# Patient Record
Sex: FEMALE | Race: WHITE | NOT HISPANIC OR LATINO | Employment: FULL TIME | URBAN - METROPOLITAN AREA
[De-identification: names, ages, dates, MRNs, and addresses within clinical notes are randomized per-mention and may not be internally consistent; named-entity substitution may affect disease eponyms.]

---

## 2017-03-30 DIAGNOSIS — Z00.00 ANNUAL PHYSICAL EXAM: Primary | ICD-10-CM

## 2017-05-11 ENCOUNTER — HOSPITAL ENCOUNTER (OUTPATIENT)
Dept: RADIOLOGY | Facility: HOSPITAL | Age: 52
Discharge: HOME | End: 2017-05-11
Attending: INTERNAL MEDICINE
Payer: COMMERCIAL

## 2017-05-11 ENCOUNTER — CLINICAL SUPPORT (OUTPATIENT)
Dept: INTERNAL MEDICINE | Facility: CLINIC | Age: 52
End: 2017-05-11
Payer: COMMERCIAL

## 2017-05-11 ENCOUNTER — OFFICE VISIT (OUTPATIENT)
Dept: FAMILY MEDICINE | Facility: CLINIC | Age: 52
End: 2017-05-11
Payer: COMMERCIAL

## 2017-05-11 VITALS
TEMPERATURE: 98 F | WEIGHT: 178 LBS | HEIGHT: 66 IN | HEART RATE: 70 BPM | DIASTOLIC BLOOD PRESSURE: 80 MMHG | SYSTOLIC BLOOD PRESSURE: 128 MMHG | RESPIRATION RATE: 18 BRPM | BODY MASS INDEX: 28.61 KG/M2 | OXYGEN SATURATION: 98 %

## 2017-05-11 DIAGNOSIS — I10 ESSENTIAL HYPERTENSION: ICD-10-CM

## 2017-05-11 DIAGNOSIS — Z12.31 VISIT FOR SCREENING MAMMOGRAM: ICD-10-CM

## 2017-05-11 DIAGNOSIS — Z00.00 ROUTINE PHYSICAL EXAMINATION: Primary | ICD-10-CM

## 2017-05-11 DIAGNOSIS — Z00.00 ANNUAL PHYSICAL EXAM: ICD-10-CM

## 2017-05-11 DIAGNOSIS — Z00.00 ANNUAL PHYSICAL EXAM: Primary | ICD-10-CM

## 2017-05-11 LAB
ALBUMIN SERPL BCP-MCNC: 4 G/DL
ALP SERPL-CCNC: 50 U/L
ALT SERPL W/O P-5'-P-CCNC: 12 U/L
ANION GAP SERPL CALC-SCNC: 12 MMOL/L
AST SERPL-CCNC: 15 U/L
BILIRUB SERPL-MCNC: 0.9 MG/DL
BUN SERPL-MCNC: 11 MG/DL
CALCIUM SERPL-MCNC: 9 MG/DL
CHLORIDE SERPL-SCNC: 100 MMOL/L
CHOLEST/HDLC SERPL: 3.1 {RATIO}
CO2 SERPL-SCNC: 28 MMOL/L
CREAT SERPL-MCNC: 0.8 MG/DL
ERYTHROCYTE [DISTWIDTH] IN BLOOD BY AUTOMATED COUNT: 14.9 %
EST. GFR  (AFRICAN AMERICAN): >60 ML/MIN/1.73 M^2
EST. GFR  (NON AFRICAN AMERICAN): >60 ML/MIN/1.73 M^2
GLUCOSE SERPL-MCNC: 94 MG/DL
HCT VFR BLD AUTO: 43.7 %
HDL/CHOLESTEROL RATIO: 32 %
HDLC SERPL-MCNC: 203 MG/DL
HDLC SERPL-MCNC: 65 MG/DL
HGB BLD-MCNC: 15 G/DL
LDLC SERPL CALC-MCNC: 115 MG/DL
MCH RBC QN AUTO: 27.8 PG
MCHC RBC AUTO-ENTMCNC: 34.3 %
MCV RBC AUTO: 81 FL
NONHDLC SERPL-MCNC: 138 MG/DL
PLATELET # BLD AUTO: 253 K/UL
PMV BLD AUTO: 10.5 FL
POTASSIUM SERPL-SCNC: 3.9 MMOL/L
PROT SERPL-MCNC: 7.3 G/DL
RBC # BLD AUTO: 5.4 M/UL
SODIUM SERPL-SCNC: 140 MMOL/L
TRIGL SERPL-MCNC: 115 MG/DL
TSH SERPL DL<=0.005 MIU/L-ACNC: 1.6 UIU/ML
WBC # BLD AUTO: 5.03 K/UL

## 2017-05-11 PROCEDURE — 80053 COMPREHEN METABOLIC PANEL: CPT | Mod: PO

## 2017-05-11 PROCEDURE — 85027 COMPLETE CBC AUTOMATED: CPT | Mod: PO

## 2017-05-11 PROCEDURE — 84443 ASSAY THYROID STIM HORMONE: CPT

## 2017-05-11 PROCEDURE — 77067 SCR MAMMO BI INCL CAD: CPT | Mod: TC

## 2017-05-11 PROCEDURE — 83036 HEMOGLOBIN GLYCOSYLATED A1C: CPT

## 2017-05-11 PROCEDURE — 77063 BREAST TOMOSYNTHESIS BI: CPT | Mod: 26,,, | Performed by: RADIOLOGY

## 2017-05-11 PROCEDURE — 99999 PR PBB SHADOW E&M-EST. PATIENT-LVL III: CPT | Mod: PBBFAC,,, | Performed by: INTERNAL MEDICINE

## 2017-05-11 PROCEDURE — 77067 SCR MAMMO BI INCL CAD: CPT | Mod: 26,,, | Performed by: RADIOLOGY

## 2017-05-11 PROCEDURE — 99396 PREV VISIT EST AGE 40-64: CPT | Mod: S$GLB,,, | Performed by: INTERNAL MEDICINE

## 2017-05-11 PROCEDURE — 80061 LIPID PANEL: CPT

## 2017-05-11 PROCEDURE — 3074F SYST BP LT 130 MM HG: CPT | Mod: S$GLB,,, | Performed by: INTERNAL MEDICINE

## 2017-05-11 PROCEDURE — 3079F DIAST BP 80-89 MM HG: CPT | Mod: S$GLB,,, | Performed by: INTERNAL MEDICINE

## 2017-05-11 NOTE — PROGRESS NOTES
May 11, 2017                                                                                                                                                                                                                                                                                      Eugenie Najera  [address] [facility] [address]                                                                                                                                                                                                                                                                                                RE: Eugenie Najera                                                        Clinic #:[id number]                                                                                                                                   Dear  Eugenie Najera,                                                                                                                                           Thank you for allowing me to serve you and perform your Executive Health exam on May 11, 2017.   This letter will serve a brief summary of the history, physical findings, and laboratory/studies performed and recommendations at that time.                                                                                         REASON FOR VISIT: Executive Health Preventive Physical Examination    Past Medical History:   Diagnosis Date    Anemia     Hypertension     Thyroid disorder        Past Surgical History:   Procedure Laterality Date     SECTION      X2    DILATION AND CURETTAGE OF UTERUS      lapband      TUBAL LIGATION         Family History   Problem Relation Age of Onset    Cancer Father      thyroid cancer       Social History     Social History    Marital status:      Spouse name: N/A    Number of children: N/A    Years of education: N/A     Occupational History    Not on  "file.     Social History Main Topics    Smoking status: Never Smoker    Smokeless tobacco: Never Used    Alcohol use 3.0 oz/week     5 Glasses of wine per week      Comment: wine, beer    Drug use: No    Sexual activity: Not on file     Other Topics Concern    Not on file     Social History Narrative       Allergies: Penicillin v potassium    Current Outpatient Prescriptions   Medication Sig Dispense Refill    acetaminophen (TYLENOL EXTRA STRENGTH) 500 MG tablet Take by mouth. 3 every 3-4 hours      hydrochlorothiazide (HYDRODIURIL) 25 MG tablet Take 1 tablet (25 mg total) by mouth once daily. 90 tablet 1     No current facility-administered medications for this visit.        REVIEW OF SYSTEMS:  No recent changes in weight, or complaints of fatigue. No recent changes in vision, or hearing. Denies frequent headaches.No recent changes in voice. No new or changing skin lesions. Denies abnormal bruising or bleeding.  Denies chest pain or sensation of skipped beats. No new onset of shortness of breath, or dyspnea on exertion. Denies abdominal discomfort, constipation, diarrhea,or blood in stool. Denies difficulty with urination.   No recent joint swelling or muscle discomfort. Denies pain or weakness in extremities. No recent loss of balance. Denies problems with sleep or depression.        Remainder of the review of systems without pertinent positives at this time.                                                                              PHYSICAL EXAM:   VITAL SIGNS: /80 (BP Location: Right arm, Patient Position: Sitting, BP Method: Manual)  Pulse 70  Temp 97.9 °F (36.6 °C) (Oral)   Resp 18  Ht 5' 6" (1.676 m)  Wt 80.7 kg (178 lb)  LMP 08/09/2015  SpO2 98%  BMI 28.73 kg/m2  GENERAL APPEARANCE:  Well nourished and normally developed,  pleasant 51 y.o. female, in good spirits.  SKIN: Without rashes or overt lesions.  HEENT: Head normacephalic. There was no scleral icterus. Mucous membranes were " moist. Dentition. Neck is supple, no thyromegaly, or carotid bruits.  LUNGS: Clear to auscultation bilaterally. Normal respiratory effort.  HEART: Exam reveals regular rate and rhythm. First and second heart sounds normal. No murmurs, rubs or gallops.   ABDOMEN: Soft, non-tender, non-distended. Exam reveals normal bowl sounds, no masses, no organomegaly and no aortic enlargement.    EXTREMITIES:  Non edematous, both femoral and pedal pulses are normal. No joint stiffness or tenderness. Full range of motion and strength, upper and lower bilaterally.    LAB DATA/STUDIES REVIEWED:  LABS:  Acceptable  Mammogram:  Normal    ASSESSMENT/RECOMMENDATIONS :    At this time,  you appear to be in good medical condition.    Your blood pressure is controlled.  You are not anemic and your liver enzymes are good.   Continue to work on regular exercise, maintenance of a healthy weight, balanced diet rich in fruits/vegetables and lean protein, and avoidance of unhealthy habits like smoking and excessive alcohol intake.  I look forward to seeing you again next year.    Please contact me should you have any questions or concerns regarding physical findings, or my recommendations.       Sincerely yours,          Ramon Mcleod M.D.  Department of Internal Medicine  [facility]

## 2017-05-12 LAB
ESTIMATED AVG GLUCOSE: 105 MG/DL
HBA1C MFR BLD HPLC: 5.3 %

## 2017-08-25 ENCOUNTER — OFFICE VISIT (OUTPATIENT)
Dept: URGENT CARE | Facility: CLINIC | Age: 52
End: 2017-08-25
Payer: COMMERCIAL

## 2017-08-25 VITALS
SYSTOLIC BLOOD PRESSURE: 154 MMHG | HEIGHT: 66 IN | OXYGEN SATURATION: 96 % | RESPIRATION RATE: 18 BRPM | BODY MASS INDEX: 27.32 KG/M2 | DIASTOLIC BLOOD PRESSURE: 92 MMHG | TEMPERATURE: 97 F | WEIGHT: 170 LBS | HEART RATE: 100 BPM

## 2017-08-25 DIAGNOSIS — S81.812A LACERATION OF LEFT LEG, INITIAL ENCOUNTER: Primary | ICD-10-CM

## 2017-08-25 PROCEDURE — 3008F BODY MASS INDEX DOCD: CPT | Mod: S$GLB,,, | Performed by: FAMILY MEDICINE

## 2017-08-25 PROCEDURE — 99214 OFFICE O/P EST MOD 30 MIN: CPT | Mod: S$GLB,,, | Performed by: FAMILY MEDICINE

## 2017-08-25 PROCEDURE — 3077F SYST BP >= 140 MM HG: CPT | Mod: S$GLB,,, | Performed by: FAMILY MEDICINE

## 2017-08-25 PROCEDURE — 3080F DIAST BP >= 90 MM HG: CPT | Mod: S$GLB,,, | Performed by: FAMILY MEDICINE

## 2017-08-25 RX ORDER — PHENTERMINE HYDROCHLORIDE 37.5 MG/1
TABLET ORAL
Refills: 0 | COMMUNITY
Start: 2017-06-26 | End: 2018-05-22 | Stop reason: ALTCHOICE
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
## 2017-08-28 ENCOUNTER — TELEPHONE (OUTPATIENT)
Dept: URGENT CARE | Facility: CLINIC | Age: 52
End: 2017-08-28

## 2018-05-22 ENCOUNTER — OFFICE VISIT (OUTPATIENT)
Dept: FAMILY MEDICINE | Facility: CLINIC | Age: 53
End: 2018-05-22
Payer: COMMERCIAL

## 2018-05-22 ENCOUNTER — CLINICAL SUPPORT (OUTPATIENT)
Dept: INTERNAL MEDICINE | Facility: CLINIC | Age: 53
End: 2018-05-22
Payer: COMMERCIAL

## 2018-05-22 VITALS
SYSTOLIC BLOOD PRESSURE: 132 MMHG | WEIGHT: 190 LBS | HEIGHT: 66 IN | BODY MASS INDEX: 30.53 KG/M2 | RESPIRATION RATE: 18 BRPM | DIASTOLIC BLOOD PRESSURE: 78 MMHG | TEMPERATURE: 99 F | HEART RATE: 76 BPM

## 2018-05-22 DIAGNOSIS — J06.9 UPPER RESPIRATORY TRACT INFECTION, UNSPECIFIED TYPE: ICD-10-CM

## 2018-05-22 DIAGNOSIS — Z00.00 ANNUAL PHYSICAL EXAM: Primary | ICD-10-CM

## 2018-05-22 DIAGNOSIS — Z00.00 ROUTINE PHYSICAL EXAMINATION: Primary | ICD-10-CM

## 2018-05-22 DIAGNOSIS — E66.9 OBESITY (BMI 30-39.9): ICD-10-CM

## 2018-05-22 DIAGNOSIS — Z12.11 SCREEN FOR COLON CANCER: ICD-10-CM

## 2018-05-22 DIAGNOSIS — D72.829 LEUKOCYTOSIS, UNSPECIFIED TYPE: ICD-10-CM

## 2018-05-22 LAB
ALBUMIN SERPL BCP-MCNC: 4.1 G/DL
ALP SERPL-CCNC: 53 U/L
ALT SERPL W/O P-5'-P-CCNC: 15 U/L
ANION GAP SERPL CALC-SCNC: 10 MMOL/L
AST SERPL-CCNC: 15 U/L
BILIRUB SERPL-MCNC: 1 MG/DL
BUN SERPL-MCNC: 9 MG/DL
CALCIUM SERPL-MCNC: 9.6 MG/DL
CHLORIDE SERPL-SCNC: 100 MMOL/L
CHOLEST SERPL-MCNC: 217 MG/DL
CHOLEST/HDLC SERPL: 3.8 {RATIO}
CO2 SERPL-SCNC: 30 MMOL/L
CREAT SERPL-MCNC: 0.7 MG/DL
ERYTHROCYTE [DISTWIDTH] IN BLOOD BY AUTOMATED COUNT: 13.7 %
EST. GFR  (AFRICAN AMERICAN): >60 ML/MIN/1.73 M^2
EST. GFR  (NON AFRICAN AMERICAN): >60 ML/MIN/1.73 M^2
ESTIMATED AVG GLUCOSE: 97 MG/DL
GLUCOSE SERPL-MCNC: 96 MG/DL
HBA1C MFR BLD HPLC: 5 %
HCT VFR BLD AUTO: 44.4 %
HDLC SERPL-MCNC: 57 MG/DL
HDLC SERPL: 26.3 %
HGB BLD-MCNC: 15.4 G/DL
LDLC SERPL CALC-MCNC: 137 MG/DL
MCH RBC QN AUTO: 30.4 PG
MCHC RBC AUTO-ENTMCNC: 34.7 G/DL
MCV RBC AUTO: 88 FL
NONHDLC SERPL-MCNC: 160 MG/DL
PLATELET # BLD AUTO: 273 K/UL
PMV BLD AUTO: 9.7 FL
POTASSIUM SERPL-SCNC: 4.5 MMOL/L
PROT SERPL-MCNC: 7.3 G/DL
RBC # BLD AUTO: 5.06 M/UL
SODIUM SERPL-SCNC: 140 MMOL/L
TRIGL SERPL-MCNC: 115 MG/DL
TSH SERPL DL<=0.005 MIU/L-ACNC: 1.39 UIU/ML
WBC # BLD AUTO: 15.47 K/UL

## 2018-05-22 PROCEDURE — 85027 COMPLETE CBC AUTOMATED: CPT | Mod: PO

## 2018-05-22 PROCEDURE — 83036 HEMOGLOBIN GLYCOSYLATED A1C: CPT

## 2018-05-22 PROCEDURE — 99396 PREV VISIT EST AGE 40-64: CPT | Mod: S$GLB,,, | Performed by: INTERNAL MEDICINE

## 2018-05-22 PROCEDURE — 80061 LIPID PANEL: CPT

## 2018-05-22 PROCEDURE — 99999 PR PBB SHADOW E&M-EST. PATIENT-LVL III: CPT | Mod: PBBFAC,,, | Performed by: INTERNAL MEDICINE

## 2018-05-22 PROCEDURE — 3078F DIAST BP <80 MM HG: CPT | Mod: CPTII,S$GLB,, | Performed by: INTERNAL MEDICINE

## 2018-05-22 PROCEDURE — 80053 COMPREHEN METABOLIC PANEL: CPT | Mod: PO

## 2018-05-22 PROCEDURE — 36415 COLL VENOUS BLD VENIPUNCTURE: CPT | Mod: PO

## 2018-05-22 PROCEDURE — 84443 ASSAY THYROID STIM HORMONE: CPT

## 2018-05-22 PROCEDURE — 3075F SYST BP GE 130 - 139MM HG: CPT | Mod: CPTII,S$GLB,, | Performed by: INTERNAL MEDICINE

## 2018-05-22 RX ORDER — LEVOFLOXACIN 750 MG/1
750 TABLET ORAL DAILY
Qty: 7 TABLET | Refills: 0 | Status: SHIPPED | OUTPATIENT
Start: 2018-05-22 | End: 2018-05-29

## 2018-05-22 RX ORDER — PHENTERMINE HYDROCHLORIDE 37.5 MG/1
TABLET ORAL
Qty: 30 TABLET | Refills: 0 | Status: SHIPPED | OUTPATIENT
Start: 2018-05-22 | End: 2019-03-19

## 2018-05-22 RX ORDER — BENZONATATE 100 MG/1
CAPSULE ORAL
Qty: 45 CAPSULE | Refills: 0 | Status: SHIPPED | OUTPATIENT
Start: 2018-05-22 | End: 2019-03-19

## 2018-05-22 NOTE — PROGRESS NOTES
May 22, 2018                                                                                                                                                                                                                                                                                      Eugenie Najera  [address] [place] [id number]                                                                                                                                                                                                                                                                                                RE: Eugenie Najera                                                        Clinic #:[id number]                                                                                                                                   Dear  Eugenie Najera,                                                                                                                                           Thank you for allowing me to serve you and perform your Executive Health exam on May 22, 2018.   This letter will serve a brief summary of the history, physical findings, and laboratory/studies performed and recommendations at that time.                                                                                         REASON FOR VISIT: Executive Health Preventive Physical Examination    Past Medical History:   Diagnosis Date    Anemia     Hypertension     Thyroid disorder        Past Surgical History:   Procedure Laterality Date     SECTION      X2    DILATION AND CURETTAGE OF UTERUS      lapband      TUBAL LIGATION         Family History   Problem Relation Age of Onset    Cancer Father         thyroid cancer       Social History     Social History    Marital status:      Spouse name: N/A    Number of children: N/A    Years of education: N/A     Occupational History    Not on file.  "    Social History Main Topics    Smoking status: Never Smoker    Smokeless tobacco: Never Used    Alcohol use 3.0 oz/week     5 Glasses of wine per week      Comment: wine, beer    Drug use: No    Sexual activity: Not on file     Other Topics Concern    Not on file     Social History Narrative    No narrative on file       Allergies: Penicillin v potassium    Current Outpatient Prescriptions   Medication Sig Dispense Refill    acetaminophen (TYLENOL EXTRA STRENGTH) 500 MG tablet Take 1,000 mg by mouth daily as needed. As directed on package      hydroCHLOROthiazide (HYDRODIURIL) 25 MG tablet Take 1 tablet (25 mg total) by mouth once daily. 90 tablet 1    benzonatate (TESSALON) 100 MG capsule 1 - 2 po every 6 hours prn cough 45 capsule 0    levoFLOXacin (LEVAQUIN) 750 MG tablet Take 1 tablet (750 mg total) by mouth once daily. 7 tablet 0    phentermine (ADIPEX-P) 37.5 mg tablet TK 1 T PO QAM 30 tablet 0     No current facility-administered medications for this visit.        REVIEW OF SYSTEMS:  No recent changes in weight, or complaints of fatigue. No recent changes in vision, or hearing. Denies frequent headaches.No recent changes in voice. No new or changing skin lesions. Denies abnormal bruising or bleeding.  Denies chest pain or sensation of skipped beats. No new onset of shortness of breath, or dyspnea on exertion. Denies abdominal discomfort, constipation, diarrhea,or blood in stool. Denies difficulty with urination.   No recent joint swelling or muscle discomfort. Denies pain or weakness in extremities. No recent loss of balance. Denies problems with sleep or depression.        Remainder of the review of systems without pertinent positives at this time.                                                                              PHYSICAL EXAM:   VITAL SIGNS: /78   Pulse 76   Temp 98.5 °F (36.9 °C) (Oral)   Resp 18   Ht 5' 6" (1.676 m)   Wt 86.2 kg (190 lb)   LMP 08/09/2015   BMI 30.67 " kg/m²   GENERAL APPEARANCE:  Well nourished and normally developed,  pleasant 52 y.o. female, in good spirits.  SKIN: Without rashes or overt lesions.  HEENT: Head normacephalic. There was no scleral icterus. Mucous membranes were moist. Dentition. Neck is supple, no thyromegaly, or carotid bruits.  LUNGS: Clear to auscultation bilaterally. Normal respiratory effort.  HEART: Exam reveals regular rate and rhythm. First and second heart sounds normal. No murmurs, rubs or gallops.   ABDOMEN: Soft, non-tender, non-distended. Exam reveals normal bowl sounds, no masses, no organomegaly and no aortic enlargement.    EXTREMITIES:  Non edematous, both femoral and pedal pulses are normal. No joint stiffness or tenderness. Full range of motion and strength, upper and lower bilaterally.    LAB DATA/STUDIES REVIEWED:  LABS:  High white blood cells.      ASSESSMENT/RECOMMENDATIONS :    At this time,  you appear to be in good medical condition.    We have prescribed Adipex for weight loss today.  Your elevated white blood cell count is likely related to your bronchitis.  We will treat your cough and repeat your blood count.  Continue to work on regular exercise, maintenance of a healthy weight, balanced diet rich in fruits/vegetables and lean protein, and avoidance of unhealthy habits like smoking and excessive alcohol intake.  I look forward to seeing you again next year.    Please contact me should you have any questions or concerns regarding physical findings, or my recommendations.       Sincerely yours,          Ramon Mcleod M.D.  Department of Internal Medicine  [facility]

## 2018-06-14 ENCOUNTER — PATIENT MESSAGE (OUTPATIENT)
Dept: GASTROENTEROLOGY | Facility: CLINIC | Age: 53
End: 2018-06-14

## 2018-06-18 ENCOUNTER — TELEPHONE (OUTPATIENT)
Dept: FAMILY MEDICINE | Facility: CLINIC | Age: 53
End: 2018-06-18

## 2018-06-18 NOTE — TELEPHONE ENCOUNTER
Placed call to pt to confirm appt. Left message to call clinic to reschedule or cancel if unable to come. Call back number provided.

## 2018-09-21 ENCOUNTER — OFFICE VISIT (OUTPATIENT)
Dept: URGENT CARE | Facility: CLINIC | Age: 53
End: 2018-09-21
Payer: COMMERCIAL

## 2018-09-21 VITALS
OXYGEN SATURATION: 97 % | WEIGHT: 190 LBS | BODY MASS INDEX: 30.53 KG/M2 | DIASTOLIC BLOOD PRESSURE: 101 MMHG | RESPIRATION RATE: 16 BRPM | HEIGHT: 66 IN | TEMPERATURE: 98 F | HEART RATE: 87 BPM | SYSTOLIC BLOOD PRESSURE: 152 MMHG

## 2018-09-21 DIAGNOSIS — H60.502 ACUTE OTITIS EXTERNA OF LEFT EAR, UNSPECIFIED TYPE: ICD-10-CM

## 2018-09-21 DIAGNOSIS — K29.60 REFLUX GASTRITIS: Primary | ICD-10-CM

## 2018-09-21 PROCEDURE — 3077F SYST BP >= 140 MM HG: CPT | Mod: CPTII,S$GLB,, | Performed by: FAMILY MEDICINE

## 2018-09-21 PROCEDURE — 3080F DIAST BP >= 90 MM HG: CPT | Mod: CPTII,S$GLB,, | Performed by: FAMILY MEDICINE

## 2018-09-21 PROCEDURE — 3008F BODY MASS INDEX DOCD: CPT | Mod: CPTII,S$GLB,, | Performed by: FAMILY MEDICINE

## 2018-09-21 PROCEDURE — 99214 OFFICE O/P EST MOD 30 MIN: CPT | Mod: S$GLB,,, | Performed by: FAMILY MEDICINE

## 2018-09-21 RX ORDER — FLUTICASONE PROPIONATE 50 MCG
1 SPRAY, SUSPENSION (ML) NASAL 2 TIMES DAILY
Qty: 1 BOTTLE | Refills: 2 | Status: SHIPPED | OUTPATIENT
Start: 2018-09-21 | End: 2020-08-13

## 2018-09-21 RX ORDER — OFLOXACIN 3 MG/ML
10 SOLUTION AURICULAR (OTIC) 2 TIMES DAILY
Qty: 10 ML | Refills: 0 | Status: SHIPPED | OUTPATIENT
Start: 2018-09-21 | End: 2018-10-05

## 2018-09-21 RX ORDER — SUCRALFATE 1 G/1
1 TABLET ORAL 4 TIMES DAILY
Qty: 120 TABLET | Refills: 1 | Status: SHIPPED | OUTPATIENT
Start: 2018-09-21 | End: 2018-12-11 | Stop reason: ALTCHOICE

## 2018-09-21 RX ORDER — LIDOCAINE HYDROCHLORIDE 20 MG/ML
SOLUTION OROPHARYNGEAL
Qty: 100 ML | Refills: 0 | Status: SHIPPED | OUTPATIENT
Start: 2018-09-21 | End: 2019-03-19

## 2018-09-21 NOTE — PROGRESS NOTES
"Subjective:       Patient ID: Eugenie Najera is a 52 y.o. female.    Vitals:  height is 5' 6" (1.676 m) and weight is 86.2 kg (190 lb). Her temperature is 97.9 °F (36.6 °C). Her blood pressure is 152/101 (abnormal) and her pulse is 87. Her respiration is 16 and oxygen saturation is 97%.     Chief Complaint: Gastroesophageal Reflux    Pt has been taking otc nexium for 3 weeks and no relief. Pt c/o left ear pain.       Gastroesophageal Reflux   She complains of heartburn and a hoarse voice. She reports no abdominal pain, no chest pain, no nausea or no sore throat. This is a new problem. The current episode started 1 to 4 weeks ago. The problem has been unchanged. The symptoms are aggravated by lying down.     Review of Systems   Constitution: Negative for chills and fever.   HENT: Positive for hoarse voice. Negative for sore throat.    Eyes: Negative for blurred vision.   Cardiovascular: Negative for chest pain.   Respiratory: Negative for shortness of breath.    Skin: Negative for rash.   Musculoskeletal: Negative for back pain and joint pain.   Gastrointestinal: Positive for heartburn. Negative for abdominal pain, diarrhea, nausea and vomiting.   Neurological: Negative for headaches.   Psychiatric/Behavioral: The patient is not nervous/anxious.        Objective:      Physical Exam   Constitutional: She is oriented to person, place, and time. She appears well-developed and well-nourished.   HENT:   Head: Normocephalic and atraumatic.   Right Ear: Hearing, tympanic membrane, external ear and ear canal normal.   Left Ear: There is swelling (mild and some erythema at inf aspect of canal). A middle ear effusion is present.   Nose: Nose normal.   Mouth/Throat: Mucous membranes are normal.   Eyes: Conjunctivae and lids are normal.   Neck: Trachea normal and full passive range of motion without pain. Neck supple.   Cardiovascular: Normal rate, regular rhythm and normal heart sounds.   Pulmonary/Chest: Effort normal and " breath sounds normal. No respiratory distress.   Abdominal: Soft. Normal appearance and bowel sounds are normal. She exhibits no distension, no abdominal bruit, no pulsatile midline mass and no mass. There is tenderness (epigastric).   Musculoskeletal: Normal range of motion. She exhibits no edema.   Neurological: She is alert and oriented to person, place, and time. She has normal strength.   Skin: Skin is warm, dry and intact. She is not diaphoretic. No pallor.   Psychiatric: She has a normal mood and affect. Her speech is normal and behavior is normal. Judgment and thought content normal. Cognition and memory are normal.   Nursing note and vitals reviewed.      Assessment:       1. Reflux gastritis    2. Acute otitis externa of left ear, unspecified type        Plan:         Reflux gastritis  -     sucralfate (CARAFATE) 1 gram tablet; Take 1 tablet (1 g total) by mouth 4 (four) times daily.  Dispense: 120 tablet; Refill: 1  -     lidocaine HCl 2% (LIDOCAINE VISCOUS) 2 % Soln; 5mL by mouth 3 times daily  Dispense: 100 mL; Refill: 0    Acute otitis externa of left ear, unspecified type  -     ofloxacin (FLOXIN) 0.3 % otic solution; Place 10 drops into the left ear 2 (two) times daily. for 14 days  Dispense: 10 mL; Refill: 0    Other orders  -     fluticasone (FLONASE) 50 mcg/actuation nasal spray; 1 spray (50 mcg total) by Each Nare route 2 (two) times daily.  Dispense: 1 Bottle; Refill: 2        advised diet control. Instructions for pre-flight precautions

## 2018-11-29 ENCOUNTER — OFFICE VISIT (OUTPATIENT)
Dept: FAMILY MEDICINE | Facility: CLINIC | Age: 53
End: 2018-11-29
Payer: COMMERCIAL

## 2018-11-29 ENCOUNTER — LAB VISIT (OUTPATIENT)
Dept: LAB | Facility: HOSPITAL | Age: 53
End: 2018-11-29
Attending: NURSE PRACTITIONER
Payer: COMMERCIAL

## 2018-11-29 VITALS
OXYGEN SATURATION: 97 % | DIASTOLIC BLOOD PRESSURE: 72 MMHG | BODY MASS INDEX: 32.56 KG/M2 | HEART RATE: 92 BPM | HEIGHT: 66 IN | WEIGHT: 202.63 LBS | TEMPERATURE: 98 F | SYSTOLIC BLOOD PRESSURE: 130 MMHG

## 2018-11-29 DIAGNOSIS — D72.9 NEUTROPHILIA: ICD-10-CM

## 2018-11-29 DIAGNOSIS — I10 ESSENTIAL (PRIMARY) HYPERTENSION: ICD-10-CM

## 2018-11-29 DIAGNOSIS — Z12.11 ENCOUNTER FOR SCREENING FOR MALIGNANT NEOPLASM OF COLON: ICD-10-CM

## 2018-11-29 DIAGNOSIS — K21.00 GASTROESOPHAGEAL REFLUX DISEASE WITH ESOPHAGITIS: Primary | ICD-10-CM

## 2018-11-29 DIAGNOSIS — E66.09 CLASS 1 OBESITY DUE TO EXCESS CALORIES WITH SERIOUS COMORBIDITY AND BODY MASS INDEX (BMI) OF 32.0 TO 32.9 IN ADULT: ICD-10-CM

## 2018-11-29 LAB
BASOPHILS # BLD AUTO: 0.05 K/UL
BASOPHILS NFR BLD: 0.5 %
DIFFERENTIAL METHOD: ABNORMAL
EOSINOPHIL # BLD AUTO: 0.1 K/UL
EOSINOPHIL NFR BLD: 0.7 %
ERYTHROCYTE [DISTWIDTH] IN BLOOD BY AUTOMATED COUNT: 13.5 %
HCT VFR BLD AUTO: 47 %
HGB BLD-MCNC: 15.2 G/DL
IMM GRANULOCYTES # BLD AUTO: 0.03 K/UL
IMM GRANULOCYTES NFR BLD AUTO: 0.3 %
LYMPHOCYTES # BLD AUTO: 1.5 K/UL
LYMPHOCYTES NFR BLD: 13.5 %
MCH RBC QN AUTO: 31 PG
MCHC RBC AUTO-ENTMCNC: 32.3 G/DL
MCV RBC AUTO: 96 FL
MONOCYTES # BLD AUTO: 0.6 K/UL
MONOCYTES NFR BLD: 5.4 %
NEUTROPHILS # BLD AUTO: 8.6 K/UL
NEUTROPHILS NFR BLD: 79.6 %
NRBC BLD-RTO: 0 /100 WBC
PLATELET # BLD AUTO: 249 K/UL
PMV BLD AUTO: 10.9 FL
RBC # BLD AUTO: 4.91 M/UL
WBC # BLD AUTO: 10.8 K/UL

## 2018-11-29 PROCEDURE — 3075F SYST BP GE 130 - 139MM HG: CPT | Mod: CPTII,S$GLB,, | Performed by: NURSE PRACTITIONER

## 2018-11-29 PROCEDURE — 85025 COMPLETE CBC W/AUTO DIFF WBC: CPT

## 2018-11-29 PROCEDURE — 99999 PR PBB SHADOW E&M-EST. PATIENT-LVL V: CPT | Mod: PBBFAC,,, | Performed by: NURSE PRACTITIONER

## 2018-11-29 PROCEDURE — 36415 COLL VENOUS BLD VENIPUNCTURE: CPT | Mod: PO

## 2018-11-29 PROCEDURE — 99214 OFFICE O/P EST MOD 30 MIN: CPT | Mod: S$GLB,,, | Performed by: NURSE PRACTITIONER

## 2018-11-29 PROCEDURE — 3008F BODY MASS INDEX DOCD: CPT | Mod: CPTII,S$GLB,, | Performed by: NURSE PRACTITIONER

## 2018-11-29 PROCEDURE — 3078F DIAST BP <80 MM HG: CPT | Mod: CPTII,S$GLB,, | Performed by: NURSE PRACTITIONER

## 2018-11-29 RX ORDER — ESOMEPRAZOLE MAGNESIUM 40 MG/1
40 CAPSULE, DELAYED RELEASE ORAL
COMMUNITY
End: 2019-03-19

## 2018-11-29 NOTE — PATIENT INSTRUCTIONS
"ZANTAC (ranitidine) 300mg nightly   Continue the Nexium     GERD (Adult)    The esophagus is a tube that carries food from the mouth to the stomach. A valve at the lower end of the esophagus prevents stomach acid from flowing upward. When this valve doesn't work properly, stomach contents may repeatedly flow back up (reflux) into the esophagus. This is called gastroesophageal reflux disease (GERD). GERD can irritate the esophagus. It can cause problems with swallowing or breathing. In severe cases, GERD can cause recurrent pneumonia or other serious problems.  Symptoms of reflux include burning, pressure or sharp pain in the upper abdomen or mid to lower chest. The pain can spread to the neck, back, or shoulder. There may be belching, an acid taste in the back of the throat, chronic cough, or sore throat or hoarseness. GERD symptoms often occur during the day after a big meal. They can also occur at night when lying down.   Home care  Lifestyle changes can help reduce symptoms. If needed, medicines may be prescribed. Symptoms often improve with treatment, but if treatment is stopped, the symptoms often return after a few months. So most persons with GERD will need to continue treatment.  Lifestyle changes  · Limit or avoid fatty, fried, and spicy foods, as well as coffee, chocolate, mint, and foods with high acid content such as tomatoes and citrus fruit and juices (orange, grapefruit, lemon).  · Dont eat large meals, especially at night. Frequent, smaller meals are best. Do not lie down right after eating. And dont eat anything 3 hours before going to bed.  · Avoid drinking alcohol and smoking. As much as possible, stay away from second hand smoke.  · If you are overweight, losing weight will reduce symptoms.   · Avoid wearing tight clothing around your stomach area.  · If your symptoms occur during sleep, use a foam wedge to elevate your upper body (not just your head.) Or, place 4" blocks under the head of your " bed.  Medicines  If needed, medicines can help relieve the symptoms of GERD and prevent damage to the esophagus. Discuss a medicine plan with your healthcare provider. This may include one or more of the following medicines:  · Antacids to help neutralize the normal acids in your stomach.  · Acid blockers (H2 blockers) to decrease acid production.  · Acid inhibitors (PPIs) to decrease acid production in a different way than the blockers. They may work better, but can take a little longer to take effect.  Take an antacid 30-60 minutes after eating and at bedtime, but not at the same time as an acid blocker.  Try not to take medicines such as ibuprofen and aspirin. If you are taking aspirin for your heart or other medical reasons, talk to your healthcare provider about stopping it.  Follow-up care  Follow up with your healthcare provider or as advised by our staff.  When to seek medical advice  Call your healthcare provider if any of the following occur:  · Stomach pain gets worse or moves to the lower right abdomen (appendix area)  · Chest pain appears or gets worse, or spreads to the back, neck, shoulder, or arm  · Frequent vomiting (cant keep down liquids)  · Blood in the stool or vomit (red or black in color)  · Feeling weak or dizzy  · Fever of 100.4ºF (38ºC) or higher, or as directed by your healthcare provider  Date Last Reviewed: 6/23/2015  © 6385-8945 [employer]. [address] [place] [address], [place] [address]. All rights reserved. This information is not intended as a substitute for professional medical care. Always follow your healthcare professional's instructions.      Tips to Control Acid Reflux    To control acid reflux, youll need to make some basic diet and lifestyle changes. The simple steps outlined below may be all youll need to ease discomfort.  Watch what you eat  · Avoid fatty foods and spicy foods.  · Eat fewer acidic foods, such as citrus and tomato-based foods. These can increase  symptoms.  · Limit drinking alcohol, caffeine, and fizzy beverages. All increase acid reflux.  · Try limiting chocolate, peppermint, and spearmint. These can worsen acid reflux in some people.  Watch when you eat  · Avoid lying down for 3 hours after eating.  · Do not snack before going to bed.  Raise your head  Raising your head and upper body by 4 to 6 inches helps limit reflux when youre lying down. Put blocks under the head of your bed frame to raise it.  Other changes  · Lose weight, if you need to  · Dont exercise near bedtime  · Avoid tight-fitting clothes  · Limit aspirin and ibuprofen  · Stop smoking   Date Last Reviewed: 7/1/2016  © 7202-4231 [employer]. [address] [place] [address], [place] [address]. All rights reserved. This information is not intended as a substitute for professional medical care. Always follow your healthcare professional's instructions.

## 2018-11-29 NOTE — PROGRESS NOTES
Subjective:       Patient ID: Eugenie Najera is a 52 y.o. female.    Chief Complaint: Gastroesophageal Reflux (since September)    Ms. Najera is a new patient to me. She presents today for GERD    Gastroesophageal Reflux   She reports no abdominal pain, no chest pain or no coughing. This is a new problem. Episode onset: 2 months ago. Episode frequency: daily. The problem has been unchanged. Nothing aggravates the symptoms. Risk factors include obesity (hx lap band). She has tried a PPI, an antacid and a histamine-2 antagonist for the symptoms. The treatment provided no relief.     Denies dietary or lifestyle change to attribute to new onset reflux   Vitals:    11/29/18 0835   BP: 130/72   Pulse: 92   Temp: 98.4 °F (36.9 °C)     Review of Systems   Constitutional: Negative for diaphoresis and fever.   HENT: Negative for facial swelling and trouble swallowing.    Eyes: Negative for discharge and redness.   Respiratory: Negative for cough and shortness of breath.    Cardiovascular: Negative for chest pain and palpitations.   Gastrointestinal: Positive for vomiting. Negative for abdominal pain, blood in stool, constipation and diarrhea.        Gerd   Genitourinary: Negative for difficulty urinating.   Musculoskeletal: Negative for gait problem.   Skin: Negative for rash and wound.   Neurological: Negative for facial asymmetry and speech difficulty.   Psychiatric/Behavioral: Negative for confusion. The patient is not nervous/anxious.        Past Medical History:   Diagnosis Date    Anemia     Hypertension     Thyroid disorder      Objective:      Physical Exam   Constitutional: She is oriented to person, place, and time. She does not have a sickly appearance. No distress.   HENT:   Head: Normocephalic.   Right Ear: Hearing normal.   Left Ear: Hearing normal.   Nose: Nose normal.   Eyes: Conjunctivae and lids are normal.   Neck: No JVD present. No tracheal deviation present.   Cardiovascular: Normal rate and normal  heart sounds.   Pulmonary/Chest: Effort normal and breath sounds normal.   Abdominal: Normal appearance. She exhibits no distension.   Musculoskeletal: She exhibits no deformity.   Neurological: She is alert and oriented to person, place, and time.   Skin: She is not diaphoretic. No pallor.   Psychiatric: She has a normal mood and affect. Her speech is normal and behavior is normal. Judgment and thought content normal. Cognition and memory are normal.   Nursing note and vitals reviewed.      Assessment:       1. Gastroesophageal reflux disease with esophagitis    2. Class 1 obesity due to excess calories with serious comorbidity and body mass index (BMI) of 32.0 to 32.9 in adult    3. Essential (primary) hypertension    4. Neutrophilia    5. Encounter for screening for malignant neoplasm of colon        Plan:       Gastroesophageal reflux disease with esophagitis  -     Case request GI: EGD (ESOPHAGOGASTRODUODENOSCOPY)    Class 1 obesity due to excess calories with serious comorbidity and body mass index (BMI) of 32.0 to 32.9 in adult    Essential (primary) hypertension    Neutrophilia  -     CBC auto differential; Future; Expected date: 11/29/2018    Encounter for screening for malignant neoplasm of colon  -     Case request GI: COLONOSCOPY    Continue nexium and zantac  Advised to notify us if she hasnt heard from GI in 1-2 weeks  Handout and education provided on GERD, tips to control        Follow-up for further evaluation if s/s worsen, fail to improve, or new symptoms arise.       Medication List           Accurate as of 11/29/18  9:37 AM. If you have any questions, ask your nurse or doctor.               CONTINUE taking these medications    benzonatate 100 MG capsule  Commonly known as:  TESSALON  1 - 2 po every 6 hours prn cough     esomeprazole 40 MG capsule  Commonly known as:  NEXIUM     fluticasone 50 mcg/actuation nasal spray  Commonly known as:  FLONASE  1 spray (50 mcg total) by Each Nare route 2  (two) times daily.     hydroCHLOROthiazide 25 MG tablet  Commonly known as:  HYDRODIURIL  Take 1 tablet (25 mg total) by mouth once daily.     lidocaine HCl 2% 2 % Soln  Commonly known as:  LIDOCAINE VISCOUS  5mL by mouth 3 times daily     phentermine 37.5 mg tablet  Commonly known as:  ADIPEX-P  TK 1 T PO QAM     sucralfate 1 gram tablet  Commonly known as:  CARAFATE  Take 1 tablet (1 g total) by mouth 4 (four) times daily.     TYLENOL EXTRA STRENGTH 500 MG tablet  Generic drug:  acetaminophen

## 2018-11-30 ENCOUNTER — PATIENT MESSAGE (OUTPATIENT)
Dept: FAMILY MEDICINE | Facility: CLINIC | Age: 53
End: 2018-11-30

## 2018-11-30 ENCOUNTER — TELEPHONE (OUTPATIENT)
Dept: FAMILY MEDICINE | Facility: CLINIC | Age: 53
End: 2018-11-30

## 2018-11-30 NOTE — TELEPHONE ENCOUNTER
----- Message from Jacinda Lynn sent at 11/29/2018  3:33 PM CST -----  Contact: Eugenie  Type:  Test Results    Who Called:  patient  Name of Test (Lab/Mammo/Etc):  lab  Date of Test:  11/29/18  Ordering Provider:  Shanna Krueger  Where the test was performed:  [address]  Best Call Back Number:  [phone] (please call cell as about to board the plane)  Additional Information:  Na

## 2018-11-30 NOTE — TELEPHONE ENCOUNTER
DVDPlay message has been sent to pt by Shanna.  Please note time 330pm, 11/29/18 pt asked for call prior due to travel.

## 2018-12-11 ENCOUNTER — PATIENT MESSAGE (OUTPATIENT)
Dept: FAMILY MEDICINE | Facility: CLINIC | Age: 53
End: 2018-12-11

## 2018-12-11 ENCOUNTER — TELEPHONE (OUTPATIENT)
Dept: FAMILY MEDICINE | Facility: CLINIC | Age: 53
End: 2018-12-11

## 2018-12-11 ENCOUNTER — TELEPHONE (OUTPATIENT)
Dept: GASTROENTEROLOGY | Facility: CLINIC | Age: 53
End: 2018-12-11

## 2018-12-11 RX ORDER — SUCRALFATE 1 G/10ML
1 SUSPENSION ORAL 4 TIMES DAILY
Qty: 414 ML | Refills: 0 | Status: ON HOLD | OUTPATIENT
Start: 2018-12-11 | End: 2018-12-18 | Stop reason: HOSPADM

## 2018-12-11 NOTE — TELEPHONE ENCOUNTER
----- Message from Mildred Barnett sent at 12/11/2018  3:55 PM CST -----  Please call pt at [phone] ....return call for Wanda

## 2018-12-11 NOTE — TELEPHONE ENCOUNTER
Please, call and inform patient:   Take 2x 40 mg Nexium daily = total 80 mg daily.  Take a total of 300 mg of Zantac daily.  This increase may help until her EGD.  Have her sleep elevated - lots of pillows or put something under the head of her bed to raise it.

## 2018-12-11 NOTE — TELEPHONE ENCOUNTER
Spoke with patient, last night was having a lot reflux/spitting up. Happens intermittently. Taking zantac and nexium daily

## 2018-12-11 NOTE — TELEPHONE ENCOUNTER
----- Message from Opal Li sent at 12/11/2018  3:48 PM CST -----  Contact: patient  Type: Needs Medical Advice    Who Called: patient  Best Call Back Number: [phone] (home) [phone] (work)  Additional Information: would like a call back to have a endoscopy scheduled please. Thanks.    
Please review and advise on My chart message. Thank you. CLC  
egs sched pt will be notified 1st if anyone cancels  
History of open heart surgery

## 2018-12-17 NOTE — H&P
History & Physical - Short Stay  Gastroenterology      SUBJECTIVE:     Procedure: Gastroscopy    Chief Complaint/Indication for Procedure:  GERD.    History of Present Illness:  Office Visit     11/29/2018  [facility]      Shanna Krueger NP   Family Medicine   Gastroesophageal reflux disease with esophagitis +4 more   Dx   Gastroesophageal Reflux     Reason for Visit    Progress Notes        Subjective:       Patient ID: Eugenie Najera is a 52 y.o. female.     Chief Complaint: Gastroesophageal Reflux (since September)     Ms. Najera is a new patient to me. She presents today for GERD     Gastroesophageal Reflux   She reports no abdominal pain, no chest pain or no coughing. This is a new problem. Episode onset: 2 months ago. Episode frequency: daily. The problem has been unchanged. Nothing aggravates the symptoms. Risk factors include obesity (hx lap band). She has tried a PPI, an antacid and a histamine-2 antagonist for the symptoms. The treatment provided no relief.      Denies dietary or lifestyle change to attribute to new onset reflux     Assessment:       1. Gastroesophageal reflux disease with esophagitis    2. Class 1 obesity due to excess calories with serious comorbidity and body mass index (BMI) of 32.0 to 32.9 in adult    3. Essential (primary) hypertension    4. Neutrophilia    5. Encounter for screening for malignant neoplasm of colon        Plan:       Gastroesophageal reflux disease with esophagitis  -     Case request GI: EGD (ESOPHAGOGASTRODUODENOSCOPY)     Class 1 obesity due to excess calories with serious comorbidity and body mass index (BMI) of 32.0 to 32.9 in adult     Essential (primary) hypertension     Neutrophilia  -     CBC auto differential; Future; Expected date: 11/29/2018     Encounter for screening for malignant neoplasm of colon  -     Case request GI: COLONOSCOPY     Continue nexium and zantac  Advised to notify us if she hasnt heard from GI in 1-2  "weeks  Handout and education provided on GERD, tips to control         Follow-up for further evaluation if s/s worsen, fail to improve, or new symptoms arise.                 PTA Medications   Medication Sig    benzonatate (TESSALON) 100 MG capsule 1 - 2 po every 6 hours prn cough    esomeprazole (NEXIUM) 40 MG capsule Take 40 mg by mouth before breakfast.    hydroCHLOROthiazide (HYDRODIURIL) 25 MG tablet Take 1 tablet (25 mg total) by mouth once daily.    lidocaine HCl 2% (LIDOCAINE VISCOUS) 2 % Soln 5mL by mouth 3 times daily    sucralfate (CARAFATE) 100 mg/mL suspension Take 10 mLs (1 g total) by mouth 4 (four) times daily.    acetaminophen (TYLENOL EXTRA STRENGTH) 500 MG tablet Take 1,000 mg by mouth daily as needed. As directed on package    fluticasone (FLONASE) 50 mcg/actuation nasal spray 1 spray (50 mcg total) by Each Nare route 2 (two) times daily.    phentermine (ADIPEX-P) 37.5 mg tablet TK 1 T PO QAM       Review of patient's allergies indicates:   Allergen Reactions    Penicillin v potassium Swelling        Past Medical History:   Diagnosis Date    Anemia     Hypertension     PONV (postoperative nausea and vomiting)     Thyroid disorder      Past Surgical History:   Procedure Laterality Date     SECTION      X2    DILATION AND CURETTAGE OF UTERUS      lapband  ~2006    (in [place])    TUBAL LIGATION       Family History   Problem Relation Age of Onset    Cancer Father         thyroid cancer     Social History     Tobacco Use    Smoking status: Never Smoker    Smokeless tobacco: Never Used   Substance Use Topics    Alcohol use: Yes     Alcohol/week: 3.0 oz     Types: 5 Glasses of wine per week     Comment: wine, beer    Drug use: No         OBJECTIVE:     Vital Signs (Most Recent)  Temp: 98.1 °F (36.7 °C) (18)  Pulse: 75 (18)  Resp: 16 (18)  BP: (!) 154/73 (18)  SpO2: 95 % (18)    Physical Exam:  : Ht 5' 6" (1.676 m) "   Wt 91.9 kg (202 lb 9.6 oz)   BMI 32.70 kg/m²  GENERAL:  Comfortable, in no acute distress.                                 HEENT EXAM:  Nonicteric.  No adenopathy.  Oropharynx is clear. NECK:  Supple.                                                               LUNGS:  Clear.                                                               CARDIAC:  Regular rate and rhythm.  S1, S2.  No murmur.            ABDOMEN:  Obese.  Soft, positive bowel sounds, nontender.  No hepatosplenomegaly or masses.  No rebound or guarding.             EXTREMITIES:  No edema.     MENTAL STATUS:  Alert and oriented.    ASSESSMENT/PLAN:     Assessment:  GERD.    Plan: Gastroscopy    Anesthesia Plan:   MAC / General Anaesthesia    ASA Grade: ASA 2 - Patient with mild systemic disease with no functional limitations    MALLAMPATI SCORE: II (hard and soft palate, upper portion of tonsils anduvula visible)

## 2018-12-18 ENCOUNTER — ANESTHESIA (OUTPATIENT)
Dept: ENDOSCOPY | Facility: HOSPITAL | Age: 53
End: 2018-12-18
Payer: COMMERCIAL

## 2018-12-18 ENCOUNTER — ANESTHESIA EVENT (OUTPATIENT)
Dept: ENDOSCOPY | Facility: HOSPITAL | Age: 53
End: 2018-12-18
Payer: COMMERCIAL

## 2018-12-18 ENCOUNTER — HOSPITAL ENCOUNTER (OUTPATIENT)
Facility: HOSPITAL | Age: 53
Discharge: HOME | End: 2018-12-18
Attending: INTERNAL MEDICINE | Admitting: INTERNAL MEDICINE
Payer: COMMERCIAL

## 2018-12-18 VITALS
SYSTOLIC BLOOD PRESSURE: 130 MMHG | DIASTOLIC BLOOD PRESSURE: 82 MMHG | HEIGHT: 66 IN | WEIGHT: 185 LBS | TEMPERATURE: 98 F | RESPIRATION RATE: 16 BRPM | BODY MASS INDEX: 29.73 KG/M2 | HEART RATE: 79 BPM | OXYGEN SATURATION: 98 %

## 2018-12-18 DIAGNOSIS — K21.9 GERD (GASTROESOPHAGEAL REFLUX DISEASE): ICD-10-CM

## 2018-12-18 LAB — H PYLORI INDEX VALUE: POSITIVE

## 2018-12-18 PROCEDURE — D9220A PRA ANESTHESIA: Mod: CRNA,,, | Performed by: NURSE ANESTHETIST, CERTIFIED REGISTERED

## 2018-12-18 PROCEDURE — 43239 EGD BIOPSY SINGLE/MULTIPLE: CPT | Mod: PO | Performed by: INTERNAL MEDICINE

## 2018-12-18 PROCEDURE — 37000009 HC ANESTHESIA EA ADD 15 MINS: Mod: PO | Performed by: INTERNAL MEDICINE

## 2018-12-18 PROCEDURE — 88305 TISSUE EXAM BY PATHOLOGIST: CPT | Mod: 26,,, | Performed by: PATHOLOGY

## 2018-12-18 PROCEDURE — 25000003 PHARM REV CODE 250: Mod: PO | Performed by: NURSE ANESTHETIST, CERTIFIED REGISTERED

## 2018-12-18 PROCEDURE — D9220A PRA ANESTHESIA: Mod: ANES,,, | Performed by: ANESTHESIOLOGY

## 2018-12-18 PROCEDURE — 43239 EGD BIOPSY SINGLE/MULTIPLE: CPT | Mod: 59,,, | Performed by: INTERNAL MEDICINE

## 2018-12-18 PROCEDURE — 87449 NOS EACH ORGANISM AG IA: CPT | Mod: PO | Performed by: INTERNAL MEDICINE

## 2018-12-18 PROCEDURE — 27201012 HC FORCEPS, HOT/COLD, DISP: Mod: PO | Performed by: INTERNAL MEDICINE

## 2018-12-18 PROCEDURE — 37000008 HC ANESTHESIA 1ST 15 MINUTES: Mod: PO | Performed by: INTERNAL MEDICINE

## 2018-12-18 PROCEDURE — 43248 EGD GUIDE WIRE INSERTION: CPT | Mod: PO | Performed by: INTERNAL MEDICINE

## 2018-12-18 PROCEDURE — 25000003 PHARM REV CODE 250: Mod: PO | Performed by: INTERNAL MEDICINE

## 2018-12-18 PROCEDURE — 63600175 PHARM REV CODE 636 W HCPCS: Mod: PO | Performed by: NURSE ANESTHETIST, CERTIFIED REGISTERED

## 2018-12-18 PROCEDURE — 43248 EGD GUIDE WIRE INSERTION: CPT | Mod: ,,, | Performed by: INTERNAL MEDICINE

## 2018-12-18 PROCEDURE — 88305 TISSUE EXAM BY PATHOLOGIST: CPT | Performed by: PATHOLOGY

## 2018-12-18 RX ORDER — GLYCOPYRROLATE 0.2 MG/ML
INJECTION INTRAMUSCULAR; INTRAVENOUS
Status: DISCONTINUED | OUTPATIENT
Start: 2018-12-18 | End: 2018-12-18

## 2018-12-18 RX ORDER — SODIUM CHLORIDE 0.9 % (FLUSH) 0.9 %
3 SYRINGE (ML) INJECTION
Status: DISCONTINUED | OUTPATIENT
Start: 2018-12-18 | End: 2018-12-18 | Stop reason: HOSPADM

## 2018-12-18 RX ORDER — SODIUM CHLORIDE, SODIUM LACTATE, POTASSIUM CHLORIDE, CALCIUM CHLORIDE 600; 310; 30; 20 MG/100ML; MG/100ML; MG/100ML; MG/100ML
INJECTION, SOLUTION INTRAVENOUS CONTINUOUS
Status: DISCONTINUED | OUTPATIENT
Start: 2018-12-18 | End: 2018-12-18 | Stop reason: HOSPADM

## 2018-12-18 RX ORDER — LIDOCAINE HCL/PF 100 MG/5ML
SYRINGE (ML) INTRAVENOUS
Status: DISCONTINUED | OUTPATIENT
Start: 2018-12-18 | End: 2018-12-18

## 2018-12-18 RX ORDER — SUCRALFATE 1 G/1
1 TABLET ORAL
Qty: 120 TABLET | Refills: 11 | Status: SHIPPED | OUTPATIENT
Start: 2018-12-18 | End: 2018-12-18 | Stop reason: SDUPTHER

## 2018-12-18 RX ORDER — PROPOFOL 10 MG/ML
VIAL (ML) INTRAVENOUS
Status: DISCONTINUED | OUTPATIENT
Start: 2018-12-18 | End: 2018-12-18

## 2018-12-18 RX ORDER — PROPOFOL 10 MG/ML
VIAL (ML) INTRAVENOUS CONTINUOUS PRN
Status: DISCONTINUED | OUTPATIENT
Start: 2018-12-18 | End: 2018-12-18

## 2018-12-18 RX ORDER — SUCRALFATE 1 G/1
1 TABLET ORAL
Qty: 120 TABLET | Refills: 11 | Status: SHIPPED | OUTPATIENT
Start: 2018-12-18 | End: 2019-03-19

## 2018-12-18 RX ADMIN — PROPOFOL 200 MCG/KG/MIN: 10 INJECTION, EMULSION INTRAVENOUS at 10:12

## 2018-12-18 RX ADMIN — PROPOFOL 100 MG: 10 INJECTION, EMULSION INTRAVENOUS at 10:12

## 2018-12-18 RX ADMIN — SODIUM CHLORIDE, SODIUM LACTATE, POTASSIUM CHLORIDE, AND CALCIUM CHLORIDE: .6; .31; .03; .02 INJECTION, SOLUTION INTRAVENOUS at 09:12

## 2018-12-18 RX ADMIN — PROPOFOL 50 MG: 10 INJECTION, EMULSION INTRAVENOUS at 10:12

## 2018-12-18 RX ADMIN — LIDOCAINE HYDROCHLORIDE 100 MG: 20 INJECTION, SOLUTION INTRAVENOUS at 10:12

## 2018-12-18 RX ADMIN — GLYCOPYRROLATE 0.2 MG: 0.2 INJECTION, SOLUTION INTRAMUSCULAR; INTRAVENOUS at 10:12

## 2018-12-18 NOTE — ANESTHESIA PREPROCEDURE EVALUATION
12/18/2018  Eugenie Najera is a 53 y.o., female.    Anesthesia Evaluation      I have reviewed the Medications.     Review of Systems  Anesthesia Hx:  No problems with previous Anesthesia Hx of Anesthetic complications (PONV)   Social:  Non-Smoker, No Alcohol Use    Cardiovascular:   Hypertension    Pulmonary:  Pulmonary Normal    Renal/:  Renal/ Normal     Hepatic/GI:   GERD, poorly controlled Lap band   Neurological:  Neurology Normal    Endocrine:  Endocrine Normal        Physical Exam  General:  Well nourished    Airway/Jaw/Neck:  Airway Findings: Mouth Opening: Normal Tongue: Normal  General Airway Assessment: Adult, Average  Mallampati: II  Jaw/Neck Findings:  Neck ROM: Normal ROM       Chest/Lungs:  Chest/Lungs Findings: Clear to auscultation, Normal Respiratory Rate     Heart/Vascular:  Heart Findings: Rate: Normal  Rhythm: Regular Rhythm  Sounds: Normal  Heart murmur: negative       Mental Status:  Mental Status Findings:  Cooperative, Alert and Oriented         Anesthesia Plan  Type of Anesthesia, risks & benefits discussed:  Anesthesia Type:  general  Patient's Preference:   Intra-op Monitoring Plan:   Intra-op Monitoring Plan Comments:   Post Op Pain Control Plan:   Post Op Pain Control Plan Comments:   Induction:   IV  Beta Blocker:  Patient is not currently on a Beta-Blocker (No further documentation required).       Informed Consent: Patient understands risks and agrees with Anesthesia plan.  Questions answered. Anesthesia consent signed with patient.  ASA Score: 2     Day of Surgery Review of History & Physical:            Ready For Surgery From Anesthesia Perspective.

## 2018-12-18 NOTE — BRIEF OP NOTE
"Discharge Note  Short Stay      SUMMARY     Admit Date: 12/18/2018    Attending Physician: Junito Baird Jr., MD     Discharge Physician: Junito Baird Jr., MD    Discharge Date: 12/18/2018 11:09 AM    Final Diagnosis: Gastroesophageal reflux disease with esophagitis [K21.0]  Impression:          - Small polypoid "mass" found in the right pyriform                        sinus, not obstructing the airway.                       - Normal cricopharyngeus.                       - Food in the mid esophagus.                       - Non-severe chronic (stasis?) esophagitis.                       - Normal upper third of esophagus and middle third                        of esophagus.                       - Z-line irregular, 35 cm from the incisors.                        Biopsied.                       - Small pseudo - hiatal hernia.                       - Extrinsic compression in the cardia, due to the                        gastric band device.                       - Gastric stenosis was found in the cardia, due to                        the gastric band device.                       - Normal stomach. Biopsied.                       - Normal examined duodenum.                       - Esophagus / cardia dilated. Dilated, 51 Fr and 54                        Fr.  Recommendation:      - Discharge patient to home.                       - Observe patient's clinical course following                        today's procedure with therapeutic intervention.                       - Do an upper GI series.                       - Refer to a bariatric surgeon, for removal of the                        gastric band, and consideration of a sleeve                        gastrectomy.                       - Call the G.I. clinic in 2 weeks for reports (if                        you haven't heard from us sooner) [phone].  Junito Baird MD  12/18/2018   Disposition: HOME OR SELF CARE    Patient Instructions:   Current Discharge " Medication List      CONTINUE these medications which have NOT CHANGED    Details   benzonatate (TESSALON) 100 MG capsule 1 - 2 po every 6 hours prn cough  Qty: 45 capsule, Refills: 0    Associated Diagnoses: Upper respiratory tract infection, unspecified type      esomeprazole (NEXIUM) 40 MG capsule Take 40 mg by mouth before breakfast.      hydroCHLOROthiazide (HYDRODIURIL) 25 MG tablet Take 1 tablet (25 mg total) by mouth once daily.  Qty: 90 tablet, Refills: 1    Associated Diagnoses: Essential (primary) hypertension      lidocaine HCl 2% (LIDOCAINE VISCOUS) 2 % Soln 5mL by mouth 3 times daily  Qty: 100 mL, Refills: 0    Associated Diagnoses: Reflux gastritis      sucralfate (CARAFATE) 100 mg/mL suspension Take 10 mLs (1 g total) by mouth 4 (four) times daily.  Qty: 414 mL, Refills: 0      acetaminophen (TYLENOL EXTRA STRENGTH) 500 MG tablet Take 1,000 mg by mouth daily as needed. As directed on package      fluticasone (FLONASE) 50 mcg/actuation nasal spray 1 spray (50 mcg total) by Each Nare route 2 (two) times daily.  Qty: 1 Bottle, Refills: 2      phentermine (ADIPEX-P) 37.5 mg tablet TK 1 T PO QAM  Qty: 30 tablet, Refills: 0    Associated Diagnoses: Obesity (BMI 30-39.9)             Discharge Procedure Orders (must include Diet, Follow-up, Activity)    Follow Up:  Follow up with PCP as per your routine.  Please follow a mechanical soft diet.  Activity as tolerated.    No driving day of procedure.

## 2018-12-18 NOTE — ANESTHESIA POSTPROCEDURE EVALUATION
"Anesthesia Post Evaluation    Patient: Eugenie Najera    Procedure(s) Performed: Procedure(s) (LRB):  EGD (ESOPHAGOGASTRODUODENOSCOPY) (N/A)    Final Anesthesia Type: general  Patient location during evaluation: PACU  Patient participation: Yes- Able to Participate  Level of consciousness: awake and alert  Post-procedure vital signs: reviewed and stable  Pain management: adequate  Airway patency: patent  PONV status at discharge: No PONV  Anesthetic complications: no      Cardiovascular status: hemodynamically stable and blood pressure returned to baseline  Respiratory status: unassisted, spontaneous ventilation and room air  Hydration status: euvolemic  Follow-up not needed.        Visit Vitals  /82   Pulse 79   Temp 36.6 °C (97.9 °F) (Skin)   Resp 16   Ht 5' 6" (1.676 m)   Wt 83.9 kg (185 lb)   LMP 08/09/2015   SpO2 98%   Breastfeeding? No   BMI 29.86 kg/m²       Pain/Jacki Score: Jacki Score: 10 (12/18/2018 11:16 AM)        "

## 2018-12-18 NOTE — [employer] PATIENT INSTRUCTIONS
Discharge Summary/Instructions after an Endoscopic Procedure  Patient Name: Eugenie Najera  Patient MRN: [id number]  Patient YOB: 1965 Tuesday, December 18, 2018  Junito Baird MD  RESTRICTIONS:  During your procedure today, you received medications for sedation.  These   medications may affect your judgment, balance and coordination.  Therefore,   for 24 hours, you have the following restrictions:   - DO NOT drive a car, operate machinery, make legal/financial decisions,   sign important papers or drink alcohol.    ACTIVITY:  Today: no heavy lifting, straining or running due to procedural   sedation/anesthesia.  The following day: return to full activity including work.  DIET:  Eat and drink normally unless instructed otherwise.     TREATMENT FOR COMMON SIDE EFFECTS:  - Mild abdominal pain, nausea, belching, bloating or excessive gas:  rest,   eat lightly and use a heating pad.  - Sore Throat: treat with throat lozenges and/or gargle with warm salt   water.  - Because air was used during the procedure, expelling large amounts of air   from your rectum or belching is normal.  - If a bowel prep was taken, you may not have a bowel movement for 1-3 days.    This is normal.  SYMPTOMS TO WATCH FOR AND REPORT TO YOUR PHYSICIAN:  1. Abdominal pain or bloating, other than gas cramps.  2. Chest pain.  3. Back pain.  4. Signs of infection such as: chills or fever occurring within 24 hours   after the procedure.  5. Rectal bleeding, which would show as bright red, maroon, or black stools.   (A tablespoon of blood from the rectum is not serious, especially if   hemorrhoids are present.)  6. Vomiting.  7. Weakness or dizziness.  GO DIRECTLY TO THE NEAREST EMERGENCY ROOM IF YOU HAVE ANY OF THE FOLLOWING:      Difficulty breathing              Chills and/or fever over 101 F   Persistent vomiting and/or vomiting blood   Severe abdominal pain   Severe chest pain   Black, tarry stools   Bleeding- more than one  tablespoon   Any other symptom or condition that you feel may need urgent attention  Your doctor recommends these additional instructions:  If any biopsies were taken, your doctors clinic will contact you in 1 to 2   weeks with any results.  You are being discharged to home.   Your physician has recommended an upper GI series.   An appointment has been made (or make an appointment) to see a bariatric   surgeon, for removal of the gastric band, and consideration of a sleeve   gastrectomy. .  For questions, problems or results please call your physician - Junito Baird MD at Work:  [phone].  EMERGENCY PHONE NUMBER: [phone], LAB RESULTS: [phone]  IF A COMPLICATION OR EMERGENCY SITUATION ARISES AND YOU ARE UNABLE TO REACH   YOUR PHYSICIAN - GO DIRECTLY TO THE EMERGENCY ROOM.  ___________________________________________  Nurse Signature  ___________________________________________  Patient/Designated Responsible Party Signature  Junito Baird MD  12/18/2018 11:05:53 AM  This report has been verified and signed electronically.  [employer]

## 2018-12-18 NOTE — TRANSFER OF CARE
"Anesthesia Transfer of Care Note    Patient: Eugenie Najera    Procedure(s) Performed: Procedure(s) (LRB):  EGD (ESOPHAGOGASTRODUODENOSCOPY) (N/A)    Patient location: PACU    Anesthesia Type: general    Transport from OR: Transported from OR on room air with adequate spontaneous ventilation    Post pain: adequate analgesia    Post assessment: no apparent anesthetic complications and tolerated procedure well    Post vital signs: stable    Level of consciousness: sedated    Nausea/Vomiting: no nausea/vomiting    Complications: none    Transfer of care protocol was followed      Last vitals:   Visit Vitals  BP (!) 102/58 (BP Location: Left arm, Patient Position: Lying)   Pulse 76   Temp 36.6 °C (97.9 °F) (Skin)   Resp 16   Ht 5' 6" (1.676 m)   Wt 83.9 kg (185 lb)   LMP 08/09/2015   SpO2 97%   Breastfeeding? No   BMI 29.86 kg/m²     "

## 2018-12-18 NOTE — DISCHARGE INSTRUCTIONS
Soft Diet  Your healthcare provider has prescribed a soft diet (also called gastrointestinal soft diet). This means eating foods that are soft, low in fiber, and easy to digest. This diet is for people with digestive problems. A soft diet provides foods that are easy to chew and swallow. Foods should be bite-size and very soft or moist. Follow your healthcare providers specific instructions about what foods and drinks you may have. The general guidelines below can help you get started on this diet.       Beverages  OK: Milk, tea, coffee, fruit juices, carbonated beverages, nutrition shakes, and drinks (Note: Thin liquids may be hard to swallow. They may need to be thickened.)  Avoid: None, unless they need to be thickened  Breads and crackers  OK: Refined white, wheat, or seedless rye bread; twan or soda crackers that have been moistened; plain rolls or bagels; very soft tortillas  Avoid: Whole-grain breads, rolls, or bagels with nuts, raisins, or seeds; crackers, croutons, taco shells  Cereals and grains  OK: Cooked cereals, plain dry cereals that have been moistened, plain macaroni, spaghetti, noodles, rice  Avoid: Whole-grain cereals and granola, or cereals containing bran, raisins, seeds or nuts; coconut; brown or wild rice  Desserts and sweets  OK: Moist cake; soft fruit pie with bottom crust only; soft cookies moistened in milk or other liquid; gelatin, custard, pudding, plain ice cream, plain sherbet, sugar, honey, clear jelly  Avoid: Pastries, desserts, and ice cream that have nuts, coconut, seeds, or dried fruit; popcorn; chips of any kind including potato chips and tortilla chips; jam, marmalade  Eggs and cheese  OK: Poached, soft boiled, or scrambled eggs; cottage cheese, ricotta cheese, cream cheese, cheese sauces, or cheese melted in other dishes  Avoid: Crisp fried eggs, cheese slices and cubes  Fruits  OK: Avocado, banana, baked peeled apple, applesauce, peeled ripe peaches or pears, canned fruit  (apricots, cherries, peaches, pears), melons  Avoid: Raw apple, dried fruits, coconut, pineapple, grapes, fruit demi, fruit snacks  Meat and fish  OK: All fresh meat, poultry, or fish that is cooked until tender  Avoid: Meat, fish, or poultry that is fried; tough or stringy meat including haskins, sausage, bratwurst, jerky, corned beef  Other protein foods  OK: Tofu, baked beans, smooth peanut butter or other nut or seed butters  Avoid: Deep-fried tofu; crunchy peanut or other nut or seed butters; nuts or seeds that are whole or chopped  Soups  OK: All soups, but they may need to be thickened. Thin liquid may be too hard to swallow.  Avoid: Soups made with stringy meat pieces or chunky vegetables  Vegetables  OK: Peeled and well-cooked potatoes or sweet potatoes; fresh, cooked, canned, or frozen vegetables without seeds, skin, or coarse fiber  Avoid: Raw vegetables, deep-fried vegetables (such as tempura), and corn  Date Last Reviewed: 8/1/2016  © 1062-2224 [employer]. [address] [place] [address], [place] [address]. All rights reserved. This information is not intended as a substitute for professional medical care. Always follow your healthcare professional's instructions.      Refer to a bariatric surgeon, for removal of the gastric band, and consideration of a sleeve gastrectomy.

## 2018-12-19 ENCOUNTER — TELEPHONE (OUTPATIENT)
Dept: SURGERY | Facility: CLINIC | Age: 53
End: 2018-12-19

## 2018-12-20 ENCOUNTER — TELEPHONE (OUTPATIENT)
Dept: GASTROENTEROLOGY | Facility: CLINIC | Age: 53
End: 2018-12-20

## 2018-12-20 ENCOUNTER — TELEPHONE (OUTPATIENT)
Dept: OTOLARYNGOLOGY | Facility: CLINIC | Age: 53
End: 2018-12-20

## 2018-12-20 RX ORDER — CLARITHROMYCIN 500 MG/1
500 TABLET, FILM COATED ORAL 2 TIMES DAILY WITH MEALS
Qty: 20 TABLET | Refills: 0 | Status: SHIPPED | OUTPATIENT
Start: 2018-12-20 | End: 2018-12-30

## 2018-12-20 RX ORDER — PANTOPRAZOLE SODIUM 40 MG/1
40 TABLET, DELAYED RELEASE ORAL
Qty: 30 TABLET | Refills: 1 | Status: SHIPPED | OUTPATIENT
Start: 2018-12-20 | End: 2019-01-04

## 2018-12-20 RX ORDER — METRONIDAZOLE 500 MG/1
500 TABLET ORAL
Qty: 30 TABLET | Refills: 0 | Status: SHIPPED | OUTPATIENT
Start: 2018-12-20 | End: 2018-12-30

## 2018-12-20 NOTE — TELEPHONE ENCOUNTER
Let her know that the test for H pylorii was positive and we want to treat her for that with antibx.    Biaxin 500, # 20, 1 po bid with meals x 10 days.  Metronidazole 500 mg, # 30, 1 po tid with meals x 10 days.  Pantoprazole 40 mg,  1 po bid (before breakfast and 1 hour before supper) for 2 weeks.  Hold the Nexium while you're taking this, and resume it after the 2 weeks.    You can wait til after Xmas to start this if you want.      Also, I showed your pictures to the ENT and she (Dr. Horowitz) would like to see you and your throat (after the first of the year).  (Help her coordinate this with ENT).

## 2018-12-21 ENCOUNTER — TELEPHONE (OUTPATIENT)
Dept: GASTROENTEROLOGY | Facility: CLINIC | Age: 53
End: 2018-12-21

## 2018-12-21 ENCOUNTER — PATIENT MESSAGE (OUTPATIENT)
Dept: GASTROENTEROLOGY | Facility: CLINIC | Age: 53
End: 2018-12-21

## 2018-12-21 NOTE — TELEPHONE ENCOUNTER
Pt notified positive for h pylori, instr on meds. Instr this info sent to portal also. Pt verbs understanding

## 2018-12-21 NOTE — TELEPHONE ENCOUNTER
----- Message from Cecille Almazan sent at 12/21/2018  9:34 AM CST -----  Contact: Eugenie  Type:  Patient Returning Call    Who Called:  patient  Who Left Message for Patient:  Carol  Does the patient know what this is regarding?:  Patient is confused as to why she needs to see Dr. Horowitz for an appt  Best Call Back Number:  [phone]  Additional Information:  Please advise--thank you

## 2018-12-31 DIAGNOSIS — I10 ESSENTIAL (PRIMARY) HYPERTENSION: ICD-10-CM

## 2018-12-31 DIAGNOSIS — E66.9 OBESITY (BMI 30-39.9): ICD-10-CM

## 2018-12-31 RX ORDER — PHENTERMINE HYDROCHLORIDE 37.5 MG/1
TABLET ORAL
Qty: 30 TABLET | Refills: 0 | Status: CANCELLED | OUTPATIENT
Start: 2018-12-31

## 2018-12-31 NOTE — PROGRESS NOTES
Refill Authorization Note     is requesting a refill authorization.    Brief assessment and rationale for refill: DEFER: Needs Labs/ Needs Appt  Amount/Quantity of medication ordered: 90d        Refills Authorized: Yes  If authorized number of refills: 0        Medication-related problems identified:   Requires labs  Non-adherence - intentional  Requires appointment  Medication Therapy Plan: HTN; NTBO(Cr/Na/K); BP-controlled; med-minded indicates pt is NOT adherent in taking HCTZ; pt was asked to scheduled follow up appt, Pt does not want to return for F/u appt, unclear if pt will follow up with labs; Defer to you   Name and strength of medication: hydroCHLOROthiazide (HYDRODIURIL) 25 MG tablet  How patient will take medication: t1t po qd  Medication reconciliation completed: No        Comments:      Blood Pressure Readings:  BP Readings from Last 3 Encounters:   12/18/18 130/82   11/29/18 130/72   09/21/18 (!) 152/101       Last Kidney (6 months -Diuretics or 12 month non-diuretics)  Lab Results   Component Value Date    CREATININE 0.7 05/22/2018    K 4.5 05/22/2018     05/22/2018     Lab Results   Component Value Date     05/22/2018    BUN 9 05/22/2018    CO2 30 (H) 05/22/2018     Last visit with authorizing provider:   Ramon Mcleod MD: 5/22/2018     Next visit with authorizing provider:   Ramon Mcleod MD: Visit date not found

## 2019-01-02 RX ORDER — HYDROCHLOROTHIAZIDE 25 MG/1
25 TABLET ORAL DAILY
Qty: 90 TABLET | Refills: 0 | Status: SHIPPED | OUTPATIENT
Start: 2019-01-02 | End: 2019-03-26 | Stop reason: SDUPTHER

## 2019-01-02 NOTE — TELEPHONE ENCOUNTER
Please advise in Dr. Mcleod' absence.     DEFER: Hydrochlorothiazide 25mg once daily    Not previously prescribed by Dr. Mcleod. Last prescribed by Dr. Cheema (5/18). Labs due (Scr/Na/K) while on diuretic - to be ordered.     Also see communication below regarding follow-up. LOV with Dr. Mcleod 5/2018.

## 2019-01-08 ENCOUNTER — OFFICE VISIT (OUTPATIENT)
Dept: OTOLARYNGOLOGY | Facility: CLINIC | Age: 54
End: 2019-01-08
Payer: COMMERCIAL

## 2019-01-08 VITALS
HEIGHT: 66 IN | BODY MASS INDEX: 33.7 KG/M2 | SYSTOLIC BLOOD PRESSURE: 138 MMHG | WEIGHT: 209.69 LBS | DIASTOLIC BLOOD PRESSURE: 89 MMHG

## 2019-01-08 DIAGNOSIS — J39.2 LESION OF HYPOPHARYNX: ICD-10-CM

## 2019-01-08 DIAGNOSIS — K21.9 GASTROESOPHAGEAL REFLUX DISEASE, ESOPHAGITIS PRESENCE NOT SPECIFIED: Primary | ICD-10-CM

## 2019-01-08 DIAGNOSIS — A04.8 H. PYLORI INFECTION: ICD-10-CM

## 2019-01-08 PROCEDURE — 99999 PR PBB SHADOW E&M-EST. PATIENT-LVL II: ICD-10-PCS | Mod: PBBFAC,,, | Performed by: OTOLARYNGOLOGY

## 2019-01-08 PROCEDURE — 3008F PR BODY MASS INDEX (BMI) DOCUMENTED: ICD-10-PCS | Mod: CPTII,S$GLB,, | Performed by: OTOLARYNGOLOGY

## 2019-01-08 PROCEDURE — 31575 DIAGNOSTIC LARYNGOSCOPY: CPT | Mod: S$GLB,,, | Performed by: OTOLARYNGOLOGY

## 2019-01-08 PROCEDURE — 99999 PR PBB SHADOW E&M-EST. PATIENT-LVL II: CPT | Mod: PBBFAC,,, | Performed by: OTOLARYNGOLOGY

## 2019-01-08 PROCEDURE — 3008F BODY MASS INDEX DOCD: CPT | Mod: CPTII,S$GLB,, | Performed by: OTOLARYNGOLOGY

## 2019-01-08 PROCEDURE — 99204 PR OFFICE/OUTPT VISIT, NEW, LEVL IV, 45-59 MIN: ICD-10-PCS | Mod: 25,S$GLB,, | Performed by: OTOLARYNGOLOGY

## 2019-01-08 PROCEDURE — 99204 OFFICE O/P NEW MOD 45 MIN: CPT | Mod: 25,S$GLB,, | Performed by: OTOLARYNGOLOGY

## 2019-01-08 PROCEDURE — 31575 PR LARYNGOSCOPY, FLEXIBLE; DIAGNOSTIC: ICD-10-PCS | Mod: S$GLB,,, | Performed by: OTOLARYNGOLOGY

## 2019-01-08 NOTE — LETTER
January 8, 2019      Junito Baird Jr., MD  [address] [facility] [address] [place] [address]           [facility]  [address] [facility] [address] [place] [address]  Phone: [phone]  Fax: [phone]          Patient: Eugenie Najera   MR Number: [id number]   YOB: 1965   Date of Visit: 1/8/2019       Dear Dr. Junito Baird Jr.:    Thank you for referring Eugenie Najera to me for evaluation. Attached you will find relevant portions of my assessment and plan of care.    If you have questions, please do not hesitate to call me. I look forward to following Eugenie Najera along with you.    Sincerely,    Kia Horowitz MD    Enclosure  CC:  No Recipients    If you would like to receive this communication electronically, please contact [email or web address] or [phone] to request more information on [facility] access.    For providers and/or their staff who would like to refer a patient to [facility], please contact us through our one-stop-shop provider referral line, [facility], at [phone].    If you feel you have received this communication in error or would no longer like to receive these types of communications, please e-mail [email or web address]

## 2019-01-08 NOTE — PROGRESS NOTES
Date of Encounter:   Provider: Kia Horowitz MD  Referring MD:  MD Oli  PCP:    CC:  Patient was referred by Dr. Baird for evaluation of a lesion involving the right piriform sinus incidentally seen on EGD    HPI:    Patient is a 53-year-old female with a history of morbid obesity who is status post a lap banding with significant GERD following surgery. She was seen by Dr. Baird who performed an EGD.  She was noted at that time to have a culture positive for H pylori and a well-circumscribed lesion involving the right piriform sinus.  Patient is on antibiotics for H pylori and she was referred to me for evaluation of this lesion    Patient is a nonsmoker/never smoker.  She denies dysphagia, odynophagia, hemoptysis, otalgia and weight loss.  Essentially she is asymptomatic    ROS: see HPI also  Constitutional: Negative for activity change and appetite change, weight loss.   Eyes: Negative for discharge, visual changes.   Respiratory: Negative for difficulty breathing and wheezing   Cardiovascular: Negative for chest pain.   Gastrointestinal: + GERD; + H pylori   Endocrine: Negative for cold intolerance and heat intolerance.   Genitourinary: Negative for dysuria.   Musculoskeletal: Negative for gait problem, muscle pain and joint swelling.   Skin: Negative for color change and pallor; negative for skin lesions.   Neurological: Negative for syncope and weakness; no numbness face.   Psychiatric/Behavioral: Negative for agitation and confusion; negative for depression.    Physical Exam:      Constitutional  · General Appearance: well nourished, well-developed, alert, oriented, in no acute distress  · Communication: ability, understanding, normal; no hoarseness  Head and Face  · Inspection: normocephalic, atraumatic, no scars, lesions or masses   · Palpation: no stepoffs, sinus tenderness or masses  · Parotid glands: no masses, stones, swelling or tenderness  Eyes  · Ocular Motility / Alignment: normal alignment,  motility, no proptosis, enophthalmus or nystagmus  · Conjunctiva: not injected  · Eyelids: no hooding, lag, entropion, or ectropion  Ears  · Hearing: speech reception thresholds grossly normal  · External Ears: no auricle lesions, non-tender, mobile to palpation  · Otoscopy:  · Right Ear: no tympanic membrane lesions, perforations, or effusion, normal EAC  · Left Ear: no tympanic membrane lesions, perforations, or effusion, normal EAC  Nose  · External Nose: no lesions, tenderness, trauma or deformity  · Intranasal Exam: no edema, erythema, discharge, mass or obstruction  Oral Cavity / Oropharynx  · Lips: upper and lower lips pink and moist  · Teeth: good dentition  · Gingiva: healthy  · Oral Mucosa: moist, no mucosal lesions  · Floor of Mouth: normal, no lesions, salivary ducts patent  · Tongue: moist, normal mobility, no lesions  · Palate: soft and hard palates without lesions or ulcers  Oropharynx: tonsils and walls without erythema, exudate, base of tongue soft to palpation  Nasopharynx, Hypopharynx, and Larynx  · Indirect: could not perform exam due to intolerance by patient  Neck  · Inspection and Palpation: no erythema, induration, emphysema, tenderness or masses  · Larynx and Trachea: normal position; normal crepitus  · Thyroid: no tenderness, enlargement or nodules  · Submandibular Glands: no masses or tenderness  Lymphatic:  · Anterior, Posterior, Submandibular, Submental, Supraclavicular: no lymphadenopathy present  Chest / Respiratory  · Chest: no stridor or retractions, normal effort and expansion  Cardiovascular:  · Pulses: 2+ carotid pulses bilaterally  · Auscultation: deferred  Neurological  · Cranial Nerves: grossly intact  · General: no focal deficits  Psychiatric  · Orientation: oriented to time, place and person  · Mood and Affect: no depression, anxiety or agitation  Extremities  · Inspection: moves all extremities well  Donor site  Chest, Back, Abdomen, Arms, Legs: N/A    PROCEDURES:    -------------------- LARYNGOSCOPY / NASOPHARYNGOSCOPY -------------------------     Pre-op DX: lesion right pyriform sinus  Post-op DX: same  Anesthesia: Topical Neosynephrine / Tetracaine  Indications: to look at lesion  Adverse Events: None        Procedure in Detail:     Flexible endoscopy with video was performed through the nasal passages. The nasal cavity, nasopharynx, oropharynx, hypopharynx and larynx were adequately visualized. The true vocal cords and arytenoids were examined during phonation and repose.        Operative Findings:     Nasal Cavity: Within normal limits  Nasopharynx: Within normal limits  Tongue Base: Within normal limits  Pharyngeal Walls: Within normal limits  Epiglottis / Aryepiglottic Folds: Within normal limits  Pyriform Sinus: smooth, round submucosal mass medial wall right pyriform sinus  Vocal Cords: Within normal limits  Arytenoids: Within normal limits; mild interarytenoid edema       I personally reviewed the following imaging: photo from Dr Baird's EGD       Assessment:   Submucosal lesion involving the right medial piriform sinus-this is benign in appearance; patient asymptomatic  GERD and H pylori    Plan:  I recommended the patient undergo a direct laryngoscopy and biopsy of this lesion.  She has a very stressful job and will have some relief in 6 weeks so she would like to delay this procedure  She will follow up with me in 6 weeks for re-evaluation and scheduling of a biopsy under general anesthesia

## 2019-02-19 ENCOUNTER — OFFICE VISIT (OUTPATIENT)
Dept: OTOLARYNGOLOGY | Facility: CLINIC | Age: 54
End: 2019-02-19
Payer: COMMERCIAL

## 2019-02-19 VITALS
SYSTOLIC BLOOD PRESSURE: 123 MMHG | WEIGHT: 211.19 LBS | BODY MASS INDEX: 33.94 KG/M2 | DIASTOLIC BLOOD PRESSURE: 86 MMHG | HEIGHT: 66 IN

## 2019-02-19 DIAGNOSIS — J38.7 LESION OF LARYNX: Primary | ICD-10-CM

## 2019-02-19 PROCEDURE — 99999 PR PBB SHADOW E&M-EST. PATIENT-LVL III: CPT | Mod: PBBFAC,,, | Performed by: OTOLARYNGOLOGY

## 2019-02-19 PROCEDURE — 99999 PR PBB SHADOW E&M-EST. PATIENT-LVL III: ICD-10-PCS | Mod: PBBFAC,,, | Performed by: OTOLARYNGOLOGY

## 2019-02-19 PROCEDURE — 99213 OFFICE O/P EST LOW 20 MIN: CPT | Mod: 25,S$GLB,, | Performed by: OTOLARYNGOLOGY

## 2019-02-19 PROCEDURE — 3008F PR BODY MASS INDEX (BMI) DOCUMENTED: ICD-10-PCS | Mod: CPTII,S$GLB,, | Performed by: OTOLARYNGOLOGY

## 2019-02-19 PROCEDURE — 99213 PR OFFICE/OUTPT VISIT, EST, LEVL III, 20-29 MIN: ICD-10-PCS | Mod: 25,S$GLB,, | Performed by: OTOLARYNGOLOGY

## 2019-02-19 PROCEDURE — 31575 DIAGNOSTIC LARYNGOSCOPY: CPT | Mod: S$GLB,,, | Performed by: OTOLARYNGOLOGY

## 2019-02-19 PROCEDURE — 3008F BODY MASS INDEX DOCD: CPT | Mod: CPTII,S$GLB,, | Performed by: OTOLARYNGOLOGY

## 2019-02-19 PROCEDURE — 31575 PR LARYNGOSCOPY, FLEXIBLE; DIAGNOSTIC: ICD-10-PCS | Mod: S$GLB,,, | Performed by: OTOLARYNGOLOGY

## 2019-02-19 NOTE — PROGRESS NOTES
Date of Encounter:   Provider: Kia Horowitz MD  Referring MD:  MD Oli  PCP:    CC:  Patient was referred by Dr. Baird for evaluation of a lesion involving the right piriform sinus incidentally seen on EGD    HPI:    Patient is a 53-year-old female with a history of morbid obesity who is status post a lap banding with significant GERD following surgery. She was seen by Dr. Baird who performed an EGD.  She was noted at that time to have a culture positive for H pylori and a well-circumscribed lesion involving the right piriform sinus.  Patient is on antibiotics for H pylori and she was referred to me for evaluation of this lesion    Patient is a nonsmoker/never smoker.  She denies dysphagia, odynophagia, hemoptysis, otalgia and weight loss.  Essentially she is asymptomatic    2/19/2019   Patient is here today for re-evaluation and possible preop.  She has no complaints    ROS: see HPI also  Constitutional: Negative for activity change and appetite change, weight loss.   Eyes: Negative for discharge, visual changes.   Respiratory: Negative for difficulty breathing and wheezing   Cardiovascular: Negative for chest pain.   Gastrointestinal: + GERD; + H pylori --treated  Endocrine: Negative for cold intolerance and heat intolerance.   Genitourinary: Negative for dysuria.   Musculoskeletal: Negative for gait problem, muscle pain and joint swelling.   Skin: Negative for color change and pallor; negative for skin lesions.   Neurological: Negative for syncope and weakness; no numbness face.   Psychiatric/Behavioral: Negative for agitation and confusion; negative for depression.    Physical Exam:      Constitutional  · General Appearance: well nourished, well-developed, alert, oriented, in no acute distress  · Communication: ability, understanding, normal; no hoarseness  Head and Face  · Inspection: normocephalic, atraumatic, no scars, lesions or masses   · Palpation: no stepoffs, sinus tenderness or masses  · Parotid  glands: no masses, stones, swelling or tenderness  Eyes  · Ocular Motility / Alignment: normal alignment, motility, no proptosis, enophthalmus or nystagmus  · Conjunctiva: not injected  · Eyelids: no hooding, lag, entropion, or ectropion  Oral Cavity / Oropharynx  · Lips: upper and lower lips pink and moist  · Teeth: good dentition with few missing teeth mandible  · Gingiva: healthy  · Oral Mucosa: moist, no mucosal lesions  · Floor of Mouth: normal, no lesions, salivary ducts patent  · Tongue: moist, normal mobility, no lesions  · Palate: soft and hard palates without lesions or ulcers  Oropharynx: tonsils and walls without erythema, exudate, base of tongue soft to palpation  ·     PROCEDURES:   -------------------- LARYNGOSCOPY / NASOPHARYNGOSCOPY -------------------------     Pre-op DX: lesion right pyriform sinus  Post-op DX: same  Anesthesia: Topical Neosynephrine / Tetracaine  Indications: to look at lesion  Adverse Events: None        Procedure in Detail:     Flexible endoscopy with video was performed through the nasal passages. The nasal cavity, nasopharynx, oropharynx, hypopharynx and larynx were adequately visualized. The true vocal cords and arytenoids were examined during phonation and repose.        Operative Findings:     Nasal Cavity: Within normal limits  Nasopharynx: Within normal limits  Tongue Base: Within normal limits  Pharyngeal Walls: Within normal limits  Epiglottis / Aryepiglottic Folds: Within normal limits  Pyriform Sinus: smooth, round submucosal mass medial wall right pyriform sinus--unchanged  Vocal Cords: Within normal limits  Arytenoids: Within normal limits; mild interarytenoid edema        Assessment:   Submucosal lesion involving the right medial piriform sinus-this is benign in appearance; patient asymptomatic  Only way to definitively diagnose lesion is biopsy      Plan:  I recommended the patient undergo a direct laryngoscopy and biopsy of this lesion.    Risks, benefits and  alternatives were discussed with the patient including bleeding, scarring, infection, hematoma with airway obstruction, hoarseness pain with swallowing, bruised lips gums tongue cheeks and broken teeth  An alternative to this is observation and if lesion changes in size or character to undergo biopsy at that time.  Patient wishes to proceed with biopsy  Questions were answered and the consent was signed  OR 3/21/2019

## 2019-03-18 ENCOUNTER — HOSPITAL ENCOUNTER (OUTPATIENT)
Dept: RADIOLOGY | Facility: HOSPITAL | Age: 54
Discharge: HOME | End: 2019-03-18
Attending: OTOLARYNGOLOGY | Admitting: INTERNAL MEDICINE
Payer: COMMERCIAL

## 2019-03-18 PROCEDURE — 71046 XR CHEST PA AND LATERAL: ICD-10-PCS | Mod: 26,,, | Performed by: RADIOLOGY

## 2019-03-18 PROCEDURE — 71046 X-RAY EXAM CHEST 2 VIEWS: CPT | Mod: 26,,, | Performed by: RADIOLOGY

## 2019-03-18 PROCEDURE — 71046 X-RAY EXAM CHEST 2 VIEWS: CPT | Mod: TC,FY,PO

## 2019-03-21 PROBLEM — J38.7 LESION OF LARYNX: Status: ACTIVE | Noted: 2019-03-21

## 2019-03-21 RX ORDER — HYDROCODONE BITARTRATE AND ACETAMINOPHEN 7.5; 325 MG/1; MG/1
1 TABLET ORAL EVERY 8 HOURS PRN
Qty: 15 TABLET | Refills: 0 | Status: SHIPPED | OUTPATIENT
Start: 2019-03-21 | End: 2019-03-26

## 2019-03-25 ENCOUNTER — TELEPHONE (OUTPATIENT)
Dept: OTOLARYNGOLOGY | Facility: CLINIC | Age: 54
End: 2019-03-25

## 2019-03-26 ENCOUNTER — INITIAL CONSULT (OUTPATIENT)
Dept: PSYCHIATRY | Facility: CLINIC | Age: 54
End: 2019-03-26
Payer: COMMERCIAL

## 2019-03-26 ENCOUNTER — OFFICE VISIT (OUTPATIENT)
Dept: FAMILY MEDICINE | Facility: CLINIC | Age: 54
End: 2019-03-26
Payer: COMMERCIAL

## 2019-03-26 VITALS
BODY MASS INDEX: 34.01 KG/M2 | WEIGHT: 211.63 LBS | HEART RATE: 77 BPM | TEMPERATURE: 98 F | SYSTOLIC BLOOD PRESSURE: 143 MMHG | OXYGEN SATURATION: 95 % | HEIGHT: 66 IN | DIASTOLIC BLOOD PRESSURE: 90 MMHG

## 2019-03-26 DIAGNOSIS — F32.A ANXIETY AND DEPRESSION: ICD-10-CM

## 2019-03-26 DIAGNOSIS — I10 ESSENTIAL (PRIMARY) HYPERTENSION: Primary | ICD-10-CM

## 2019-03-26 DIAGNOSIS — Z86.59 HISTORY OF RECENT STRESSFUL LIFE EVENT: ICD-10-CM

## 2019-03-26 DIAGNOSIS — F41.9 ANXIETY AND DEPRESSION: ICD-10-CM

## 2019-03-26 DIAGNOSIS — F43.23 ADJUSTMENT DISORDER WITH MIXED ANXIETY AND DEPRESSED MOOD: Primary | ICD-10-CM

## 2019-03-26 PROCEDURE — 3077F PR MOST RECENT SYSTOLIC BLOOD PRESSURE >= 140 MM HG: ICD-10-PCS | Mod: CPTII,S$GLB,, | Performed by: PHYSICIAN ASSISTANT

## 2019-03-26 PROCEDURE — 99999 PR PBB SHADOW E&M-EST. PATIENT-LVL III: CPT | Mod: PBBFAC,,, | Performed by: PHYSICIAN ASSISTANT

## 2019-03-26 PROCEDURE — 99999 PR PBB SHADOW E&M-EST. PATIENT-LVL III: ICD-10-PCS | Mod: PBBFAC,,, | Performed by: PHYSICIAN ASSISTANT

## 2019-03-26 PROCEDURE — 3008F BODY MASS INDEX DOCD: CPT | Mod: CPTII,S$GLB,, | Performed by: PHYSICIAN ASSISTANT

## 2019-03-26 PROCEDURE — 3080F PR MOST RECENT DIASTOLIC BLOOD PRESSURE >= 90 MM HG: ICD-10-PCS | Mod: CPTII,S$GLB,, | Performed by: PHYSICIAN ASSISTANT

## 2019-03-26 PROCEDURE — 90791 PR PSYCHIATRIC DIAGNOSTIC EVALUATION: ICD-10-PCS | Mod: S$GLB,,, | Performed by: PSYCHOLOGIST

## 2019-03-26 PROCEDURE — 3008F PR BODY MASS INDEX (BMI) DOCUMENTED: ICD-10-PCS | Mod: CPTII,S$GLB,, | Performed by: PHYSICIAN ASSISTANT

## 2019-03-26 PROCEDURE — 90791 PSYCH DIAGNOSTIC EVALUATION: CPT | Mod: S$GLB,,, | Performed by: PSYCHOLOGIST

## 2019-03-26 PROCEDURE — 3077F SYST BP >= 140 MM HG: CPT | Mod: CPTII,S$GLB,, | Performed by: PHYSICIAN ASSISTANT

## 2019-03-26 PROCEDURE — 3080F DIAST BP >= 90 MM HG: CPT | Mod: CPTII,S$GLB,, | Performed by: PHYSICIAN ASSISTANT

## 2019-03-26 PROCEDURE — 99214 PR OFFICE/OUTPT VISIT, EST, LEVL IV, 30-39 MIN: ICD-10-PCS | Mod: S$GLB,,, | Performed by: PHYSICIAN ASSISTANT

## 2019-03-26 PROCEDURE — 99214 OFFICE O/P EST MOD 30 MIN: CPT | Mod: S$GLB,,, | Performed by: PHYSICIAN ASSISTANT

## 2019-03-26 RX ORDER — ESCITALOPRAM OXALATE 10 MG/1
10 TABLET ORAL DAILY
Qty: 30 TABLET | Refills: 1 | Status: SHIPPED | OUTPATIENT
Start: 2019-03-26 | End: 2019-04-09

## 2019-03-26 RX ORDER — HYDROCHLOROTHIAZIDE 25 MG/1
25 TABLET ORAL DAILY
Qty: 90 TABLET | Refills: 0 | Status: SHIPPED | OUTPATIENT
Start: 2019-03-26 | End: 2019-07-29 | Stop reason: SDUPTHER

## 2019-03-26 RX ORDER — FLUOXETINE HYDROCHLORIDE 20 MG/1
20 CAPSULE ORAL DAILY
Qty: 30 CAPSULE | Refills: 1 | Status: SHIPPED | OUTPATIENT
Start: 2019-03-26 | End: 2019-03-26

## 2019-03-26 RX ORDER — ESCITALOPRAM OXALATE 10 MG/1
10 TABLET ORAL DAILY
Qty: 30 TABLET | Refills: 11 | Status: SHIPPED | OUTPATIENT
Start: 2019-03-26 | End: 2019-03-26

## 2019-03-26 NOTE — PROGRESS NOTES
Primary Care Behavioral Health: Initial  CPT Code: 13461  Patient Name:  Eugenie Najera  Date:  03/26/2019  Site:  [facility]        Referral source:  Bella Nicole PA-C     Chief complaint/reason for encounter:  Adjustment Related Anxiety and Depression    History of present illness:  Ms. Eugenie Najera is a 53 year old  female referred by for symptoms of anxiety and depression. Patient was seen, examined and chart was reviewed.  Patient notes she has had several psychosocial stressors recently.  Patient notes she had a relationship dissolve approximately 1 month ago.  Patient notes she had a throat biopsy.  Patient notes her father had a CVA.      Past Medical History:   Diagnosis Date    Anemia     patient states has resilved    Hypertension     PONV (postoperative nausea and vomiting)     Thyroid disorder        Current Outpatient Medications:     acetaminophen (TYLENOL EXTRA STRENGTH) 500 MG tablet, Take 1,000 mg by mouth daily as needed. As directed on package, Disp: , Rfl:     escitalopram oxalate (LEXAPRO) 10 MG tablet, Take 1 tablet (10 mg total) by mouth once daily., Disp: 30 tablet, Rfl: 1    fluticasone (FLONASE) 50 mcg/actuation nasal spray, 1 spray (50 mcg total) by Each Nare route 2 (two) times daily., Disp: 1 Bottle, Rfl: 2    hydroCHLOROthiazide (HYDRODIURIL) 25 MG tablet, Take 1 tablet (25 mg total) by mouth once daily., Disp: 90 tablet, Rfl: 0    HYDROcodone-acetaminophen (NORCO) 7.5-325 mg per tablet, Take 1 tablet by mouth every 8 (eight) hours as needed for Pain., Disp: 15 tablet, Rfl: 0    Psychosocial history:    Psychiatric history:  Previous diagnosis: Patient denies hx of psychological diagnosis.  Psychiatric medications: Lexapro  Previous hospitalizations: Patient denied.  History of outpatient treatment: Patient denied.  Previous suicide attempt:  Patient denied.    Current and past substance use:  Alcohol:  Denied current use.  Denied history of abuse  or dependency.  Drugs:  Denied current use.  Denied history of abuse or dependency.  Tobacco:  Denied current use.  Caffeine:  Denied current use.    Psychiatric symptoms:  Depression: Patient endorses symptoms of sadness, tearfulness, decreased energy.  Marli/Hypomania:  Denied.  She denied periods of elevated mood or abnormally increased energy or goal-directed activity.  Anxiety:  Patient endorses symptoms of nervousness, restlessness and feeling overwhelmed.  Thoughts:  Denied delusions, hallucinations.  Suicidal thoughts/behaviors: Patient denied suicidal and homicidal ideation, plan and intent.  Patient noted agreement to call 911/and or present to the ED if she experienced suicidal or homicidal ideation, plan or intent.    Self-injury:  Denied.  Sleep:  Patient notes she has been waking more frequently throughout the last month.    Mental Status Exam:  General appearance:  appears stated age, neatly dressed, well groomed  Speech:  Clear and intelligible, normal rate, normal tone, normal pitch, normal volume  Level of cooperation:  cooperative  Thought processes:  Linear, logical, goal-directed  Mood:  Depressed and anxious  Thought content:  Relevant and appropriate, no illusions, no visual hallucinations, no auditory hallucinations, no delusions, no active or passive homicidal thoughts, no active or passive suicidal ideation, no obsessions, no compulsions, no violence  Affect: Congruent to mood; tearfulness, nervousness and restlessness noted at times  Orientation:  oriented to person, place, situation and date  Memory/Attention/Concentration:  No gross cognitive deficits made evident during conversation.  Judgment and insight: fair              Language:  intact    PHQ9 3/26/2019   Total Score 21     GAD7 3/26/2019   XIANG-7 Score 19       Impression: At this time, patient's symptoms of anxiety and depression appear to be adjustment related; patient with recent series of several psychosocial stressors.   Patient provided psychoeducation on anxiety.  Distraction techniques and diaphragmatic breathing were discussed.  Patient was offered follow-up appointment however was somewhat reluctant and notes she would like to call back to schedule.    Length of Session:  30 minutes

## 2019-03-26 NOTE — PROGRESS NOTES
"Subjective:      Patient ID: Eugenie Najera is a 53 y.o. female.    Chief Complaint: Medication Refill (requesting depression medications as well)    Patient is new to me, here today for depression    Dad is ill, recent health problems (biopsy of throat), stressful job, break up  Has been ~mth since all this started  Has tried exercising more, watching diet but mood is causing her to overeat    Review of Systems   Constitutional: Negative for chills, diaphoresis and fever.   Respiratory: Negative for cough, shortness of breath and wheezing.    Cardiovascular: Negative for chest pain and palpitations.   Gastrointestinal: Negative for abdominal pain, constipation, diarrhea, nausea and vomiting.   Skin: Negative for rash.   Neurological: Negative for dizziness, light-headedness and headaches.   Psychiatric/Behavioral: Positive for decreased concentration (decreased focus), dysphoric mood (depressed) and sleep disturbance (trouble staying asleep). Negative for self-injury and suicidal ideas. The patient is nervous/anxious.         Denies history of anxiety/depression in the past  19yrs ago briefly took medication following divorce       Objective:   BP (!) 148/90 (BP Location: Left arm, Patient Position: Sitting)   Pulse 77   Temp 97.6 °F (36.4 °C) (Oral)   Ht 5' 6" (1.676 m)   Wt 96 kg (211 lb 10.3 oz)   LMP 08/09/2015   SpO2 95%   BMI 34.16 kg/m²   Physical Exam   Constitutional: She appears well-developed and well-nourished. She does not appear ill. No distress.   HENT:   Head: Normocephalic and atraumatic.   Cardiovascular: Normal rate, regular rhythm and normal heart sounds.   No murmur heard.  Pulmonary/Chest: Effort normal and breath sounds normal. No respiratory distress. She has no decreased breath sounds.   Abdominal: Soft. Normal appearance and bowel sounds are normal. There is no tenderness.   Skin: Skin is warm, dry and intact. No rash noted.   Psychiatric: Her speech is normal and behavior is " normal. Judgment and thought content normal. Cognition and memory are normal. She exhibits a depressed mood.   Patient tearful on exam     Assessment:      1. Essential (primary) hypertension    2. Anxiety and depression    3. History of recent stressful life event       Plan:   Essential (primary) hypertension  -     hydroCHLOROthiazide (HYDRODIURIL) 25 MG tablet; Take 1 tablet (25 mg total) by mouth once daily.  Dispense: 90 tablet; Refill: 0    Anxiety and depression  -     escitalopram oxalate (LEXAPRO) 10 MG tablet; Take 1 tablet (10 mg total) by mouth once daily.  Dispense: 30 tablet; Refill: 1    History of recent stressful life event  -     Ambulatory referral to Psychology    1mth follow up w me  Also recommend psychiatry at least temporarily d/t increased life stressors  Monitor BP at home, if it remains elevated, return to clinic    Discussed worsening signs/symptoms and when to return to clinic or go to ED.   Patient expresses understanding and agrees with treatment plan.

## 2019-04-08 ENCOUNTER — OFFICE VISIT (OUTPATIENT)
Dept: FAMILY MEDICINE | Facility: CLINIC | Age: 54
End: 2019-04-08
Payer: COMMERCIAL

## 2019-04-08 ENCOUNTER — PATIENT MESSAGE (OUTPATIENT)
Dept: FAMILY MEDICINE | Facility: CLINIC | Age: 54
End: 2019-04-08

## 2019-04-08 VITALS
HEIGHT: 66 IN | SYSTOLIC BLOOD PRESSURE: 140 MMHG | TEMPERATURE: 98 F | DIASTOLIC BLOOD PRESSURE: 90 MMHG | HEART RATE: 71 BPM | OXYGEN SATURATION: 97 % | BODY MASS INDEX: 33.59 KG/M2 | WEIGHT: 209 LBS

## 2019-04-08 DIAGNOSIS — H60.502 ACUTE OTITIS EXTERNA OF LEFT EAR, UNSPECIFIED TYPE: ICD-10-CM

## 2019-04-08 DIAGNOSIS — F32.A ANXIETY AND DEPRESSION: Primary | ICD-10-CM

## 2019-04-08 DIAGNOSIS — F41.9 ANXIETY AND DEPRESSION: Primary | ICD-10-CM

## 2019-04-08 PROCEDURE — 3008F BODY MASS INDEX DOCD: CPT | Mod: CPTII,S$GLB,, | Performed by: PHYSICIAN ASSISTANT

## 2019-04-08 PROCEDURE — 99214 PR OFFICE/OUTPT VISIT, EST, LEVL IV, 30-39 MIN: ICD-10-PCS | Mod: S$GLB,,, | Performed by: PHYSICIAN ASSISTANT

## 2019-04-08 PROCEDURE — 99999 PR PBB SHADOW E&M-EST. PATIENT-LVL III: CPT | Mod: PBBFAC,,, | Performed by: PHYSICIAN ASSISTANT

## 2019-04-08 PROCEDURE — 3008F PR BODY MASS INDEX (BMI) DOCUMENTED: ICD-10-PCS | Mod: CPTII,S$GLB,, | Performed by: PHYSICIAN ASSISTANT

## 2019-04-08 PROCEDURE — 3077F SYST BP >= 140 MM HG: CPT | Mod: CPTII,S$GLB,, | Performed by: PHYSICIAN ASSISTANT

## 2019-04-08 PROCEDURE — 3080F DIAST BP >= 90 MM HG: CPT | Mod: CPTII,S$GLB,, | Performed by: PHYSICIAN ASSISTANT

## 2019-04-08 PROCEDURE — 3077F PR MOST RECENT SYSTOLIC BLOOD PRESSURE >= 140 MM HG: ICD-10-PCS | Mod: CPTII,S$GLB,, | Performed by: PHYSICIAN ASSISTANT

## 2019-04-08 PROCEDURE — 99214 OFFICE O/P EST MOD 30 MIN: CPT | Mod: S$GLB,,, | Performed by: PHYSICIAN ASSISTANT

## 2019-04-08 PROCEDURE — 3080F PR MOST RECENT DIASTOLIC BLOOD PRESSURE >= 90 MM HG: ICD-10-PCS | Mod: CPTII,S$GLB,, | Performed by: PHYSICIAN ASSISTANT

## 2019-04-08 PROCEDURE — 99999 PR PBB SHADOW E&M-EST. PATIENT-LVL III: ICD-10-PCS | Mod: PBBFAC,,, | Performed by: PHYSICIAN ASSISTANT

## 2019-04-08 RX ORDER — HYDROXYZINE HYDROCHLORIDE 25 MG/1
25 TABLET, FILM COATED ORAL NIGHTLY PRN
Qty: 10 TABLET | Refills: 0 | Status: SHIPPED | OUTPATIENT
Start: 2019-04-08 | End: 2019-04-09 | Stop reason: SDUPTHER

## 2019-04-08 RX ORDER — OFLOXACIN 3 MG/ML
5 SOLUTION AURICULAR (OTIC) 2 TIMES DAILY
Qty: 10 ML | Refills: 0 | Status: SHIPPED | OUTPATIENT
Start: 2019-04-08 | End: 2019-04-15

## 2019-04-08 NOTE — PROGRESS NOTES
"Subjective:      Patient ID: Eugenie Najera is a 53 y.o. female.    Chief Complaint: Anxiety (anxiety has become worse, hand tremors, unfocused, dizziness, insomnia,hard to get out of bed)    Patient is known to me, here today for follow up for anxiety  Was started on Lexapro on 3/24, also saw psychiatry (Dr. Johnson) for one session but did not feel those techniques would be helpful for her  Patient states she feels the medication is making her anxiety worse; she states she has had nausea and feels off balanced, she is having trouble sleeping    Patient also complains about L ear pain since flying last week    Anxiety   Presents for follow-up visit. Symptoms include dizziness, nausea and nervous/anxious behavior. Patient reports no chest pain, shortness of breath or suicidal ideas. Symptoms occur constantly.     Side effects of treatment include GI discomfort.     Review of Systems   Constitutional: Negative for chills, diaphoresis, fatigue and fever.   HENT: Positive for ear pain (L ear pressure, recently was on a flight). Negative for congestion and rhinorrhea.    Respiratory: Negative for cough, shortness of breath and wheezing.    Cardiovascular: Negative for chest pain.   Gastrointestinal: Positive for nausea and vomiting (3x). Negative for abdominal pain, constipation and diarrhea.   Skin: Negative for rash.   Neurological: Positive for dizziness and light-headedness. Negative for numbness and headaches.   Psychiatric/Behavioral: Positive for sleep disturbance. Negative for dysphoric mood (depressed), self-injury and suicidal ideas. The patient is nervous/anxious. The patient is not hyperactive.        Objective:   BP (!) 140/90 (BP Location: Left arm, Patient Position: Sitting)   Pulse 71   Temp 97.6 °F (36.4 °C)   Ht 5' 6" (1.676 m)   Wt 94.8 kg (208 lb 15.9 oz)   LMP 08/09/2015   SpO2 97%   BMI 33.73 kg/m²   Physical Exam   Constitutional: She appears well-developed and well-nourished. She does " not appear ill. No distress.   HENT:   Head: Normocephalic and atraumatic.   Right Ear: Hearing, tympanic membrane, external ear and ear canal normal.   Left Ear: There is drainage (purulent).   L ear canal erythematous   Cardiovascular: Normal rate, regular rhythm and normal heart sounds.   No murmur heard.  Pulmonary/Chest: Effort normal and breath sounds normal. No respiratory distress. She has no decreased breath sounds.   Skin: Skin is warm, dry and intact. No rash noted.   Psychiatric: Her speech is normal and behavior is normal. Thought content normal. Her mood appears anxious.     Assessment:      1. Anxiety and depression    2. Acute otitis externa of left ear, unspecified type       Plan:   Anxiety and depression  -     hydrOXYzine HCl (ATARAX) 25 MG tablet; Take 1 tablet (25 mg total) by mouth nightly as needed.  Dispense: 10 tablet; Refill: 0  -     Ambulatory referral to Psychiatry    Acute otitis externa of left ear, unspecified type  -     ofloxacin (FLOXIN) 0.3 % otic solution; Place 5 drops into the left ear 2 (two) times daily. for 7 days  Dispense: 10 mL; Refill: 0    Cut down to .5 tablet of Lexapro (5mg) and add Vistaril  Patient given call # for psychiatry    Discussed worsening signs/symptoms and when to return to clinic or go to ED.   Patient expresses understanding and agrees with treatment plan.

## 2019-04-09 ENCOUNTER — OFFICE VISIT (OUTPATIENT)
Dept: FAMILY MEDICINE | Facility: CLINIC | Age: 54
End: 2019-04-09
Payer: COMMERCIAL

## 2019-04-09 ENCOUNTER — PATIENT OUTREACH (OUTPATIENT)
Dept: ADMINISTRATIVE | Facility: HOSPITAL | Age: 54
End: 2019-04-09

## 2019-04-09 VITALS
OXYGEN SATURATION: 98 % | SYSTOLIC BLOOD PRESSURE: 142 MMHG | HEART RATE: 72 BPM | BODY MASS INDEX: 33.7 KG/M2 | DIASTOLIC BLOOD PRESSURE: 90 MMHG | WEIGHT: 209.69 LBS | HEIGHT: 66 IN

## 2019-04-09 DIAGNOSIS — F41.9 ANXIETY AND DEPRESSION: ICD-10-CM

## 2019-04-09 DIAGNOSIS — F32.A ANXIETY AND DEPRESSION: ICD-10-CM

## 2019-04-09 PROCEDURE — 99999 PR PBB SHADOW E&M-EST. PATIENT-LVL III: CPT | Mod: PBBFAC,,, | Performed by: INTERNAL MEDICINE

## 2019-04-09 PROCEDURE — 99999 PR PBB SHADOW E&M-EST. PATIENT-LVL III: ICD-10-PCS | Mod: PBBFAC,,, | Performed by: INTERNAL MEDICINE

## 2019-04-09 PROCEDURE — 99214 OFFICE O/P EST MOD 30 MIN: CPT | Mod: S$GLB,,, | Performed by: INTERNAL MEDICINE

## 2019-04-09 PROCEDURE — 3080F PR MOST RECENT DIASTOLIC BLOOD PRESSURE >= 90 MM HG: ICD-10-PCS | Mod: CPTII,S$GLB,, | Performed by: INTERNAL MEDICINE

## 2019-04-09 PROCEDURE — 3077F PR MOST RECENT SYSTOLIC BLOOD PRESSURE >= 140 MM HG: ICD-10-PCS | Mod: CPTII,S$GLB,, | Performed by: INTERNAL MEDICINE

## 2019-04-09 PROCEDURE — 99214 PR OFFICE/OUTPT VISIT, EST, LEVL IV, 30-39 MIN: ICD-10-PCS | Mod: S$GLB,,, | Performed by: INTERNAL MEDICINE

## 2019-04-09 PROCEDURE — 3008F PR BODY MASS INDEX (BMI) DOCUMENTED: ICD-10-PCS | Mod: CPTII,S$GLB,, | Performed by: INTERNAL MEDICINE

## 2019-04-09 PROCEDURE — 3077F SYST BP >= 140 MM HG: CPT | Mod: CPTII,S$GLB,, | Performed by: INTERNAL MEDICINE

## 2019-04-09 PROCEDURE — 3080F DIAST BP >= 90 MM HG: CPT | Mod: CPTII,S$GLB,, | Performed by: INTERNAL MEDICINE

## 2019-04-09 PROCEDURE — 3008F BODY MASS INDEX DOCD: CPT | Mod: CPTII,S$GLB,, | Performed by: INTERNAL MEDICINE

## 2019-04-09 RX ORDER — HYDROXYZINE HYDROCHLORIDE 25 MG/1
25 TABLET, FILM COATED ORAL NIGHTLY PRN
Qty: 30 TABLET | Refills: 1 | Status: SHIPPED | OUTPATIENT
Start: 2019-04-09 | End: 2019-04-19

## 2019-04-09 RX ORDER — BUSPIRONE HYDROCHLORIDE 5 MG/1
5 TABLET ORAL 2 TIMES DAILY
Qty: 60 TABLET | Refills: 1 | Status: SHIPPED | OUTPATIENT
Start: 2019-04-09 | End: 2019-04-23

## 2019-04-09 RX ORDER — ALPRAZOLAM 0.25 MG/1
0.25 TABLET ORAL DAILY PRN
Qty: 10 TABLET | Refills: 0 | Status: SHIPPED | OUTPATIENT
Start: 2019-04-09 | End: 2019-04-23 | Stop reason: SDUPTHER

## 2019-04-09 NOTE — PROGRESS NOTES
"Subjective:       Patient ID: Eugenie Naejra is a 53 y.o. female.    Chief Complaint: Anxiety    HPI   PMH: HTN, GERD  From [place].    Anxiety:   "loosing it" Thought it was depression. 2 mo ago broke up with BF. Father had multiple health problems in another state. Also having money issues.  in 2013. She has adult daughter in [place]. Has 19 yo son and 28 yo daughter. Son is in [place] in college.  She has good friends.   Started the lexapro 10 mg 2 weeks ago. She feels like depression is better, but anxiety seems to be worse. Has attempted to get in with psyc but nearest appointment was July 16th with PA.  Denies suicidality and homicidality.  CMP, CBC relatively normal in past few months   XIANG 7: 18  PHQ9: 20   -will DC lexapro for now  -will start buspirone 5 mg BID  -will have 0.25 mg alprazolam prn severe anxiety  -continue hydroxyzine at night for sleep.   -Will follow up in 2 weeks to readdress and potential start  another SSRI.   -she will schedule with counselor.   -check TSH today    HTN:   Likely elevated in presence of severe anxiety  -continue HCTZ 25 mg qd.         Current Outpatient Medications on File Prior to Visit   Medication Sig Dispense Refill    fluticasone (FLONASE) 50 mcg/actuation nasal spray 1 spray (50 mcg total) by Each Nare route 2 (two) times daily. 1 Bottle 2    hydroCHLOROthiazide (HYDRODIURIL) 25 MG tablet Take 1 tablet (25 mg total) by mouth once daily. 90 tablet 0    ofloxacin (FLOXIN) 0.3 % otic solution Place 5 drops into the left ear 2 (two) times daily. for 7 days 10 mL 0    [DISCONTINUED] escitalopram oxalate (LEXAPRO) 10 MG tablet Take 1 tablet (10 mg total) by mouth once daily. 30 tablet 1    [DISCONTINUED] hydrOXYzine HCl (ATARAX) 25 MG tablet Take 1 tablet (25 mg total) by mouth nightly as needed. 10 tablet 0    acetaminophen (TYLENOL EXTRA STRENGTH) 500 MG tablet Take 1,000 mg by mouth daily as needed. As directed on package       No current " facility-administered medications on file prior to visit.      I personally reviewed past medical, family and social history.  Review of Systems   Constitutional: Negative for activity change, fever and unexpected weight change.   HENT: Negative for facial swelling, hearing loss and trouble swallowing.    Eyes: Negative for visual disturbance.   Respiratory: Negative for cough, chest tightness, shortness of breath and wheezing.    Cardiovascular: Negative for chest pain, palpitations and leg swelling.   Gastrointestinal: Negative for abdominal pain, blood in stool, constipation, diarrhea, nausea and vomiting.   Endocrine: Negative for cold intolerance, polydipsia, polyphagia and polyuria.   Genitourinary: Negative for decreased urine volume and dysuria.   Musculoskeletal: Negative for gait problem and neck pain.   Skin: Negative for rash.   Neurological: Negative for dizziness, syncope and light-headedness.   Psychiatric/Behavioral: Negative for dysphoric mood. The patient is not nervous/anxious.        Objective:     Vitals:    04/09/19 1135   BP: (!) 142/90   Pulse:         Physical Exam   Constitutional: She is oriented to person, place, and time. She appears well-developed and well-nourished. No distress.   HENT:   Head: Normocephalic and atraumatic.   Eyes: Pupils are equal, round, and reactive to light. EOM are normal. Right eye exhibits no discharge. Left eye exhibits no discharge. No scleral icterus.   Neck: Normal range of motion. Neck supple. No JVD present. No thyromegaly present.   Cardiovascular: Normal rate, regular rhythm and normal heart sounds. Exam reveals no gallop and no friction rub.   No murmur heard.  Pulmonary/Chest: Effort normal and breath sounds normal. No respiratory distress. She has no wheezes.   Abdominal: Soft. Bowel sounds are normal. She exhibits no distension and no mass. There is no tenderness.   Musculoskeletal: Normal range of motion. She exhibits no edema.   Lymphadenopathy:      She has no cervical adenopathy.   Neurological: She is alert and oriented to person, place, and time. No cranial nerve deficit. Coordination normal.   Skin: Skin is warm and dry. Capillary refill takes less than 2 seconds. No rash noted. She is not diaphoretic.   Psychiatric: She has a normal mood and affect. Her behavior is normal.       Assessment/Plan   Eugenie was seen today for anxiety.    Diagnoses and all orders for this visit:    Anxiety and depression  -     hydrOXYzine HCl (ATARAX) 25 MG tablet; Take 1 tablet (25 mg total) by mouth nightly as needed.  -     busPIRone (BUSPAR) 5 MG Tab; Take 1 tablet (5 mg total) by mouth 2 (two) times daily.  -     ALPRAZolam (XANAX) 0.25 MG tablet; Take 1 tablet (0.25 mg total) by mouth daily as needed for Anxiety.  -     TSH; Future

## 2019-04-09 NOTE — LETTER
April 17, 2019    Eugenie Najera  [address] [address]             [facility]  [address]  [facility] [address]  Phone: [phone] Dear Mrs. Najera:    We have tried to reach you by mychart unsuccessfully.      [facility] is committed to your overall health.  To help you get the most out of each of your visits, we will review your information to make sure you are up to date on all of your recommended tests and/or procedures.       Ramon Mcleod MD   has found that your chart shows you may be due for the following:     Pneumococcal Vaccine   Colonoscopy   Mammogram   Influenza Vaccine     If you have had any of the above done at another facility, please bring the records with you or fax them to [phone] so that your record at [facility] will be complete. If you have not had any of these tests or procedures done recently and would like to complete this testing ,  please call [phone] or send a message through your [facility] portal to your provider's office.     If you have an upcoming scheduled appointment for the above test and/or procedures, please disregard this letter.     If you are currently taking medication, please bring it with you to your appointment for review.     Sincerely,     Your [facility],   MD Dalia Carr   Clinical Care Coordinator   [facility]      If you have any questions or concerns, please don't hesitate to call.

## 2019-04-10 ENCOUNTER — LAB VISIT (OUTPATIENT)
Dept: LAB | Facility: HOSPITAL | Age: 54
End: 2019-04-10
Attending: INTERNAL MEDICINE
Payer: COMMERCIAL

## 2019-04-10 DIAGNOSIS — F41.9 ANXIETY AND DEPRESSION: ICD-10-CM

## 2019-04-10 DIAGNOSIS — F32.A ANXIETY AND DEPRESSION: ICD-10-CM

## 2019-04-10 LAB — TSH SERPL DL<=0.005 MIU/L-ACNC: 1.22 UIU/ML (ref 0.4–4)

## 2019-04-10 PROCEDURE — 84443 ASSAY THYROID STIM HORMONE: CPT

## 2019-04-10 PROCEDURE — 36415 COLL VENOUS BLD VENIPUNCTURE: CPT | Mod: PO

## 2019-04-11 ENCOUNTER — PATIENT MESSAGE (OUTPATIENT)
Dept: FAMILY MEDICINE | Facility: CLINIC | Age: 54
End: 2019-04-11

## 2019-04-12 ENCOUNTER — PATIENT OUTREACH (OUTPATIENT)
Dept: ADMINISTRATIVE | Facility: HOSPITAL | Age: 54
End: 2019-04-12

## 2019-04-16 ENCOUNTER — OFFICE VISIT (OUTPATIENT)
Dept: OTOLARYNGOLOGY | Facility: CLINIC | Age: 54
End: 2019-04-16
Payer: COMMERCIAL

## 2019-04-16 VITALS
SYSTOLIC BLOOD PRESSURE: 131 MMHG | RESPIRATION RATE: 16 BRPM | WEIGHT: 202.81 LBS | BODY MASS INDEX: 31.83 KG/M2 | DIASTOLIC BLOOD PRESSURE: 83 MMHG | HEIGHT: 67 IN | HEART RATE: 81 BPM

## 2019-04-16 DIAGNOSIS — Z87.891 HISTORY OF TOBACCO USE: ICD-10-CM

## 2019-04-16 DIAGNOSIS — J39.2 LESION OF HYPOPHARYNX: Primary | ICD-10-CM

## 2019-04-16 DIAGNOSIS — H61.22 HEARING LOSS OF LEFT EAR DUE TO CERUMEN IMPACTION: ICD-10-CM

## 2019-04-16 PROCEDURE — 99999 PR PBB SHADOW E&M-EST. PATIENT-LVL III: ICD-10-PCS | Mod: PBBFAC,,, | Performed by: OTOLARYNGOLOGY

## 2019-04-16 PROCEDURE — 69210 REMOVE IMPACTED EAR WAX UNI: CPT | Mod: 51,S$GLB,, | Performed by: OTOLARYNGOLOGY

## 2019-04-16 PROCEDURE — 99999 PR PBB SHADOW E&M-EST. PATIENT-LVL III: CPT | Mod: PBBFAC,,, | Performed by: OTOLARYNGOLOGY

## 2019-04-16 PROCEDURE — 99024 PR POST-OP FOLLOW-UP VISIT: ICD-10-PCS | Mod: S$GLB,,, | Performed by: OTOLARYNGOLOGY

## 2019-04-16 PROCEDURE — 99024 POSTOP FOLLOW-UP VISIT: CPT | Mod: S$GLB,,, | Performed by: OTOLARYNGOLOGY

## 2019-04-16 PROCEDURE — 31575 DIAGNOSTIC LARYNGOSCOPY: CPT | Mod: S$GLB,,, | Performed by: OTOLARYNGOLOGY

## 2019-04-16 PROCEDURE — 31575 PR LARYNGOSCOPY, FLEXIBLE; DIAGNOSTIC: ICD-10-PCS | Mod: S$GLB,,, | Performed by: OTOLARYNGOLOGY

## 2019-04-16 PROCEDURE — 69210 PR REMOVAL IMPACTED CERUMEN REQUIRING INSTRUMENTATION, UNILATERAL: ICD-10-PCS | Mod: 51,S$GLB,, | Performed by: OTOLARYNGOLOGY

## 2019-04-16 NOTE — PROGRESS NOTES
Date of Encounter:   Provider: Kia Horowitz MD  Referring MD:  MD Oli  PCP:    CC:  Patient was referred by Dr. Baird for evaluation of a lesion involving the right piriform sinus incidentally seen on EGD    HPI:    Patient is a 53-year-old female with a history of morbid obesity who is status post a lap banding with significant GERD following surgery. She was seen by Dr. Baird who performed an EGD.  She was noted at that time to have a culture positive for H pylori and a well-circumscribed lesion involving the right piriform sinus.  Patient is on antibiotics for H pylori and she was referred to me for evaluation of this lesion    Patient is a nonsmoker/never smoker.  She denies dysphagia, odynophagia, hemoptysis, otalgia and weight loss.  Essentially she is asymptomatic    2/19/2019   Patient is here today for re-evaluation and possible preop.  She has no complaints    4/16/2019  Patient is here for postop visit.  She status post direct laryngoscopy and biopsy of a mass medial wall of right piriform sinus.  No complaints except for clogged left ear. Saw NP last week who tried to irrigate ear but feeling of fullness was worse.      ROS: see HPI also  Constitutional: Negative for activity change and appetite change, weight loss.   Eyes: Negative for discharge, visual changes.   Respiratory: Negative for difficulty breathing and wheezing   Cardiovascular: Negative for chest pain.   Gastrointestinal: + GERD; + H pylori --treated  Endocrine: Negative for cold intolerance and heat intolerance.   Genitourinary: Negative for dysuria.   Musculoskeletal: Negative for gait problem, muscle pain and joint swelling.   Skin: Negative for color change and pallor; negative for skin lesions.   Neurological: Negative for syncope and weakness; no numbness face.   Psychiatric/Behavioral: Negative for agitation and confusion; negative for depression.    Physical Exam:      Constitutional  · General Appearance: well nourished,  well-developed, alert, oriented, in no acute distress  Communication: ability, understanding, normal; no hoarseness  Ears: right EAC and TM normal; left EAC with wet cerumen--suctioned with microscope; EAC slightly red; no infection; TM intact  Oral Cavity / Oropharynx  · Lips: upper and lower lips pink and moist  · Teeth: good dentition with few missing teeth mandible  · Gingiva: healthy  · Oral Mucosa: moist, no mucosal lesions  · Floor of Mouth: normal, no lesions, salivary ducts patent  · Tongue: moist, normal mobility, no lesions  · Palate: soft and hard palates without lesions or ulcers  Oropharynx: tonsils and walls without erythema, exudate, base of tongue soft to palpation  ·     PROCEDURES:   -------------------- LARYNGOSCOPY / NASOPHARYNGOSCOPY -------------------------     Pre-op DX: lesion right pyriform sinus  Post-op DX: same  Anesthesia: Topical Neosynephrine / Tetracaine  Indications: to look at lesion  Adverse Events: None        Procedure in Detail:     Flexible endoscopy with video was performed through the nasal passages. The nasal cavity, nasopharynx, oropharynx, hypopharynx and larynx were adequately visualized. The true vocal cords and arytenoids were examined during phonation and repose.        Operative Findings:     Nasal Cavity: Within normal limits  Nasopharynx: Within normal limits  Tongue Base: Within normal limits  Pharyngeal Walls: Within normal limits  Epiglottis / Aryepiglottic Folds: Within normal limits  Pyriform Sinus: no lesions seen  Vocal Cords: Within normal limits  Arytenoids: Within normal limits; mild interarytenoid edema     PATH: 3/21/19   HYPOPHARYNX, MEDIAL WALL RIGHT PYRIFORM SINUS, BIOPSY:  - SQUAMOUS MUCOSA OVERLYING DENSE FIBROUS TISSUE AND LYMPHOID   HYPERPLASIA.     Assessment:   Submucosal lesion involving the right medial piriform sinus-S/P biopsy--benign  Cerumen impaction left ear      Plan:  F/U PRN  Cerumen removed using microscope and suction

## 2019-04-18 PROBLEM — H61.22 HEARING LOSS OF LEFT EAR DUE TO CERUMEN IMPACTION: Status: ACTIVE | Noted: 2019-04-18

## 2019-04-18 PROBLEM — Z87.891 HISTORY OF TOBACCO USE: Status: ACTIVE | Noted: 2019-04-18

## 2019-04-22 DIAGNOSIS — F32.A ANXIETY AND DEPRESSION: ICD-10-CM

## 2019-04-22 DIAGNOSIS — F41.9 ANXIETY AND DEPRESSION: ICD-10-CM

## 2019-04-22 RX ORDER — ALPRAZOLAM 0.25 MG/1
0.25 TABLET ORAL DAILY PRN
Qty: 10 TABLET | Refills: 0 | OUTPATIENT
Start: 2019-04-22

## 2019-04-23 ENCOUNTER — OFFICE VISIT (OUTPATIENT)
Dept: FAMILY MEDICINE | Facility: CLINIC | Age: 54
End: 2019-04-23
Payer: COMMERCIAL

## 2019-04-23 VITALS
BODY MASS INDEX: 33.32 KG/M2 | DIASTOLIC BLOOD PRESSURE: 98 MMHG | SYSTOLIC BLOOD PRESSURE: 142 MMHG | WEIGHT: 212.31 LBS | OXYGEN SATURATION: 99 % | HEIGHT: 67 IN | HEART RATE: 70 BPM

## 2019-04-23 DIAGNOSIS — F32.A ANXIETY AND DEPRESSION: Primary | ICD-10-CM

## 2019-04-23 DIAGNOSIS — F41.9 ANXIETY AND DEPRESSION: Primary | ICD-10-CM

## 2019-04-23 DIAGNOSIS — Z12.11 SCREENING FOR COLON CANCER: ICD-10-CM

## 2019-04-23 DIAGNOSIS — Z12.39 SCREENING FOR BREAST CANCER: ICD-10-CM

## 2019-04-23 PROCEDURE — 3008F PR BODY MASS INDEX (BMI) DOCUMENTED: ICD-10-PCS | Mod: CPTII,S$GLB,, | Performed by: INTERNAL MEDICINE

## 2019-04-23 PROCEDURE — 99999 PR PBB SHADOW E&M-EST. PATIENT-LVL III: CPT | Mod: PBBFAC,,, | Performed by: INTERNAL MEDICINE

## 2019-04-23 PROCEDURE — 3077F PR MOST RECENT SYSTOLIC BLOOD PRESSURE >= 140 MM HG: ICD-10-PCS | Mod: CPTII,S$GLB,, | Performed by: INTERNAL MEDICINE

## 2019-04-23 PROCEDURE — 99214 OFFICE O/P EST MOD 30 MIN: CPT | Mod: S$GLB,,, | Performed by: INTERNAL MEDICINE

## 2019-04-23 PROCEDURE — 3008F BODY MASS INDEX DOCD: CPT | Mod: CPTII,S$GLB,, | Performed by: INTERNAL MEDICINE

## 2019-04-23 PROCEDURE — 3080F DIAST BP >= 90 MM HG: CPT | Mod: CPTII,S$GLB,, | Performed by: INTERNAL MEDICINE

## 2019-04-23 PROCEDURE — 99214 PR OFFICE/OUTPT VISIT, EST, LEVL IV, 30-39 MIN: ICD-10-PCS | Mod: S$GLB,,, | Performed by: INTERNAL MEDICINE

## 2019-04-23 PROCEDURE — 99999 PR PBB SHADOW E&M-EST. PATIENT-LVL III: ICD-10-PCS | Mod: PBBFAC,,, | Performed by: INTERNAL MEDICINE

## 2019-04-23 PROCEDURE — 3080F PR MOST RECENT DIASTOLIC BLOOD PRESSURE >= 90 MM HG: ICD-10-PCS | Mod: CPTII,S$GLB,, | Performed by: INTERNAL MEDICINE

## 2019-04-23 PROCEDURE — 3077F SYST BP >= 140 MM HG: CPT | Mod: CPTII,S$GLB,, | Performed by: INTERNAL MEDICINE

## 2019-04-23 RX ORDER — ALPRAZOLAM 0.25 MG/1
0.25 TABLET ORAL NIGHTLY PRN
Qty: 20 TABLET | Refills: 0 | Status: SHIPPED | OUTPATIENT
Start: 2019-04-23 | End: 2019-07-22

## 2019-04-23 RX ORDER — HYDROXYZINE HYDROCHLORIDE 25 MG/1
25 TABLET, FILM COATED ORAL NIGHTLY
Qty: 30 TABLET | Refills: 2 | Status: SHIPPED | OUTPATIENT
Start: 2019-04-23 | End: 2019-09-06 | Stop reason: SDUPTHER

## 2019-04-23 RX ORDER — SERTRALINE HYDROCHLORIDE 25 MG/1
TABLET, FILM COATED ORAL
Qty: 53 TABLET | Refills: 0 | Status: SHIPPED | OUTPATIENT
Start: 2019-04-23 | End: 2019-05-21 | Stop reason: SDUPTHER

## 2019-04-23 RX ORDER — BUSPIRONE HYDROCHLORIDE 10 MG/1
10 TABLET ORAL 2 TIMES DAILY
Qty: 60 TABLET | Refills: 1 | Status: SHIPPED | OUTPATIENT
Start: 2019-04-23 | End: 2019-05-21 | Stop reason: SDUPTHER

## 2019-04-23 NOTE — PROGRESS NOTES
Subjective:       Patient ID: Eugenie Najera is a 53 y.o. female.    Chief Complaint: Anxiety ( patient wants to establish care with you from Dr. Mcleod) and Depression (follow up)    HPI     PMH: HTN, GERD  From [place].     XIANG and moderated major depression:  Saw her initially on 04/09/2019.  Triggers include break-up with boyfriend 2 months ago, father in different state with poor health, money issues.  Patient  in 2013  Past treatments:  Lexapro stopped 2/2 to making anxiety  worse.   On last visit we started buspirone 5 mg b.i.d. with alprazolam 0.25 mg for breakthrough.  We continued hydroxyzine for sleep. Labs including TSH normal.  Patient states that she is doing better.  We increased the buspirone to 10 mg b.i.d. and she states this helped.  I gave her alprazolam 0.25 mg #10.  She states this helped for breakthrough anxiety.  She actually took half a tablet at times with relatively good relief.  We discussed the goal of avoiding long-term alprazolam use.  We will add different SSRI today (sertraline).  Patient states that she does not feel like a counselor will help her out at this point.  XIANG 7: 18, PHQ9: 20 (4/9/19)   XIANG 7: 14, PHQ9: 14 (4/23/19)   -continue buspirone 10 mg bid  -continue prn alprazolam 0.25 mg (discussed avoiding alcohol with this medication)  -start sertraline 25 mg x 7days and increase to 50 mg thereafter  -continue hydroxyzine 25 mg q.h.s. for sleep.  -follow-up 1 month.     HTN:   HCTZ 25 mg qd  Blood pressure continues to be elevated.  We discussed continuing to monitor until depression anxiety stable.  If still elevated will add additional medication.  -continue HCTZ 25 mg qd.     Health maintenance:  -schedule CSC  -mammogram    Current Outpatient Medications on File Prior to Visit   Medication Sig Dispense Refill    acetaminophen (TYLENOL EXTRA STRENGTH) 500 MG tablet Take 1,000 mg by mouth daily as needed. As directed on package      hydroCHLOROthiazide  (HYDRODIURIL) 25 MG tablet Take 1 tablet (25 mg total) by mouth once daily. 90 tablet 0    [DISCONTINUED] ALPRAZolam (XANAX) 0.25 MG tablet Take 1 tablet (0.25 mg total) by mouth daily as needed for Anxiety. 10 tablet 0    [DISCONTINUED] busPIRone (BUSPAR) 5 MG Tab Take 1 tablet (5 mg total) by mouth 2 (two) times daily. 60 tablet 1    fluticasone (FLONASE) 50 mcg/actuation nasal spray 1 spray (50 mcg total) by Each Nare route 2 (two) times daily. 1 Bottle 2     No current facility-administered medications on file prior to visit.      I personally reviewed past medical, family and social history.  Review of Systems   Constitutional: Negative for activity change, fever and unexpected weight change.   HENT: Negative for facial swelling, hearing loss and trouble swallowing.    Eyes: Negative for visual disturbance.   Respiratory: Negative for cough, chest tightness, shortness of breath and wheezing.    Cardiovascular: Negative for chest pain, palpitations and leg swelling.   Gastrointestinal: Negative for abdominal pain, blood in stool, constipation, diarrhea, nausea and vomiting.   Endocrine: Negative for cold intolerance, polydipsia, polyphagia and polyuria.   Genitourinary: Negative for decreased urine volume and dysuria.   Musculoskeletal: Negative for gait problem and neck pain.   Skin: Negative for rash.   Neurological: Negative for dizziness, syncope and light-headedness.   Psychiatric/Behavioral: Positive for dysphoric mood. The patient is nervous/anxious.        Objective:     Vitals:    04/23/19 0925   BP: (!) 142/98   Pulse: 70        Physical Exam   Constitutional: She is oriented to person, place, and time. She appears well-developed and well-nourished. No distress.   HENT:   Head: Normocephalic and atraumatic.   Eyes: Pupils are equal, round, and reactive to light. EOM are normal. Right eye exhibits no discharge. Left eye exhibits no discharge. No scleral icterus.   Neck: Normal range of motion. Neck  supple. No JVD present. No thyromegaly present.   Cardiovascular: Normal rate, regular rhythm and normal heart sounds. Exam reveals no gallop and no friction rub.   No murmur heard.  Pulmonary/Chest: Effort normal and breath sounds normal. No respiratory distress. She has no wheezes.   Abdominal: Soft. Bowel sounds are normal. She exhibits no distension and no mass. There is no tenderness.   Mass in right midline.  Patient states this is lap band port   Musculoskeletal: Normal range of motion. She exhibits no edema.   Lymphadenopathy:     She has no cervical adenopathy.   Neurological: She is alert and oriented to person, place, and time. No cranial nerve deficit. Coordination normal.   Skin: Skin is warm and dry. Capillary refill takes less than 2 seconds. No rash noted. She is not diaphoretic.   Psychiatric: She has a normal mood and affect. Her behavior is normal.       Assessment/Plan   Eugenie was seen today for anxiety and depression.    Diagnoses and all orders for this visit:    Anxiety and depression  -     sertraline (ZOLOFT) 25 MG tablet; Take 1 tablet (25 mg total) by mouth once daily for 7 days, THEN 2 tablets (50 mg total) once daily for 23 days.  -     busPIRone (BUSPAR) 10 MG tablet; Take 1 tablet (10 mg total) by mouth 2 (two) times daily.  -     ALPRAZolam (XANAX) 0.25 MG tablet; Take 1 tablet (0.25 mg total) by mouth nightly as needed for Anxiety.  -     hydrOXYzine HCl (ATARAX) 25 MG tablet; Take 1 tablet (25 mg total) by mouth every evening.    Screening for colon cancer  -     Case request GI: COLONOSCOPY    Screening for breast cancer  -     Mammo Digital Screening Bilat; Future

## 2019-04-25 ENCOUNTER — PATIENT MESSAGE (OUTPATIENT)
Dept: GASTROENTEROLOGY | Facility: CLINIC | Age: 54
End: 2019-04-25

## 2019-05-02 ENCOUNTER — HOSPITAL ENCOUNTER (OUTPATIENT)
Dept: RADIOLOGY | Facility: HOSPITAL | Age: 54
Discharge: HOME | End: 2019-05-02
Attending: INTERNAL MEDICINE
Payer: COMMERCIAL

## 2019-05-02 DIAGNOSIS — Z12.39 SCREENING FOR BREAST CANCER: ICD-10-CM

## 2019-05-02 PROCEDURE — 77067 SCR MAMMO BI INCL CAD: CPT | Mod: TC,PO

## 2019-05-02 PROCEDURE — 77067 MAMMO DIGITAL SCREENING BILAT WITH TOMOSYNTHESIS_CAD: ICD-10-PCS | Mod: 26,,, | Performed by: RADIOLOGY

## 2019-05-02 PROCEDURE — 77063 MAMMO DIGITAL SCREENING BILAT WITH TOMOSYNTHESIS_CAD: ICD-10-PCS | Mod: 26,,, | Performed by: RADIOLOGY

## 2019-05-02 PROCEDURE — 77067 SCR MAMMO BI INCL CAD: CPT | Mod: 26,,, | Performed by: RADIOLOGY

## 2019-05-02 PROCEDURE — 77063 BREAST TOMOSYNTHESIS BI: CPT | Mod: 26,,, | Performed by: RADIOLOGY

## 2019-05-17 ENCOUNTER — TELEPHONE (OUTPATIENT)
Dept: GASTROENTEROLOGY | Facility: CLINIC | Age: 54
End: 2019-05-17

## 2019-05-21 ENCOUNTER — PATIENT MESSAGE (OUTPATIENT)
Dept: FAMILY MEDICINE | Facility: CLINIC | Age: 54
End: 2019-05-21

## 2019-05-21 ENCOUNTER — PATIENT MESSAGE (OUTPATIENT)
Dept: GASTROENTEROLOGY | Facility: CLINIC | Age: 54
End: 2019-05-21

## 2019-05-21 DIAGNOSIS — F32.A ANXIETY AND DEPRESSION: ICD-10-CM

## 2019-05-21 DIAGNOSIS — F41.9 ANXIETY AND DEPRESSION: ICD-10-CM

## 2019-05-21 RX ORDER — BUSPIRONE HYDROCHLORIDE 10 MG/1
20 TABLET ORAL 2 TIMES DAILY
Qty: 60 TABLET | Refills: 1 | Status: SHIPPED | OUTPATIENT
Start: 2019-05-21 | End: 2019-07-01 | Stop reason: SDUPTHER

## 2019-05-21 RX ORDER — SERTRALINE HYDROCHLORIDE 50 MG/1
50 TABLET, FILM COATED ORAL DAILY
Qty: 30 TABLET | Refills: 2 | Status: SHIPPED | OUTPATIENT
Start: 2019-05-21 | End: 2019-07-22 | Stop reason: SDUPTHER

## 2019-05-21 NOTE — TELEPHONE ENCOUNTER
Please see request for refill   LOV 4/23/2019    Patient is requesting change in  Dosage for buspar.  Please advise

## 2019-05-21 NOTE — TELEPHONE ENCOUNTER
Please let her know that I refilled her sertraline as 50 mg daily since the original prescription was a ramp up dosing protocol.  I also increased her buspirone to 20 mg b.i.d.

## 2019-05-27 ENCOUNTER — OFFICE VISIT (OUTPATIENT)
Dept: OTOLARYNGOLOGY | Facility: CLINIC | Age: 54
End: 2019-05-27
Payer: COMMERCIAL

## 2019-05-27 VITALS
HEIGHT: 67 IN | DIASTOLIC BLOOD PRESSURE: 94 MMHG | WEIGHT: 212 LBS | SYSTOLIC BLOOD PRESSURE: 148 MMHG | BODY MASS INDEX: 33.27 KG/M2

## 2019-05-27 DIAGNOSIS — B36.9 FUNGAL OTITIS EXTERNA: Primary | ICD-10-CM

## 2019-05-27 DIAGNOSIS — H61.23 BILATERAL HEARING LOSS DUE TO CERUMEN IMPACTION: ICD-10-CM

## 2019-05-27 DIAGNOSIS — H61.23 BILATERAL IMPACTED CERUMEN: ICD-10-CM

## 2019-05-27 DIAGNOSIS — H62.40 FUNGAL OTITIS EXTERNA: Primary | ICD-10-CM

## 2019-05-27 PROCEDURE — 99214 PR OFFICE/OUTPT VISIT, EST, LEVL IV, 30-39 MIN: ICD-10-PCS | Mod: 25,S$GLB,, | Performed by: NURSE PRACTITIONER

## 2019-05-27 PROCEDURE — 3080F DIAST BP >= 90 MM HG: CPT | Mod: CPTII,S$GLB,, | Performed by: NURSE PRACTITIONER

## 2019-05-27 PROCEDURE — 69210 REMOVE IMPACTED EAR WAX UNI: CPT | Mod: S$GLB,,, | Performed by: NURSE PRACTITIONER

## 2019-05-27 PROCEDURE — 99999 PR PBB SHADOW E&M-EST. PATIENT-LVL III: ICD-10-PCS | Mod: PBBFAC,,, | Performed by: NURSE PRACTITIONER

## 2019-05-27 PROCEDURE — 99214 OFFICE O/P EST MOD 30 MIN: CPT | Mod: 25,S$GLB,, | Performed by: NURSE PRACTITIONER

## 2019-05-27 PROCEDURE — 3077F PR MOST RECENT SYSTOLIC BLOOD PRESSURE >= 140 MM HG: ICD-10-PCS | Mod: CPTII,S$GLB,, | Performed by: NURSE PRACTITIONER

## 2019-05-27 PROCEDURE — 3080F PR MOST RECENT DIASTOLIC BLOOD PRESSURE >= 90 MM HG: ICD-10-PCS | Mod: CPTII,S$GLB,, | Performed by: NURSE PRACTITIONER

## 2019-05-27 PROCEDURE — 3077F SYST BP >= 140 MM HG: CPT | Mod: CPTII,S$GLB,, | Performed by: NURSE PRACTITIONER

## 2019-05-27 PROCEDURE — 87107 FUNGI IDENTIFICATION MOLD: CPT

## 2019-05-27 PROCEDURE — 87070 CULTURE OTHR SPECIMN AEROBIC: CPT

## 2019-05-27 PROCEDURE — 69210 PR REMOVAL IMPACTED CERUMEN REQUIRING INSTRUMENTATION, UNILATERAL: ICD-10-PCS | Mod: S$GLB,,, | Performed by: NURSE PRACTITIONER

## 2019-05-27 PROCEDURE — 99999 PR PBB SHADOW E&M-EST. PATIENT-LVL III: CPT | Mod: PBBFAC,,, | Performed by: NURSE PRACTITIONER

## 2019-05-27 PROCEDURE — 3008F BODY MASS INDEX DOCD: CPT | Mod: CPTII,S$GLB,, | Performed by: NURSE PRACTITIONER

## 2019-05-27 PROCEDURE — 3008F PR BODY MASS INDEX (BMI) DOCUMENTED: ICD-10-PCS | Mod: CPTII,S$GLB,, | Performed by: NURSE PRACTITIONER

## 2019-05-27 NOTE — LETTER
May 27, 2019      Westley Guo, DO  [address] [facility] [address] [place] [address]           [facility]  [address] [facility] [address] [place] [address]  Phone: [phone]  Fax: [phone]          Patient: Eugenie Najera   MR Number: [id number]   YOB: 1965   Date of Visit: 5/27/2019       Dear Dr. Westley Guo:    Thank you for referring Eugenie Najera to me for evaluation. Attached you will find relevant portions of my assessment and plan of care.    If you have questions, please do not hesitate to call me. I look forward to following Eugenie Najera along with you.    Sincerely,    Wen Cristobal NP    Enclosure  CC:  No Recipients    If you would like to receive this communication electronically, please contact [email or web address] or [phone] to request more information on [facility] access.    For providers and/or their staff who would like to refer a patient to [facility], please contact us through our one-stop-shop provider referral line, [facility], at [phone].    If you feel you have received this communication in error or would no longer like to receive these types of communications, please e-mail [email or web address]

## 2019-05-27 NOTE — PROGRESS NOTES
"Subjective:       Patient ID: Eugenie Najera is a 53 y.o. female.    Chief Complaint: Ear Fullness (possible ear infection per pt )    HPI   Patient had ear cleaned out about 6 weeks ago by Dr. Horowitz. Then she states she went to [facility] where they cleaned her ears out and gave her ofloxacin gtts. She continues to experience "liquid goop" in her ears, muffling her hearing. Prescribed ear gtts not helping. Not painful.     Review of Systems   Constitutional: Negative.    HENT: Positive for ear discharge and hearing loss. Negative for ear pain.    Eyes: Negative.    Respiratory: Negative.    Cardiovascular: Negative.    Gastrointestinal: Negative.    Musculoskeletal: Negative.    Skin: Negative.    Neurological: Negative.    Hematological: Negative.    Psychiatric/Behavioral: Negative.        Objective:      Physical Exam   Constitutional: She is oriented to person, place, and time. Vital signs are normal. She appears well-developed and well-nourished. She is cooperative. She does not appear ill. No distress.   HENT:   Head: Normocephalic and atraumatic.   Right Ear: Hearing, tympanic membrane, external ear and ear canal normal. Tympanic membrane is not erythematous. No middle ear effusion.   Left Ear: Hearing, tympanic membrane, external ear and ear canal normal. Tympanic membrane is not erythematous.  No middle ear effusion.   Nose: Nose normal.   Mouth/Throat: Uvula is midline, oropharynx is clear and moist and mucous membranes are normal.     SEPARATE PROCEDURE IN OFFICE:   Procedure: Removal of impacted cerumen, bilateral   Pre Procedure Diagnosis: Cerumen Impaction   Post Procedure Diagnosis: Cerumen Impaction   Verbal informed consent in regards to risk of trauma to ear canal, ear drum or hearing, discomfort during procedure and/or inability to remove cerumen impaction in one session or unforeseen events or complications.   No anesthesia.     Procedure in detail:   Ear canal visualized bilateral with appropriate size " ear speculum utilizing Operating Head Binocular Otomicroscope   Utilizing the following:  Suction cannula was used. The impacted cerumen of the ear canals was removed atraumatically. The TM and EAC were then inspected and found to be clear of wax. See description of TMs/EACs in PE above.   Complications: No   Condition: Improved/Good    Culture sent from left EAC    Gentian violet and CSF powder applied AU.     Eyes: EOM and lids are normal. Right eye exhibits no discharge. Left eye exhibits no discharge. No scleral icterus.   Neck: Trachea normal and normal range of motion. Neck supple. No tracheal deviation present.   Cardiovascular: Normal rate.   Pulmonary/Chest: Effort normal. No stridor. No respiratory distress. She has no wheezes.   Musculoskeletal: Normal range of motion.   Neurological: She is alert and oriented to person, place, and time. She has normal strength. Coordination and gait normal.   Skin: Skin is warm, dry and intact. She is not diaphoretic. No cyanosis. No pallor.   Psychiatric: She has a normal mood and affect. Her speech is normal and behavior is normal. Judgment and thought content normal. Cognition and memory are normal.   Nursing note and vitals reviewed.      Assessment:       1. Fungal otitis externa bilateral     Bilateral cerumen impactions removed  Plan:     Culture sent    Gentian violet and CSF powder applied AU  Keep ears dry  Recheck in 2 weeks

## 2019-05-30 ENCOUNTER — TELEPHONE (OUTPATIENT)
Dept: OTOLARYNGOLOGY | Facility: CLINIC | Age: 54
End: 2019-05-30

## 2019-05-30 ENCOUNTER — OFFICE VISIT (OUTPATIENT)
Dept: PSYCHIATRY | Facility: CLINIC | Age: 54
End: 2019-05-30
Payer: COMMERCIAL

## 2019-05-30 VITALS
HEIGHT: 67 IN | HEART RATE: 74 BPM | SYSTOLIC BLOOD PRESSURE: 144 MMHG | DIASTOLIC BLOOD PRESSURE: 94 MMHG | WEIGHT: 216.5 LBS | BODY MASS INDEX: 33.98 KG/M2

## 2019-05-30 DIAGNOSIS — F41.1 GAD (GENERALIZED ANXIETY DISORDER): ICD-10-CM

## 2019-05-30 PROCEDURE — 99999 PR PBB SHADOW E&M-EST. PATIENT-LVL III: CPT | Mod: PBBFAC,,, | Performed by: NURSE PRACTITIONER

## 2019-05-30 PROCEDURE — 90792 PSYCH DIAG EVAL W/MED SRVCS: CPT | Mod: S$GLB,,, | Performed by: NURSE PRACTITIONER

## 2019-05-30 PROCEDURE — 99999 PR PBB SHADOW E&M-EST. PATIENT-LVL III: ICD-10-PCS | Mod: PBBFAC,,, | Performed by: NURSE PRACTITIONER

## 2019-05-30 PROCEDURE — 90792 PR PSYCHIATRIC DIAGNOSTIC EVALUATION W/MEDICAL SERVICES: ICD-10-PCS | Mod: S$GLB,,, | Performed by: NURSE PRACTITIONER

## 2019-05-30 RX ORDER — CLONAZEPAM 0.5 MG/1
TABLET ORAL
Qty: 15 TABLET | Refills: 0 | Status: SHIPPED | OUTPATIENT
Start: 2019-05-30 | End: 2019-06-24 | Stop reason: SDUPTHER

## 2019-05-30 NOTE — TELEPHONE ENCOUNTER
----- Message from Wen Cristobal NP sent at 5/30/2019 12:59 PM CDT -----  Fungus in ear. The gentian violet and CSF powder should be sufficient, but f/u appt needed to ensure. Keep ear dry!

## 2019-05-30 NOTE — PROGRESS NOTES
"Outpatient Psychiatry Initial Visit  05/30/2019    ID:  53 year old female presenting for an initial evaluation. Met with patient.    Reason for encounter: Referral from Dr. Guo. Patient complains of anxiety/depression.    History of Present Illness: Pt. is a  53 year old female who is presenting to the clinic for an initial evaluation and treatment. Referral from Dr. Guo who has been managing her psychiatric medications, currently taking Zoloft 50 mg QD, Buspirone 10mg BID, Xanax 0.25 mg QD PRN, Vistaril 25 mg QHS for sleep. Pt was started on these meds about two months. Pt speaks to having achieved moderate results from these meds so far. Reports first being seen by psych in 2001 following a divorce, in which she was prescribed Paxil with good results.    "I feel like my anxiety has been getting worse. I feel like I'm going to jump through my skin. I have a very high stress job that requires me to meet deadlines and perform, and the anxiety has been getting in the way of that"    Pt speaks to a long standing history of anxiety and some depression. She states "there probably have been some events that have made things worse." Pt reports recently ending a long-term relationship, while almost simultaneously discovering her father had two strokes. Also, during this time, her son came out as lovell. She has accepted this, although initially was a major stressor her.    Pt's anxiety has started interfering with her daily functioning and ability to perform at work. She describes herself as a "wrorrier at times". She frequently feels scatter-brained and forgetful. She endorses generalized and ruminative worry about "wanting to run away." She struggles with feelings of restlessness "on edge, tense". She has noticed increased muscle tension in her neck and often clinches her jaw. Reports some fatigue, "I struggle to make it through the day sometimes". Endorses "anxiety attacks" due to a buildup of situational stress, " "but denies panic attacks.     Denies s/sx of depression today.    Pt currently endorses or denies the following symptoms:  Psych ROS:  Depression: Sleeps 6-7 hours nightly, restful with Vistaril, denies anhedonia, denies hopelessness/guilt, + fatigue, dec'd concentration, denies psychomotor slowing, no SI  Anxiety: + generalized and ruminative worry, + irritability, + feeling restless, + fatigue, no panic attacks, no agoraphobia, no social anxiety,   PTSD: no flashbacks, nightmares, or avoidance of stimuli  Marli/Psychosis: No manic episodes, no A/V hallucinations  SI - No SI - access to guns? no    Past Psychiatric History:  Past Psych Hx: First psych contact:    Prior hospitalizations: denies Prior suicide attempts or self harm: denies  Prior diagnosis: XIANG  Prior meds: Paxil  Current meds: Zoloft 50 mg QD, Buspirone 10mg BID, Xanax 0.25 mg QD PRN, Vistaril 25 mg QHS  Prior psychotherapy: Yes, several years ago with an "ex-fiance"      Past Medical Hx: Hx of TBI?  denies    Hx of seizures? denies  Past Medical History:   Diagnosis Date    Anemia     patient states has resilved    Hypertension     PONV (postoperative nausea and vomiting)     Thyroid disorder        Past Surgical Hx:  Past Surgical History:   Procedure Laterality Date    AUGMENTATION OF BREAST Bilateral     implants with lift    BREAST SURGERY  2009    lift with implants     SECTION      X2    DILATION AND CURETTAGE OF UTERUS      EGD (ESOPHAGOGASTRODUODENOSCOPY) N/A 2018    Per[facility] Jun[facility]aird Jr[facility]    lapband  ~2006    (in [place])    LARYNGOSCOPY with biopsy lesion right pyriform sinus Right 3/21/2019    [facility]    TUBAL LIGATION         Family Hx:   Paternal: grandfather alcoholic, no hx of mental illness, denies hx of suicide  Maternal: her half sister dx with bipolar schizophrenia and alcoholism, has attempted suicide"a few times", denies other mental " "illness,     Social Hx:   Childhood: B/R in [place], [place], parents split when she was 3, has "multiple step father", 3 half sisters, good childhood  Marital Status: 2 divorces, single now  Children: 2 children - Jai age 19, Keya 29   Resides: [place]  Occupation: [employer]   Hobbies: Bike riding  Christianity: None  Education level: AD  : denies  Legal: denies    Substance Hx:  Tobacco: never   Alcohol: 3-5 glasses wine weekly, social   Drug use: denies  Caffeine: 2 soft drinks daily  Rehab: denies  Prior/current AA? denies    Review of Symptoms  GENERAL: no weight gain/loss  SKIN: no rashes or lacerations  HEAD: no headahces  EYES: no jaundice, blindness. No exophthalmos  EARS: no dizziness, tinnitus, or hearing loss  NOSE: no changes in smell  Mouth/throat: no dyskinetic movements or obvious goiter  CHEST: no SOB, hyperventilation or cough  CARDIO: no tachycardia, bradycardia, or chest pain  ABDOMEN: no nausea, vomiting, pain, constipation, or diarrhea  URINARY:  no frequency, dysuria, or sexual dysfunction  ENDOCRINE: No polydipsia, polyuria, no cold/hot intolerance  MUSCULOSKELETAL: no joint pain/stiffness  NEUROLOGIC: no weakness or sensory changes, no seizures, no confusion, memory loss, or forgetfulness, no tremor or abnormal movements    Current Evaluation:  Nutritional Screening:  Considering the patient's height and weight, medications, medical history and preferences, should a referral be made to the dietitian? No  Vitals: most recent vitals signs, dated greater than 90 days prior to this appointment, were reviewed  General: age appropriate, well nourished, casually dressed, neatly groomed  MSK: muscle strength/tone : no tremor or abnormal movements. Gait/Station: no ataxic, steady    Suicide Risk Assessment:  Protective factors: age, gender, no prior attempts, no prior hospitalizations, no ongoing substance abuse, no psychosis, has children, denies SI/intent/plan, seeking " "treatment, access to treatment, future oriented, good primary support, no access to firearms? no    Risks:     Patient is a low immediate and long-term risk considering risk factors    Psychiatric:  Speech: Normal rate, rhythm, volume. No latency, no pressured speech  Mood/Affect: euthymic, congruent and appropriate   Though Process: organized, logical, linear  Thought Content: no suicidal or homicidal ideation, no A/V hallucinations, delusions or paranoia  Insight: Intact; aware of illness  Judgement: behavior is adequate to circumstances  Orientation: A&O x 4,  Memory: Intact for content of interview, 3/3 immediate, 3/3 after 3 mins. Able to recall recent and remote events.  Language: Grossly intact, no aphasias   Concentration: Spells "world" correctly forward & backwards  Knowledge/Intelligence: appropriate to age and level of education.   Spouse/Partner: Supportive    ASSESSMENT - DIAGNOSIS - GOALS:  Impression:  Pt. is a  53 year old female who is presenting to the clinic for an initial evaluation and treatment. Referral from Dr. Guo who has been managing her psychiatric medications, currently taking Zoloft 50 mg QD, Buspirone 10mg BID, Xanax 0.25 mg QD PRN, Vistaril 25 mg QHS for sleep. Pt was started on these meds about two months. Pt speaks to having achieved moderate results from these meds so far. Reports first being seen by psych in 2001 following a divorce, in which she was prescribed Paxil with good results.    "I feel like my anxiety has been getting worse. I feel like I'm going to jump through my skin. I have a very high stress job that requires me to meet deadlines and perform, and the anxiety has been getting in the way of that"    Pt speaks to a long standing history of anxiety and some depression. She states "there probably have been some events that have made things worse." Pt reports recently ending a long-term relationship, while almost simultaneously discovering her father had two " "strokes. Also, during this time, her son came out as lovell. She has accepted this, although initially was a major stressor her.    Pt's anxiety has started interfering with her daily functioning and ability to perform at work. She describes herself as a "wrorrier at times". She frequently feels scatter-brained and forgetful. She endorses generalized and ruminative worry about "wanting to run away." She struggles with feelings of restlessness "on edge, tense". She has noticed increased muscle tension in her neck and often clinches her jaw. Reports some fatigue, "I struggle to make it through the day sometimes". Endorses "anxiety attacks" due to a buildup of situational stress, but denies panic attacks.     Denies s/sx of depression today    Pt signs/symptoms consistent with XIANG.          Safe for outpatient tx and no acute safety concerns.  Diagnosis/Diagnoses: XIANG    Strengths/Liabilities: Patient accepts feedback & guidance. Patient is motivated for change.     Treatment Goals: Specify outcomes written in observable, behavioral terms  Anxiety: acquire relapse prevention skills, reduce physical symptoms of anxiety, reduce time spent worrying (>30 minutes/day)    Treatment Plan/Recommendations:   Medication Management: The risks and benefits of medication were discussed with the patient.   Meds:    1) Increase Zoloft to 75 mg QD for anxiety. Discussed potential for GI side effects, sexual dysfunction, mood destabilization, headaches  2) Start Klonopin 0.25 mg QD PRN. Discussed risk of decreased RT, sedation, addictive potential, and not to mix with alcohol.  3)  Buspirone 10 mg BID  4) Continue Vistaril 25 mg QHS for sleep.    Labs: no new orders; previous labs reviewed  Return to Clinic: 4 weeks  Counseling time: 35 mins  Total time: 60 mins    -  Patient given contact # for psychotherapists at [facility] and also instructed they may check with insurance for a list of providers.   -Call to report any worsening " of symptoms or problems associated with medication  - Pt instructed to go to ER if thoughts of harming self or others arise     -Spent 60min face to face with the pt; >50% time spent in counseling   -Supportive therapy and psychoeducation provided  -R/B/SE's of medications discussed with the pt who expresses understanding and chooses to take medications as prescribed.   -Pt instructed to call clinic, 911 or go to nearest emergency room if sxs worsen or pt is in   crisis. The pt expresses understanding.    Baljit Rubin, NP

## 2019-06-05 LAB — BACTERIA SPEC AEROBE CULT: NORMAL

## 2019-06-11 ENCOUNTER — OFFICE VISIT (OUTPATIENT)
Dept: OTOLARYNGOLOGY | Facility: CLINIC | Age: 54
End: 2019-06-11
Payer: COMMERCIAL

## 2019-06-11 VITALS
SYSTOLIC BLOOD PRESSURE: 120 MMHG | HEIGHT: 67 IN | DIASTOLIC BLOOD PRESSURE: 79 MMHG | RESPIRATION RATE: 16 BRPM | WEIGHT: 215.19 LBS | BODY MASS INDEX: 33.78 KG/M2 | HEART RATE: 66 BPM

## 2019-06-11 DIAGNOSIS — B36.9 FUNGAL OTITIS EXTERNA: Primary | ICD-10-CM

## 2019-06-11 DIAGNOSIS — H62.40 FUNGAL OTITIS EXTERNA: Primary | ICD-10-CM

## 2019-06-11 PROCEDURE — 3074F SYST BP LT 130 MM HG: CPT | Mod: CPTII,S$GLB,, | Performed by: NURSE PRACTITIONER

## 2019-06-11 PROCEDURE — 99213 OFFICE O/P EST LOW 20 MIN: CPT | Mod: S$GLB,,, | Performed by: NURSE PRACTITIONER

## 2019-06-11 PROCEDURE — 3074F PR MOST RECENT SYSTOLIC BLOOD PRESSURE < 130 MM HG: ICD-10-PCS | Mod: CPTII,S$GLB,, | Performed by: NURSE PRACTITIONER

## 2019-06-11 PROCEDURE — 99999 PR PBB SHADOW E&M-EST. PATIENT-LVL III: ICD-10-PCS | Mod: PBBFAC,,, | Performed by: NURSE PRACTITIONER

## 2019-06-11 PROCEDURE — 99213 PR OFFICE/OUTPT VISIT, EST, LEVL III, 20-29 MIN: ICD-10-PCS | Mod: S$GLB,,, | Performed by: NURSE PRACTITIONER

## 2019-06-11 PROCEDURE — 3078F DIAST BP <80 MM HG: CPT | Mod: CPTII,S$GLB,, | Performed by: NURSE PRACTITIONER

## 2019-06-11 PROCEDURE — 3008F PR BODY MASS INDEX (BMI) DOCUMENTED: ICD-10-PCS | Mod: CPTII,S$GLB,, | Performed by: NURSE PRACTITIONER

## 2019-06-11 PROCEDURE — 3078F PR MOST RECENT DIASTOLIC BLOOD PRESSURE < 80 MM HG: ICD-10-PCS | Mod: CPTII,S$GLB,, | Performed by: NURSE PRACTITIONER

## 2019-06-11 PROCEDURE — 99999 PR PBB SHADOW E&M-EST. PATIENT-LVL III: CPT | Mod: PBBFAC,,, | Performed by: NURSE PRACTITIONER

## 2019-06-11 PROCEDURE — 3008F BODY MASS INDEX DOCD: CPT | Mod: CPTII,S$GLB,, | Performed by: NURSE PRACTITIONER

## 2019-06-11 NOTE — PROGRESS NOTES
Subjective:       Patient ID: Eugenie Najera is a 53 y.o. female.    Chief Complaint: No chief complaint on file.    HPI   Patient states she had ear cleaned out by Dr. Horowitz, then went to [facility] where they cleaned her ears out and gave her ofloxacin gtts. Then she saw me on 5/27/19. Culture grew (+)Aspergillus flavus. This was treated with gentian violet and CSF powder. She has been keeping her ear dry. She returns today for recheck. Her ears are doing well without issues.    Review of Systems   Constitutional: Negative.    HENT: Negative for ear discharge, ear pain and hearing loss.    Eyes: Negative.    Respiratory: Negative.    Cardiovascular: Negative.    Gastrointestinal: Negative.    Musculoskeletal: Negative.    Skin: Negative.    Neurological: Negative.    Hematological: Negative.    Psychiatric/Behavioral: Negative.        Objective:      Physical Exam   Constitutional: She is oriented to person, place, and time. Vital signs are normal. She appears well-developed and well-nourished. She is cooperative. She does not appear ill. No distress.   HENT:   Head: Normocephalic and atraumatic.   Right Ear: Hearing, tympanic membrane, external ear and ear canal normal. Tympanic membrane is not erythematous. No middle ear effusion.   Left Ear: Hearing, tympanic membrane, external ear and ear canal normal. Tympanic membrane is not erythematous.  No middle ear effusion.   Nose: Nose normal.   Mouth/Throat: Uvula is midline, oropharynx is clear and moist and mucous membranes are normal.   Eyes: EOM and lids are normal. Right eye exhibits no discharge. Left eye exhibits no discharge. No scleral icterus.   Neck: Trachea normal and normal range of motion. Neck supple. No tracheal deviation present.   Cardiovascular: Normal rate.   Pulmonary/Chest: Effort normal. No stridor. No respiratory distress. She has no wheezes.   Musculoskeletal: Normal range of motion.   Neurological: She is alert and oriented to person, place, and  time. She has normal strength. Coordination and gait normal.   Skin: Skin is warm, dry and intact. She is not diaphoretic. No cyanosis. No pallor.   Psychiatric: She has a normal mood and affect. Her speech is normal and behavior is normal. Judgment and thought content normal. Cognition and memory are normal.   Nursing note and vitals reviewed.      Assessment:       1. Fungal otitis externa bilateral (+)Aspergillus flavus       Plan:         Gentian violet remains AU. Light dusting of CSF powder applied AU.  Continue to keep ears dry  Return for any ENT symptoms or concerns

## 2019-06-24 ENCOUNTER — OFFICE VISIT (OUTPATIENT)
Dept: PSYCHIATRY | Facility: CLINIC | Age: 54
End: 2019-06-24
Payer: COMMERCIAL

## 2019-06-24 VITALS
BODY MASS INDEX: 34.5 KG/M2 | WEIGHT: 219.81 LBS | SYSTOLIC BLOOD PRESSURE: 152 MMHG | HEIGHT: 67 IN | HEART RATE: 73 BPM | DIASTOLIC BLOOD PRESSURE: 103 MMHG

## 2019-06-24 DIAGNOSIS — F41.1 GAD (GENERALIZED ANXIETY DISORDER): Primary | ICD-10-CM

## 2019-06-24 PROCEDURE — 3080F DIAST BP >= 90 MM HG: CPT | Mod: CPTII,S$GLB,, | Performed by: NURSE PRACTITIONER

## 2019-06-24 PROCEDURE — 99213 OFFICE O/P EST LOW 20 MIN: CPT | Mod: S$GLB,,, | Performed by: NURSE PRACTITIONER

## 2019-06-24 PROCEDURE — 99999 PR PBB SHADOW E&M-EST. PATIENT-LVL III: CPT | Mod: PBBFAC,,, | Performed by: NURSE PRACTITIONER

## 2019-06-24 PROCEDURE — 3077F SYST BP >= 140 MM HG: CPT | Mod: CPTII,S$GLB,, | Performed by: NURSE PRACTITIONER

## 2019-06-24 PROCEDURE — 3008F PR BODY MASS INDEX (BMI) DOCUMENTED: ICD-10-PCS | Mod: CPTII,S$GLB,, | Performed by: NURSE PRACTITIONER

## 2019-06-24 PROCEDURE — 3080F PR MOST RECENT DIASTOLIC BLOOD PRESSURE >= 90 MM HG: ICD-10-PCS | Mod: CPTII,S$GLB,, | Performed by: NURSE PRACTITIONER

## 2019-06-24 PROCEDURE — 99213 PR OFFICE/OUTPT VISIT, EST, LEVL III, 20-29 MIN: ICD-10-PCS | Mod: S$GLB,,, | Performed by: NURSE PRACTITIONER

## 2019-06-24 PROCEDURE — 90833 PSYTX W PT W E/M 30 MIN: CPT | Mod: S$GLB,,, | Performed by: NURSE PRACTITIONER

## 2019-06-24 PROCEDURE — 3077F PR MOST RECENT SYSTOLIC BLOOD PRESSURE >= 140 MM HG: ICD-10-PCS | Mod: CPTII,S$GLB,, | Performed by: NURSE PRACTITIONER

## 2019-06-24 PROCEDURE — 99999 PR PBB SHADOW E&M-EST. PATIENT-LVL III: ICD-10-PCS | Mod: PBBFAC,,, | Performed by: NURSE PRACTITIONER

## 2019-06-24 PROCEDURE — 3008F BODY MASS INDEX DOCD: CPT | Mod: CPTII,S$GLB,, | Performed by: NURSE PRACTITIONER

## 2019-06-24 PROCEDURE — 90833 PR PSYCHOTHERAPY W/PATIENT W/E&M, 30 MIN (ADD ON): ICD-10-PCS | Mod: S$GLB,,, | Performed by: NURSE PRACTITIONER

## 2019-06-24 RX ORDER — CLONAZEPAM 0.5 MG/1
TABLET ORAL
Qty: 30 TABLET | Refills: 0 | Status: SHIPPED | OUTPATIENT
Start: 2019-06-28 | End: 2019-07-22 | Stop reason: SDUPTHER

## 2019-06-24 RX ORDER — SERTRALINE HYDROCHLORIDE 25 MG/1
25 TABLET, FILM COATED ORAL DAILY
Qty: 30 TABLET | Refills: 2 | Status: SHIPPED | OUTPATIENT
Start: 2019-06-24 | End: 2019-08-12 | Stop reason: SDUPTHER

## 2019-06-24 NOTE — PROGRESS NOTES
"Outpatient Psychiatry Follow-Up Visit    Clinical Status of Patient: Outpatient (Ambulatory)  06/24/2019     Chief Complaint: Pt is a 53 year old female  who presents today for a follow-up. Met with patient.       Interval History and Content of Current Session:  Interim Events/Subjective Report/Content of Current Session:  follow up appointment.    Pt is a  with past psychiatric hx of XIANG who presents for follow up treatment. She came to me taking Zoloft 50 mg QD, Buspirone 10mg BID, Xanax 0.25 mg QD PRN, Vistaril 25 mg QHS for sleep. During her initial eval, pet met criteria for XIANG and I increased her Zoloft to 75 mg QD. All other meds were continued at then-current dose. Since this increase, pt reports an overall decrease in anxiety levels. She reports less generalized and ruminative worry. She states "anxiety attacks have really decreased." She recently went on a trip where she almost missed her flight, and handled this surprisingly well. Klonopin worked well initially, but she feels that this has lost effect with time.     Sleep is good, appetite is good. Notes improved energy and concentration.       Past Psychiatric hx: . is a  53 year old female who is presenting to the clinic for an initial evaluation and treatment. Referral from Dr. Guo who has been managing her psychiatric medications, currently taking Zoloft 50 mg QD, Buspirone 10mg BID, Xanax 0.25 mg QD PRN, Vistaril 25 mg QHS for sleep. Pt was started on these meds about two months. Pt speaks to having achieved moderate results from these meds so far. Reports first being seen by psych in 2001 following a divorce, in which she was prescribed Paxil with good results.     "I feel like my anxiety has been getting worse. I feel like I'm going to jump through my skin. I have a very high stress job that requires me to meet deadlines and perform, and the anxiety has been getting in the way of that"     Pt speaks to a long standing history of anxiety and " "some depression. She states "there probably have been some events that have made things worse." Pt reports recently ending a long-term relationship, while almost simultaneously discovering her father had two strokes. Also, during this time, her son came out as lovell. She has accepted this, although initially was a major stressor her.     Pt's anxiety has started interfering with her daily functioning and ability to perform at work. She describes herself as a "wrorrier at times". She frequently feels scatter-brained and forgetful. She endorses generalized and ruminative worry about "wanting to run away." She struggles with feelings of restlessness "on edge, tense". She has noticed increased muscle tension in her neck and often clinches her jaw. Reports some fatigue, "I struggle to make it through the day sometimes". Endorses "anxiety attacks" due to a buildup of situational stress, but denies panic attacks.      Past Medical hx:   Past Medical History:   Diagnosis Date    Anemia     patient states has resilved    Hypertension     PONV (postoperative nausea and vomiting)     Thyroid disorder         Interim hx:  Medication changes last visit:  Inc Zoloft to 75 mg QD  Anxiety: 5/10  Depression: 4/10      Denies suicidal/homicidal ideations.  Denies hopelessness/worthlessness.    Denies auditory/visual hallucinations      Tobacco: never   Alcohol: 3-5 glasses wine weekly, social   Drug use: denies  Caffeine: 2 soft drinks daily  Review of Systems   · PSYCHIATRIC: Pertinent items are noted in the narrative.        CONSTITUTIONAL: weight stable        M/S: no pain today         ENT: no allergies noted today        ABD: no n/v/d     Past Medical, Family and Social History: The patient's past medical, family and social history have been reviewed and updated as appropriate within the electronic medical record. See encounter notes.     Medication: Zoloft 75 mg QD, Buspirone 10mg BID, Klonopin 0.5 mg QD PRN, Vistaril 25 mg " "QHS for sleep     Compliance: yes      Side effects: tolerates     Risk Parameters:  Patient reports no suicidal ideation  Patient reports no homicidal ideation  Patient reports no self-injurious behavior  Patient reports no violent behavior     Exam (detailed: at least 9 elements; comprehensive: all 15 elements)   Constitutional  Vitals:  Most recent vital signs, dated less than 90 days prior to this appointment, were reviewed.  BP (!) 152/103   Pulse 73   Ht 5' 7" (1.702 m)   Wt 99.7 kg (219 lb 12.8 oz)   LMP 08/09/2015   BMI 34.43 kg/m²    General:  unremarkable, age appropriate, casual attire, good eye contact, good rapport       Musculoskeletal  Muscle Strength/Tone:  no flaccidity, no tremor    Gait & Station:  normal      Psychiatric                       Speech:  normal tone, normal rate, rhythm, and volume   Mood & Affect:   Euthymic, congruent, appropriate         Thought Process:   Goal directed; Linear    Associations:   intact   Thought Content:   No SI/HI, delusions, or paranoia, no AV/VH   Insight & Judgement:   Good, adequate to circumstances   Orientation:   grossly intact; alert and oriented x 4    Memory:  intact for content of interview    Language:  grossly intact, can repeat    Attention Span  : Grossly intact for content of interview   Fund of Knowledge:   intact and appropriate to age and level of education        Assessment and Diagnosis   Status/Progress: Based on the examination today, the patient's problem(s) is/are under fair control.  New problems have not been presented today. Comorbidities are not currently complicating management of the primary condition.      Impression:  Pt is a  with past psychiatric hx of XIANG who presents for follow up treatment. She came to me taking Zoloft 50 mg QD, Buspirone 10mg BID, Xanax 0.25 mg QD PRN, Vistaril 25 mg QHS for sleep. During her initial eval, pet met criteria for XIANG and I increased her Zoloft to 75 mg QD. All other meds were continued " "at then-current dose. Since this increase, pt reports an overall decrease in anxiety levels. She reports less generalized and ruminative worry. She states "anxiety attacks have really decreased." She recently went on a trip where she almost missed her flight, and handled this surprisingly well. Klonopin worked well initially, but she feels that this has lost effect with time.     Sleep is good, appetite is good. Notes improved energy and concentration.     Diagnosis: XIANG    Intervention/Counseling/Treatment Plan   · Medication Management:      1) Cont Zoloft 75 mg QD for anxiety. Discussed potential for GI side effects, sexual dysfunction, mood destabilization, headaches  2) Increase Klonopin to 0.5 mg QD PRN. Discussed risk of decreased RT, sedation, addictive potential, and not to mix with alcohol.  3)  Cont Buspirone 10 mg BID  4) Cont Vistaril 25 mg QHS for sleep      4. Call to report any worsening of symptoms or problems with the medication. Pt instructed to go to ER with thoughts of harming self, others     5. Patient given contact # for psychotherapists at [facility] and also instructed she may check with insurance for list of providers.      6. Labs: no new orders    Psychotherapy:   · Target symptoms: anxiety  · Why chosen therapy is appropriate versus another modality: relevant to diagnosis, patient responds to this modality  · Outcome monitoring methods: self-report, observation, feedback from family   · Therapeutic intervention type: supportive psychotherapy  · Topics discussed/themes: building skills sets for symptom management, symptom recognition, nutrition, exercise  · The patient's response to the intervention is accepting. The patient's progress toward treatment goals is positive progress.  · Duration of intervention: 20 minutes     Return to clinic: 4 weeks    -Spent 30min face to face with the pt; >50% time spent in counseling   -Supportive therapy and psychoeducation provided  -R/B/SE's " of medications discussed with the pt who expresses understanding and chooses to take medications as prescribed.   -Pt instructed to call clinic, 911 or go to nearest emergency room if sxs worsen or pt is in   crisis. The pt expresses understanding.    Baljit Rubin, NP

## 2019-07-01 DIAGNOSIS — F32.A ANXIETY AND DEPRESSION: ICD-10-CM

## 2019-07-01 DIAGNOSIS — F41.9 ANXIETY AND DEPRESSION: ICD-10-CM

## 2019-07-01 RX ORDER — BUSPIRONE HYDROCHLORIDE 10 MG/1
20 TABLET ORAL 2 TIMES DAILY
Qty: 60 TABLET | Refills: 1 | Status: SHIPPED | OUTPATIENT
Start: 2019-07-01 | End: 2019-07-22 | Stop reason: SDUPTHER

## 2019-07-22 ENCOUNTER — OFFICE VISIT (OUTPATIENT)
Dept: PSYCHIATRY | Facility: CLINIC | Age: 54
End: 2019-07-22
Payer: COMMERCIAL

## 2019-07-22 VITALS
HEIGHT: 67 IN | WEIGHT: 210.56 LBS | RESPIRATION RATE: 16 BRPM | HEART RATE: 76 BPM | DIASTOLIC BLOOD PRESSURE: 83 MMHG | SYSTOLIC BLOOD PRESSURE: 124 MMHG | BODY MASS INDEX: 33.05 KG/M2

## 2019-07-22 DIAGNOSIS — F32.A ANXIETY AND DEPRESSION: ICD-10-CM

## 2019-07-22 DIAGNOSIS — F41.9 ANXIETY AND DEPRESSION: ICD-10-CM

## 2019-07-22 DIAGNOSIS — F41.1 GAD (GENERALIZED ANXIETY DISORDER): Primary | ICD-10-CM

## 2019-07-22 PROCEDURE — 3008F PR BODY MASS INDEX (BMI) DOCUMENTED: ICD-10-PCS | Mod: CPTII,S$GLB,, | Performed by: NURSE PRACTITIONER

## 2019-07-22 PROCEDURE — 3008F BODY MASS INDEX DOCD: CPT | Mod: CPTII,S$GLB,, | Performed by: NURSE PRACTITIONER

## 2019-07-22 PROCEDURE — 99999 PR PBB SHADOW E&M-EST. PATIENT-LVL III: CPT | Mod: PBBFAC,,, | Performed by: NURSE PRACTITIONER

## 2019-07-22 PROCEDURE — 3074F PR MOST RECENT SYSTOLIC BLOOD PRESSURE < 130 MM HG: ICD-10-PCS | Mod: CPTII,S$GLB,, | Performed by: NURSE PRACTITIONER

## 2019-07-22 PROCEDURE — 99999 PR PBB SHADOW E&M-EST. PATIENT-LVL III: ICD-10-PCS | Mod: PBBFAC,,, | Performed by: NURSE PRACTITIONER

## 2019-07-22 PROCEDURE — 3079F PR MOST RECENT DIASTOLIC BLOOD PRESSURE 80-89 MM HG: ICD-10-PCS | Mod: CPTII,S$GLB,, | Performed by: NURSE PRACTITIONER

## 2019-07-22 PROCEDURE — 99213 PR OFFICE/OUTPT VISIT, EST, LEVL III, 20-29 MIN: ICD-10-PCS | Mod: S$GLB,,, | Performed by: NURSE PRACTITIONER

## 2019-07-22 PROCEDURE — 3079F DIAST BP 80-89 MM HG: CPT | Mod: CPTII,S$GLB,, | Performed by: NURSE PRACTITIONER

## 2019-07-22 PROCEDURE — 99213 OFFICE O/P EST LOW 20 MIN: CPT | Mod: S$GLB,,, | Performed by: NURSE PRACTITIONER

## 2019-07-22 PROCEDURE — 90833 PR PSYCHOTHERAPY W/PATIENT W/E&M, 30 MIN (ADD ON): ICD-10-PCS | Mod: S$GLB,,, | Performed by: NURSE PRACTITIONER

## 2019-07-22 PROCEDURE — 90833 PSYTX W PT W E/M 30 MIN: CPT | Mod: S$GLB,,, | Performed by: NURSE PRACTITIONER

## 2019-07-22 PROCEDURE — 3074F SYST BP LT 130 MM HG: CPT | Mod: CPTII,S$GLB,, | Performed by: NURSE PRACTITIONER

## 2019-07-22 RX ORDER — BUSPIRONE HYDROCHLORIDE 10 MG/1
10 TABLET ORAL 2 TIMES DAILY
Qty: 60 TABLET | Refills: 2 | Status: SHIPPED | OUTPATIENT
Start: 2019-07-22 | End: 2019-10-22 | Stop reason: SDUPTHER

## 2019-07-22 RX ORDER — SERTRALINE HYDROCHLORIDE 50 MG/1
50 TABLET, FILM COATED ORAL DAILY
Qty: 30 TABLET | Refills: 2 | Status: SHIPPED | OUTPATIENT
Start: 2019-07-22 | End: 2019-10-11 | Stop reason: SDUPTHER

## 2019-07-22 RX ORDER — CLONAZEPAM 0.5 MG/1
TABLET ORAL
Qty: 30 TABLET | Refills: 0 | Status: SHIPPED | OUTPATIENT
Start: 2019-07-23 | End: 2019-08-30 | Stop reason: SDUPTHER

## 2019-07-29 DIAGNOSIS — I10 ESSENTIAL (PRIMARY) HYPERTENSION: ICD-10-CM

## 2019-07-29 RX ORDER — HYDROCHLOROTHIAZIDE 25 MG/1
25 TABLET ORAL DAILY
Qty: 90 TABLET | Refills: 0 | Status: SHIPPED | OUTPATIENT
Start: 2019-07-29 | End: 2019-11-07 | Stop reason: SDUPTHER

## 2019-08-06 ENCOUNTER — TELEPHONE (OUTPATIENT)
Dept: GASTROENTEROLOGY | Facility: CLINIC | Age: 54
End: 2019-08-06

## 2019-08-06 NOTE — TELEPHONE ENCOUNTER
----- Message from RT Graeme sent at 8/6/2019  9:09 AM CDT -----  Contact: pt   pt , requesting to Cx her procedure appt of 08/13/2019, she will call back to reschedule, thanks.

## 2019-08-12 RX ORDER — SERTRALINE HYDROCHLORIDE 25 MG/1
25 TABLET, FILM COATED ORAL DAILY
Qty: 30 TABLET | Refills: 2 | Status: SHIPPED | OUTPATIENT
Start: 2019-08-12 | End: 2019-10-22 | Stop reason: SDUPTHER

## 2019-08-30 RX ORDER — CLONAZEPAM 0.5 MG/1
TABLET ORAL
Qty: 30 TABLET | Refills: 0 | Status: SHIPPED | OUTPATIENT
Start: 2019-08-30 | End: 2019-09-30 | Stop reason: SDUPTHER

## 2019-09-06 DIAGNOSIS — F41.9 ANXIETY AND DEPRESSION: ICD-10-CM

## 2019-09-06 DIAGNOSIS — F32.A ANXIETY AND DEPRESSION: ICD-10-CM

## 2019-09-06 RX ORDER — HYDROXYZINE HYDROCHLORIDE 25 MG/1
25 TABLET, FILM COATED ORAL NIGHTLY
Qty: 30 TABLET | Refills: 2 | Status: SHIPPED | OUTPATIENT
Start: 2019-09-06 | End: 2019-10-04 | Stop reason: SDUPTHER

## 2019-09-30 DIAGNOSIS — F32.A ANXIETY AND DEPRESSION: ICD-10-CM

## 2019-09-30 DIAGNOSIS — F41.9 ANXIETY AND DEPRESSION: ICD-10-CM

## 2019-09-30 RX ORDER — BUSPIRONE HYDROCHLORIDE 10 MG/1
10 TABLET ORAL 2 TIMES DAILY
Qty: 60 TABLET | Refills: 2 | Status: CANCELLED | OUTPATIENT
Start: 2019-09-30

## 2019-09-30 RX ORDER — CLONAZEPAM 0.5 MG/1
TABLET ORAL
Qty: 30 TABLET | Refills: 0 | Status: SHIPPED | OUTPATIENT
Start: 2019-09-30 | End: 2019-10-22 | Stop reason: SDUPTHER

## 2019-10-04 DIAGNOSIS — F41.9 ANXIETY AND DEPRESSION: ICD-10-CM

## 2019-10-04 DIAGNOSIS — F32.A ANXIETY AND DEPRESSION: ICD-10-CM

## 2019-10-05 RX ORDER — HYDROXYZINE HYDROCHLORIDE 25 MG/1
25 TABLET, FILM COATED ORAL NIGHTLY
Qty: 30 TABLET | Refills: 2 | Status: SHIPPED | OUTPATIENT
Start: 2019-10-05 | End: 2020-08-13

## 2019-10-11 DIAGNOSIS — F41.9 ANXIETY AND DEPRESSION: ICD-10-CM

## 2019-10-11 DIAGNOSIS — F32.A ANXIETY AND DEPRESSION: ICD-10-CM

## 2019-10-11 RX ORDER — SERTRALINE HYDROCHLORIDE 50 MG/1
TABLET, FILM COATED ORAL
Qty: 90 TABLET | Refills: 0 | Status: SHIPPED | OUTPATIENT
Start: 2019-10-11 | End: 2019-12-02 | Stop reason: SDUPTHER

## 2019-10-14 DIAGNOSIS — F41.9 ANXIETY AND DEPRESSION: ICD-10-CM

## 2019-10-14 DIAGNOSIS — F32.A ANXIETY AND DEPRESSION: ICD-10-CM

## 2019-10-14 RX ORDER — SERTRALINE HYDROCHLORIDE 50 MG/1
50 TABLET, FILM COATED ORAL DAILY
Qty: 90 TABLET | Refills: 0 | Status: CANCELLED | OUTPATIENT
Start: 2019-10-14

## 2019-10-22 ENCOUNTER — OFFICE VISIT (OUTPATIENT)
Dept: PSYCHIATRY | Facility: CLINIC | Age: 54
End: 2019-10-22
Payer: COMMERCIAL

## 2019-10-22 VITALS
WEIGHT: 207 LBS | SYSTOLIC BLOOD PRESSURE: 124 MMHG | HEART RATE: 71 BPM | RESPIRATION RATE: 16 BRPM | HEIGHT: 67 IN | DIASTOLIC BLOOD PRESSURE: 81 MMHG | BODY MASS INDEX: 32.49 KG/M2

## 2019-10-22 DIAGNOSIS — F32.A ANXIETY AND DEPRESSION: ICD-10-CM

## 2019-10-22 DIAGNOSIS — F39 UNSPECIFIED MOOD (AFFECTIVE) DISORDER: ICD-10-CM

## 2019-10-22 DIAGNOSIS — F41.9 ANXIETY AND DEPRESSION: ICD-10-CM

## 2019-10-22 DIAGNOSIS — F41.1 GAD (GENERALIZED ANXIETY DISORDER): Primary | ICD-10-CM

## 2019-10-22 PROCEDURE — 3008F BODY MASS INDEX DOCD: CPT | Mod: CPTII,S$GLB,, | Performed by: NURSE PRACTITIONER

## 2019-10-22 PROCEDURE — 3074F PR MOST RECENT SYSTOLIC BLOOD PRESSURE < 130 MM HG: ICD-10-PCS | Mod: CPTII,S$GLB,, | Performed by: NURSE PRACTITIONER

## 2019-10-22 PROCEDURE — 90833 PSYTX W PT W E/M 30 MIN: CPT | Mod: S$GLB,,, | Performed by: NURSE PRACTITIONER

## 2019-10-22 PROCEDURE — 99999 PR PBB SHADOW E&M-EST. PATIENT-LVL III: CPT | Mod: PBBFAC,,, | Performed by: NURSE PRACTITIONER

## 2019-10-22 PROCEDURE — 99214 PR OFFICE/OUTPT VISIT, EST, LEVL IV, 30-39 MIN: ICD-10-PCS | Mod: S$GLB,,, | Performed by: NURSE PRACTITIONER

## 2019-10-22 PROCEDURE — 3079F DIAST BP 80-89 MM HG: CPT | Mod: CPTII,S$GLB,, | Performed by: NURSE PRACTITIONER

## 2019-10-22 PROCEDURE — 3008F PR BODY MASS INDEX (BMI) DOCUMENTED: ICD-10-PCS | Mod: CPTII,S$GLB,, | Performed by: NURSE PRACTITIONER

## 2019-10-22 PROCEDURE — 3074F SYST BP LT 130 MM HG: CPT | Mod: CPTII,S$GLB,, | Performed by: NURSE PRACTITIONER

## 2019-10-22 PROCEDURE — 90833 PR PSYCHOTHERAPY W/PATIENT W/E&M, 30 MIN (ADD ON): ICD-10-PCS | Mod: S$GLB,,, | Performed by: NURSE PRACTITIONER

## 2019-10-22 PROCEDURE — 3079F PR MOST RECENT DIASTOLIC BLOOD PRESSURE 80-89 MM HG: ICD-10-PCS | Mod: CPTII,S$GLB,, | Performed by: NURSE PRACTITIONER

## 2019-10-22 PROCEDURE — 99999 PR PBB SHADOW E&M-EST. PATIENT-LVL III: ICD-10-PCS | Mod: PBBFAC,,, | Performed by: NURSE PRACTITIONER

## 2019-10-22 PROCEDURE — 99214 OFFICE O/P EST MOD 30 MIN: CPT | Mod: S$GLB,,, | Performed by: NURSE PRACTITIONER

## 2019-10-22 RX ORDER — CLONAZEPAM 0.5 MG/1
TABLET ORAL
Qty: 30 TABLET | Refills: 0 | Status: SHIPPED | OUTPATIENT
Start: 2019-10-28 | End: 2019-11-25 | Stop reason: SDUPTHER

## 2019-10-22 RX ORDER — BUSPIRONE HYDROCHLORIDE 10 MG/1
10 TABLET ORAL 2 TIMES DAILY
Qty: 60 TABLET | Refills: 2 | Status: SHIPPED | OUTPATIENT
Start: 2019-10-22 | End: 2019-12-02 | Stop reason: SDUPTHER

## 2019-10-22 RX ORDER — BIOTIN 1 MG
1000 TABLET ORAL 3 TIMES DAILY
COMMUNITY
End: 2022-10-07 | Stop reason: ALTCHOICE

## 2019-10-22 RX ORDER — SERTRALINE HYDROCHLORIDE 50 MG/1
50 TABLET, FILM COATED ORAL DAILY
Qty: 30 TABLET | Refills: 3 | Status: SHIPPED | OUTPATIENT
Start: 2019-10-22 | End: 2019-11-21

## 2019-10-22 RX ORDER — SERTRALINE HYDROCHLORIDE 25 MG/1
25 TABLET, FILM COATED ORAL DAILY
Qty: 30 TABLET | Refills: 2 | Status: SHIPPED | OUTPATIENT
Start: 2019-10-22 | End: 2019-12-02 | Stop reason: SDUPTHER

## 2019-10-22 NOTE — PROGRESS NOTES
"Outpatient Psychiatry Follow-Up Visit    Clinical Status of Patient: Outpatient (Ambulatory)  10/22/2019     Chief Complaint: Pt is a 53 year old female  who presents today for a follow-up. Met with patient.       Interval History and Content of Current Session:  Interim Events/Subjective Report/Content of Current Session:  follow up appointment.    Pt is a  with past psychiatric hx of XIANG who presents for follow up treatment. She is currently taking Zoloft 75 mg QD, Klonopin 0.5 mg QD, Buspirone 10mg BID, Vistaril 25 QHS.Since last visit, pt reports some continued generalized, ruminative worrying. Very easily overwhelmed. Attributes much of her stress to situational stressors. Sleeps around 6 hours each night, stating "it takes me hours to fall asleep". But once asleep, not difficulty staying asleep. "sometimes" feels rested upon waking. Energy good, improving. Has been going to the gym more often. Concentration good. Appetite increased. Denies panic attacks.    Denies any depressive symptoms since last visit.      Past Psychiatric hx: Pt is a  53 year old female who established care with me 05/19. Referral from Dr. Guo who has been managing her psychiatric medications,  Pt is a  with past psychiatric hx of XIANG who presents for follow up treatment. She came to me taking Zoloft 50 mg QD, Buspirone 10mg BID, Xanax 0.25 mg QD PRN, Vistaril 25 mg QHS for sleep. During her initial eval, pet met criteria for XIANG and I increased her Zoloft to 75 mg QD. No other med changes were made. Since increasing Zoloft, pt reports an overall decrease in anxiety levels. She reports less generalized and ruminative worry. She states "anxiety attacks have really decreased." She recently went on a trip where she almost missed her flight, and handled this surprisingly well. Klonopin worked well initially, but she feels that this has lost effect with time.  Pt speaks to having achieved moderate results from these meds so far. Reports " "first being seen by psych in 2001 following a divorce, in which she was prescribed Paxil with good results.     Hx:  "I feel like my anxiety has been getting worse. I feel like I'm going to jump through my skin. I have a very high stress job that requires me to meet deadlines and perform, and the anxiety has been getting in the way of that"     Pt speaks to a long standing history of anxiety and some depression. She states "there probably have been some events that have made things worse." Pt reports recently ending a long-term relationship, while almost simultaneously discovering her father had two strokes. Also, during this time, her son came out as lovell. She has accepted this, although initially was a major stressor her. Notes periods of apathy, lack of motivation, low mood. Energy and concentration also dec'd during these times.     Pt's anxiety has started interfering with her daily functioning and ability to perform at work. She describes herself as a "wrorrier at times". She frequently feels scatter-brained and forgetful. She endorses generalized and ruminative worry about "wanting to run away." She struggles with feelings of restlessness "on edge, tense". She has noticed increased muscle tension in her neck and often clinches her jaw. Reports some fatigue, "I struggle to make it through the day sometimes". Endorses "anxiety attacks" due to a buildup of situational stress, but denies panic attacks.      Past Medical hx:   Past Medical History:   Diagnosis Date    Anemia     patient states has resilved    Hypertension     PONV (postoperative nausea and vomiting)     Thyroid disorder         Interim hx:  Medication changes last visit: none  Anxiety: inc'd  Depression: denies     Denies suicidal/homicidal ideations.  Denies hopelessness/worthlessness.    Denies auditory/visual hallucinations      Tobacco: never   Alcohol: 3-5 glasses wine weekly, social   Drug use: denies  Caffeine: 2 soft drinks " "daily  Review of Systems   · PSYCHIATRIC: Pertinent items are noted in the narrative.        CONSTITUTIONAL: weight stable        M/S: no pain today         ENT: no allergies noted today        ABD: no n/v/d     Past Medical, Family and Social History: The patient's past medical, family and social history have been reviewed and updated as appropriate within the electronic medical record. See encounter notes.     Medication: Zoloft 75 mg QD, Buspirone 10mg BID, Klonopin 0.5 mg QD PRN, Vistaril 25 mg QHS for sleep     Compliance: yes      Side effects: tolerates     Risk Parameters:  Patient reports no suicidal ideation  Patient reports no homicidal ideation  Patient reports no self-injurious behavior  Patient reports no violent behavior     Exam (detailed: at least 9 elements; comprehensive: all 15 elements)   Constitutional  Vitals:  Most recent vital signs, dated less than 90 days prior to this appointment, were reviewed. /81 (BP Location: Right arm, Patient Position: Sitting)   Pulse 71   Resp 16   Ht 5' 7" (1.702 m)   Wt 93.9 kg (207 lb 0.2 oz)   LMP 08/09/2015   BMI 32.42 kg/m²      General:  unremarkable, age appropriate, casual attire, good eye contact, good rapport       Musculoskeletal  Muscle Strength/Tone:  no flaccidity, no tremor    Gait & Station:  normal      Psychiatric                       Speech:  normal tone, normal rate, rhythm, and volume   Mood & Affect:   Euthymic, congruent, appropriate         Thought Process:   Goal directed; Linear    Associations:   intact   Thought Content:   No SI/HI, delusions, or paranoia, no AV/VH   Insight & Judgement:   Good, adequate to circumstances   Orientation:   grossly intact; alert and oriented x 4    Memory:  intact for content of interview    Language:  grossly intact, can repeat    Attention Span  : Grossly intact for content of interview   Fund of Knowledge:   intact and appropriate to age and level of education        Assessment and " "Diagnosis   Status/Progress: Based on the examination today, the patient's problem(s) is/are under fair control.  New problems have not been presented today. Comorbidities are not currently complicating management of the primary condition.      Impression:  Pt is a  with past psychiatric hx of XIANG who presents for follow up treatment. She is currently taking Zoloft 75 mg QD, Klonopin 0.5 mg QD, Buspirone 10mg BID, Vistaril 25 QHS.Since last visit, pt reports some continued generalized, ruminative worrying. Very easily overwhelmed. Attributes much of her stress to situational stressors. Sleeps around 6 hours each night, stating "it takes me hours to fall asleep". But once asleep, not difficulty staying asleep. "sometimes" feels rested upon waking. Energy good, improving. Has been going to the gym more often. Concentration good. Appetite increased. Denies panic attacks.    Denies any depressive symptoms since last visit.      Diagnosis: XIANG, mood disorder unspecified    Intervention/Counseling/Treatment Plan   · Medication Management:      1) Cont Zoloft 75 mg QD for anxiety. Discussed potential for GI side effects, sexual dysfunction, mood destabilization, headaches  2) Cont Klonopin to 0.5 mg QD PRN. Discussed risk of decreased RT, sedation, addictive potential, and not to mix with alcohol.  3)  Cont Buspirone 10 mg BID  4) Cont Vistaril 25 mg QHS for sleep      4. Call to report any worsening of symptoms or problems with the medication. Pt instructed to go to ER with thoughts of harming self, others     5. Patient given contact # for psychotherapists at [facility] and also instructed she may check with insurance for list of providers.      6. Labs: no new orders    Psychotherapy:   · Target symptoms: anxiety  · Why chosen therapy is appropriate versus another modality: relevant to diagnosis, patient responds to this modality  · Outcome monitoring methods: self-report, observation, feedback from family "   · Therapeutic intervention type: supportive psychotherapy  · Topics discussed/themes: building skills sets for symptom management, symptom recognition, nutrition, exercise  · The patient's response to the intervention is accepting. The patient's progress toward treatment goals is positive progress.  · Duration of intervention: 20 minutes     Return to clinic: 3 mo    -Spent 30min face to face with the pt; >50% time spent in counseling   -Supportive therapy and psychoeducation provided  -R/B/SE's of medications discussed with the pt who expresses understanding and chooses to take medications as prescribed.   -Pt instructed to call clinic, 911 or go to nearest emergency room if sxs worsen or pt is in   crisis. The pt expresses understanding.    Baljit Rubin, NP

## 2019-10-25 DIAGNOSIS — Z12.11 COLON CANCER SCREENING: ICD-10-CM

## 2019-10-30 ENCOUNTER — PATIENT OUTREACH (OUTPATIENT)
Dept: ADMINISTRATIVE | Facility: HOSPITAL | Age: 54
End: 2019-10-30

## 2019-11-07 ENCOUNTER — TELEPHONE (OUTPATIENT)
Dept: ADMINISTRATIVE | Facility: HOSPITAL | Age: 54
End: 2019-11-07

## 2019-11-07 DIAGNOSIS — I10 ESSENTIAL (PRIMARY) HYPERTENSION: ICD-10-CM

## 2019-11-08 ENCOUNTER — PATIENT OUTREACH (OUTPATIENT)
Dept: ADMINISTRATIVE | Facility: HOSPITAL | Age: 54
End: 2019-11-08

## 2019-11-08 RX ORDER — HYDROCHLOROTHIAZIDE 25 MG/1
TABLET ORAL
Qty: 90 TABLET | Refills: 1 | Status: SHIPPED | OUTPATIENT
Start: 2019-11-08 | End: 2020-05-25 | Stop reason: SDUPTHER

## 2019-11-23 ENCOUNTER — TELEPHONE (OUTPATIENT)
Dept: FAMILY MEDICINE | Facility: CLINIC | Age: 54
End: 2019-11-23

## 2019-11-25 RX ORDER — CLONAZEPAM 0.5 MG/1
TABLET ORAL
Qty: 30 TABLET | Refills: 0 | Status: SHIPPED | OUTPATIENT
Start: 2019-11-25 | End: 2019-12-19 | Stop reason: SDUPTHER

## 2019-12-02 ENCOUNTER — PATIENT MESSAGE (OUTPATIENT)
Dept: PSYCHIATRY | Facility: CLINIC | Age: 54
End: 2019-12-02

## 2019-12-02 DIAGNOSIS — F41.9 ANXIETY AND DEPRESSION: ICD-10-CM

## 2019-12-02 DIAGNOSIS — F32.A ANXIETY AND DEPRESSION: ICD-10-CM

## 2019-12-02 RX ORDER — SERTRALINE HYDROCHLORIDE 25 MG/1
25 TABLET, FILM COATED ORAL DAILY
Qty: 30 TABLET | Refills: 2 | Status: SHIPPED | OUTPATIENT
Start: 2019-12-02 | End: 2020-01-22

## 2019-12-02 RX ORDER — BUSPIRONE HYDROCHLORIDE 10 MG/1
10 TABLET ORAL 2 TIMES DAILY
Qty: 60 TABLET | Refills: 2 | Status: SHIPPED | OUTPATIENT
Start: 2019-12-02 | End: 2020-05-29 | Stop reason: SDUPTHER

## 2019-12-02 RX ORDER — SERTRALINE HYDROCHLORIDE 50 MG/1
50 TABLET, FILM COATED ORAL DAILY
Qty: 90 TABLET | Refills: 0 | Status: SHIPPED | OUTPATIENT
Start: 2019-12-02 | End: 2020-01-22

## 2019-12-19 ENCOUNTER — PATIENT MESSAGE (OUTPATIENT)
Dept: PSYCHIATRY | Facility: CLINIC | Age: 54
End: 2019-12-19

## 2019-12-19 RX ORDER — CLONAZEPAM 0.5 MG/1
TABLET ORAL
Qty: 30 TABLET | Refills: 0 | Status: SHIPPED | OUTPATIENT
Start: 2019-12-19 | End: 2020-01-22 | Stop reason: SDUPTHER

## 2020-01-21 ENCOUNTER — OFFICE VISIT (OUTPATIENT)
Dept: OBSTETRICS AND GYNECOLOGY | Facility: CLINIC | Age: 55
End: 2020-01-21
Payer: COMMERCIAL

## 2020-01-21 VITALS
SYSTOLIC BLOOD PRESSURE: 146 MMHG | DIASTOLIC BLOOD PRESSURE: 94 MMHG | BODY MASS INDEX: 32.98 KG/M2 | HEIGHT: 67 IN | WEIGHT: 210.13 LBS

## 2020-01-21 DIAGNOSIS — N95.0 PMB (POSTMENOPAUSAL BLEEDING): ICD-10-CM

## 2020-01-21 DIAGNOSIS — Z12.4 PAP SMEAR FOR CERVICAL CANCER SCREENING: Primary | ICD-10-CM

## 2020-01-21 PROCEDURE — 3077F SYST BP >= 140 MM HG: CPT | Mod: CPTII,S$GLB,, | Performed by: SPECIALIST

## 2020-01-21 PROCEDURE — 3077F PR MOST RECENT SYSTOLIC BLOOD PRESSURE >= 140 MM HG: ICD-10-PCS | Mod: CPTII,S$GLB,, | Performed by: SPECIALIST

## 2020-01-21 PROCEDURE — 3008F BODY MASS INDEX DOCD: CPT | Mod: CPTII,S$GLB,, | Performed by: SPECIALIST

## 2020-01-21 PROCEDURE — 99203 PR OFFICE/OUTPT VISIT, NEW, LEVL III, 30-44 MIN: ICD-10-PCS | Mod: S$GLB,,, | Performed by: SPECIALIST

## 2020-01-21 PROCEDURE — 99203 OFFICE O/P NEW LOW 30 MIN: CPT | Mod: S$GLB,,, | Performed by: SPECIALIST

## 2020-01-21 PROCEDURE — 3008F PR BODY MASS INDEX (BMI) DOCUMENTED: ICD-10-PCS | Mod: CPTII,S$GLB,, | Performed by: SPECIALIST

## 2020-01-21 PROCEDURE — 3080F PR MOST RECENT DIASTOLIC BLOOD PRESSURE >= 90 MM HG: ICD-10-PCS | Mod: CPTII,S$GLB,, | Performed by: SPECIALIST

## 2020-01-21 PROCEDURE — 3080F DIAST BP >= 90 MM HG: CPT | Mod: CPTII,S$GLB,, | Performed by: SPECIALIST

## 2020-01-21 PROCEDURE — 99999 PR PBB SHADOW E&M-EST. PATIENT-LVL III: CPT | Mod: PBBFAC,,, | Performed by: SPECIALIST

## 2020-01-21 PROCEDURE — 99999 PR PBB SHADOW E&M-EST. PATIENT-LVL III: ICD-10-PCS | Mod: PBBFAC,,, | Performed by: SPECIALIST

## 2020-01-21 PROCEDURE — 88175 CYTOPATH C/V AUTO FLUID REDO: CPT

## 2020-01-22 ENCOUNTER — OFFICE VISIT (OUTPATIENT)
Dept: PSYCHIATRY | Facility: CLINIC | Age: 55
End: 2020-01-22
Payer: COMMERCIAL

## 2020-01-22 VITALS
HEIGHT: 67 IN | SYSTOLIC BLOOD PRESSURE: 130 MMHG | WEIGHT: 211.5 LBS | DIASTOLIC BLOOD PRESSURE: 78 MMHG | BODY MASS INDEX: 33.19 KG/M2

## 2020-01-22 DIAGNOSIS — F41.1 GAD (GENERALIZED ANXIETY DISORDER): ICD-10-CM

## 2020-01-22 DIAGNOSIS — F39 UNSPECIFIED MOOD (AFFECTIVE) DISORDER: Primary | ICD-10-CM

## 2020-01-22 PROCEDURE — 3078F DIAST BP <80 MM HG: CPT | Mod: CPTII,S$GLB,, | Performed by: NURSE PRACTITIONER

## 2020-01-22 PROCEDURE — 99999 PR PBB SHADOW E&M-EST. PATIENT-LVL II: CPT | Mod: PBBFAC,,, | Performed by: NURSE PRACTITIONER

## 2020-01-22 PROCEDURE — 3075F SYST BP GE 130 - 139MM HG: CPT | Mod: CPTII,S$GLB,, | Performed by: NURSE PRACTITIONER

## 2020-01-22 PROCEDURE — 99999 PR PBB SHADOW E&M-EST. PATIENT-LVL II: ICD-10-PCS | Mod: PBBFAC,,, | Performed by: NURSE PRACTITIONER

## 2020-01-22 PROCEDURE — 3008F BODY MASS INDEX DOCD: CPT | Mod: CPTII,S$GLB,, | Performed by: NURSE PRACTITIONER

## 2020-01-22 PROCEDURE — 99214 OFFICE O/P EST MOD 30 MIN: CPT | Mod: S$GLB,,, | Performed by: NURSE PRACTITIONER

## 2020-01-22 PROCEDURE — 90833 PSYTX W PT W E/M 30 MIN: CPT | Mod: S$GLB,,, | Performed by: NURSE PRACTITIONER

## 2020-01-22 PROCEDURE — 90833 PR PSYCHOTHERAPY W/PATIENT W/E&M, 30 MIN (ADD ON): ICD-10-PCS | Mod: S$GLB,,, | Performed by: NURSE PRACTITIONER

## 2020-01-22 PROCEDURE — 99214 PR OFFICE/OUTPT VISIT, EST, LEVL IV, 30-39 MIN: ICD-10-PCS | Mod: S$GLB,,, | Performed by: NURSE PRACTITIONER

## 2020-01-22 PROCEDURE — 3078F PR MOST RECENT DIASTOLIC BLOOD PRESSURE < 80 MM HG: ICD-10-PCS | Mod: CPTII,S$GLB,, | Performed by: NURSE PRACTITIONER

## 2020-01-22 PROCEDURE — 3008F PR BODY MASS INDEX (BMI) DOCUMENTED: ICD-10-PCS | Mod: CPTII,S$GLB,, | Performed by: NURSE PRACTITIONER

## 2020-01-22 PROCEDURE — 3075F PR MOST RECENT SYSTOLIC BLOOD PRESS GE 130-139MM HG: ICD-10-PCS | Mod: CPTII,S$GLB,, | Performed by: NURSE PRACTITIONER

## 2020-01-22 RX ORDER — TRAZODONE HYDROCHLORIDE 50 MG/1
50 TABLET ORAL NIGHTLY
Qty: 30 TABLET | Refills: 1 | Status: SHIPPED | OUTPATIENT
Start: 2020-01-22 | End: 2020-03-23 | Stop reason: SDUPTHER

## 2020-01-22 RX ORDER — CLONAZEPAM 0.5 MG/1
TABLET ORAL
Qty: 30 TABLET | Refills: 0 | Status: SHIPPED | OUTPATIENT
Start: 2020-01-22 | End: 2020-02-21 | Stop reason: SDUPTHER

## 2020-01-22 RX ORDER — SERTRALINE HYDROCHLORIDE 100 MG/1
100 TABLET, FILM COATED ORAL DAILY
Qty: 30 TABLET | Refills: 2 | Status: SHIPPED | OUTPATIENT
Start: 2020-01-22 | End: 2020-05-29 | Stop reason: SDUPTHER

## 2020-01-22 NOTE — PROGRESS NOTES
"Outpatient Psychiatry Follow-Up Visit    Clinical Status of Patient: Outpatient (Ambulatory)  01/22/2020     Chief Complaint: Pt is a 53 year old female  who presents today for a follow-up. Met with patient.       Interval History and Content of Current Session:  Interim Events/Subjective Report/Content of Current Session:  follow up appointment.    Pt is a  with past psychiatric hx of XIANG who presents for follow up treatment. She is currently taking Zoloft 75 mg QD, Klonopin 0.5 mg QD PRN (uses daily), Buspirone 10mg BID, Vistaril 25 QHS PRN. Pt reports continued issues with sleep, both falling and staying asleep. Notes continued anxiety, and feels restless, especially in the evening. "Can't shut my brain off". Some depressive symptoms noted, but improved overall.    Past Psychiatric hx: Pt is a  53 year old female who established care with me 05/19. Referral from Dr. Guo who has been managing her psychiatric medications. She came to me taking Zoloft 50 mg QD, Buspirone 10mg BID, Xanax 0.25 mg QD PRN, Vistaril 25 mg QHS for sleep. During her initial eval, pet met criteria for XIANG and I increased her Zoloft to 75 mg QD. No other med changes were made. Since increasing Zoloft, pt reports an overall decrease in anxiety levels. She reports less generalized and ruminative worry. She states "anxiety attacks have really decreased." She recently went on a trip where she almost missed her flight, and handled this surprisingly well. Klonopin worked well initially, but she feels that this has lost effect with time.  Pt speaks to having achieved moderate results from these meds so far. Reports first being seen by psych in 2001 following a divorce, in which she was prescribed Paxil with good results.     Hx:  "I feel like my anxiety has been getting worse. I feel like I'm going to jump through my skin. I have a very high stress job that requires me to meet deadlines and perform, and the anxiety has been getting in the way " "of that"     Pt speaks to a long standing history of anxiety and some depression. She states "there probably have been some events that have made things worse." Pt reports recently ending a long-term relationship, while almost simultaneously discovering her father had two strokes. Also, during this time, her son came out as lovell. She has accepted this, although initially was a major stressor her. Notes periods of apathy, lack of motivation, low mood. Energy and concentration also dec'd during these times.     Pt's anxiety has started interfering with her daily functioning and ability to perform at work. She describes herself as a "wrorrier at times". She frequently feels scatter-brained and forgetful. She endorses generalized and ruminative worry about "wanting to run away." She struggles with feelings of restlessness "on edge, tense". She has noticed increased muscle tension in her neck and often clinches her jaw. Reports some fatigue, "I struggle to make it through the day sometimes". Endorses "anxiety attacks" due to a buildup of situational stress, but denies panic attacks.      Past Medical hx:   Past Medical History:   Diagnosis Date    Anemia     patient states has resilved    Hypertension     PONV (postoperative nausea and vomiting)     Thyroid disorder         Interim hx:  Medication changes last visit: none  Anxiety: inc'd  Depression: denies     Denies suicidal/homicidal ideations.  Denies hopelessness/worthlessness.    Denies auditory/visual hallucinations      Tobacco: never   Alcohol: 3-5 glasses wine weekly, social   Drug use: denies  Caffeine: 2 soft drinks daily  Review of Systems   · PSYCHIATRIC: Pertinent items are noted in the narrative.        CONSTITUTIONAL: weight stable        M/S: no pain today         ENT: no allergies noted today        ABD: no n/v/d     Past Medical, Family and Social History: The patient's past medical, family and social history have been reviewed and updated as " "appropriate within the electronic medical record. See encounter notes.     Medication: Zoloft 75 mg QD, Buspirone 10mg BID, Klonopin 0.5 mg QD PRN, Vistaril 25 mg QHS for sleep     Compliance: yes      Side effects: tolerates     Risk Parameters:  Patient reports no suicidal ideation  Patient reports no homicidal ideation  Patient reports no self-injurious behavior  Patient reports no violent behavior     Exam (detailed: at least 9 elements; comprehensive: all 15 elements)   Constitutional  Vitals:  Most recent vital signs, dated less than 90 days prior to this appointment, were reviewed. /78   Ht 5' 7" (1.702 m)   Wt 95.9 kg (211 lb 8.2 oz)   LMP 08/09/2015   BMI 33.13 kg/m²      General:  unremarkable, age appropriate, casual attire, good eye contact, good rapport       Musculoskeletal  Muscle Strength/Tone:  no flaccidity, no tremor    Gait & Station:  normal      Psychiatric                       Speech:  normal tone, normal rate, rhythm, and volume   Mood & Affect:   Euthymic, congruent, appropriate         Thought Process:   Goal directed; Linear    Associations:   intact   Thought Content:   No SI/HI, delusions, or paranoia, no AV/VH   Insight & Judgement:   Good, adequate to circumstances   Orientation:   grossly intact; alert and oriented x 4    Memory:  intact for content of interview    Language:  grossly intact, can repeat    Attention Span  : Grossly intact for content of interview   Fund of Knowledge:   intact and appropriate to age and level of education        Assessment and Diagnosis   Status/Progress: Based on the examination today, the patient's problem(s) is/are under fair control.  New problems have not been presented today. Comorbidities are not currently complicating management of the primary condition.      Impression:Pt is a  with past psychiatric hx of XIANG who presents for follow up treatment. She is currently taking Zoloft 75 mg QD, Klonopin 0.5 mg QD PRN (uses daily), Buspirone " "10mg BID, Vistaril 25 QHS PRN. Pt reports continued issues with sleep, both falling and staying asleep. Notes continued anxiety, and feels restless, especially in the evening. "Can't shut my brain off". Some depressive symptoms noted, but improved overall with zoloft titration.      Diagnosis: XIANG, mood disorder unspecified    Intervention/Counseling/Treatment Plan   · Medication Management:      1) Increase Zoloft to 100mg QD for anxiety. Discussed potential for GI side effects, sexual dysfunction, mood destabilization, headaches  2) Start Trazodone 50mg QHS for sleep.  3) Cont Klonopin to 0.5 mg QD PRN. Discussed risk of decreased RT, sedation, addictive potential, and not to mix with alcohol.  4)  Cont Buspirone 10 mg BID  5) Cont Vistaril 25 mg QHS for sleep      6. Call to report any worsening of symptoms or problems with the medication. Pt instructed to go to ER with thoughts of harming self, others     7. Patient given contact # for psychotherapists at [facility] and also instructed she may check with insurance for list of providers.      8. Labs: no new orders    Psychotherapy:   · Target symptoms: anxiety, depression  · Why chosen therapy is appropriate versus another modality: relevant to diagnosis, patient responds to this modality  · Outcome monitoring methods: self-report, observation, feedback from family   · Therapeutic intervention type: supportive psychotherapy  · Topics discussed/themes: building skills sets for symptom management, symptom recognition, nutrition, exercise  · The patient's response to the intervention is accepting. The patient's progress toward treatment goals is positive progress.  · Duration of intervention: 20 minutes     Return to clinic: 1 mo    -Spent 30min face to face with the pt; >50% time spent in counseling   -Supportive therapy and psychoeducation provided  -R/B/SE's of medications discussed with the pt who expresses understanding and chooses to take medications " as prescribed.   -Pt instructed to call clinic, 911 or go to nearest emergency room if sxs worsen or pt is in   crisis. The pt expresses understanding.    Baljit Rubin, NP

## 2020-01-22 NOTE — PROGRESS NOTES
55 yo WF presents for evaluation of PMB  Pt states amenorrheic x 2 years and then had episode vaginal bleeding recently lasting several days. Pt denies new med, activity, pain, weight loss/gain,c/d,trauma,dysuria, hematuria, HRT.  Past Medical History:   Diagnosis Date    Anemia     patient states has resilved    Hypertension     PONV (postoperative nausea and vomiting)     Thyroid disorder        Past Surgical History:   Procedure Laterality Date    AUGMENTATION OF BREAST Bilateral     implants with lift    BREAST SURGERY      lift with implants     SECTION      X2    DILATION AND CURETTAGE OF UTERUS      ESOPHAGOGASTRODUODENOSCOPY N/A 2018    Procedure: EGD (ESOPHAGOGASTRODUODENOSCOPY);  Surgeon: Junito Baird Jr., MD;  Location[facility];  Service: Endoscopy;  Laterality: N/A;    lapband  ~    (in [place])    LARYNGOSCOPY Right 3/21/2019    Procedure: LARYNGOSCOPY with biopsy lesion right pyriform sinus;  Surgeon: Kia Horowitz MD;  Location: [facility] OR;  Service: ENT;  Laterality: Right;    TUBAL LIGATION         Family History   Problem Relation Age of Onset    Cancer Father         thyroid cancer    Diabetes Father     Hypertension Father     Ovarian cancer Paternal Aunt     Breast cancer Neg Hx        Social History     Socioeconomic History    Marital status:      Spouse name: Not on file    Number of children: Not on file    Years of education: Not on file    Highest education level: Not on file   Occupational History    Not on file   Social Needs    Financial resource strain: Somewhat hard    Food insecurity:     Worry: Never true     Inability: Never true    Transportation needs:     Medical: No     Non-medical: No   Tobacco Use    Smoking status: Never Smoker    Smokeless tobacco: Never Used   Substance and Sexual Activity    Alcohol use: Yes     Alcohol/week: 5.0 standard drinks     Types: 5 Glasses of wine per week     Frequency: 2-3 times a  week     Drinks per session: 3 or 4     Comment: wine, beer    Drug use: No    Sexual activity: Yes     Partners: Male     Birth control/protection: Post-menopausal   Lifestyle    Physical activity:     Days per week: 5 days     Minutes per session: Not on file    Stress: Not on file   Relationships    Social connections:     Talks on phone: Not on file     Gets together: Once a week     Attends Cheondoism service: Not on file     Active member of club or organization: Patient refused     Attends meetings of clubs or organizations: Patient refused     Relationship status:    Other Topics Concern    Not on file   Social History Narrative    Not on file       Current Outpatient Medications   Medication Sig Dispense Refill    acetaminophen (TYLENOL EXTRA STRENGTH) 500 MG tablet Take 1,000 mg by mouth daily as needed. As directed on package      busPIRone (BUSPAR) 10 MG tablet Take 1 tablet (10 mg total) by mouth 2 (two) times daily. 60 tablet 2    clonazePAM (KLONOPIN) 0.5 MG tablet Take 1 tablet (0.5 mg total) by mouth daily as needed for anxiety. 30 tablet 0    hydroCHLOROthiazide (HYDRODIURIL) 25 MG tablet TAKE 1 TABLET BY MOUTH ONCE DAILY 90 tablet 1    biotin 1 mg tablet Take 1,000 mcg by mouth 3 (three) times daily.      fluticasone (FLONASE) 50 mcg/actuation nasal spray 1 spray (50 mcg total) by Each Nare route 2 (two) times daily. 1 Bottle 2    hydrOXYzine HCl (ATARAX) 25 MG tablet Take 1 tablet (25 mg total) by mouth every evening. 30 tablet 2     No current facility-administered medications for this visit.        Review of patient's allergies indicates:   Allergen Reactions    Penicillin v potassium Swelling       Review of System:   General: no chills, fever, night sweats, weight gain or weight loss  Psychological: no depression or suicidal ideation  Breasts: no new or changing breast lumps, nipple discharge or masses.  Respiratory: no cough, shortness of breath, or  wheezing  Cardiovascular: no chest pain or dyspnea on exertion  Gastrointestinal: no abdominal pain, change in bowel habits, or black or bloody stools  Genito-Urinary: no incontinence, urinary frequency/urgency or vulvar/vaginal symptoms, pelvic pain. NEW ONSET PMB  Musculoskeletal: no gait disturbance or muscular weakness                                              General Appearance    A and O x 4, Cooperative, no distress   Breasts    Abdomen   Symmetrical, no masses, no discharge, skin changes , erythema or retraction. No adenopathy  Soft, non-tender, bowel sounds active all four quadrants,  no masses, no organomegaly    Genitourinary:   External rectal exam shows no thrombosed external hemorrhoids.   Pelvic exam was performed with patient supine.  No labial fusion.  There is no rash, lesion or injury on the right labia.  There is no rash, lesion or injury on the left labia.  No bleeding and no signs of injury around the vaginal introitus, urethra is without lesions and well supported. The cervix is visualized with no discharge, lesions or friability.  No vaginal discharge found.  No significant Cystocele, Enterocele or rectocele, and uterus well supported.  Bimanual exam:  The urethra is normal to palpation and there are no palpable vaginal wall masses.  Uterus is not deviated, not enlarged, not fixed, normal shape and not tender.  Cervix exhibits no motion tenderness.   Right adnexum displays no mass and no tenderness.  Left adnexum displays no mass and no tenderness.   Extremities: Extremities normal, atraumatic, no cyanosis or edema                       PAP submitted    I discussed possible etiologies of new onset bleeding and rec pelvic u/s for baseline and EMT  Pending the above, possible EMB  Answered all questions and will follow  At the end of patient visit, nurse and MD both asked pt if any improvements needed in visit experience and asked whether any further pt questions or concerns.

## 2020-01-24 ENCOUNTER — HOSPITAL ENCOUNTER (OUTPATIENT)
Dept: RADIOLOGY | Facility: HOSPITAL | Age: 55
Discharge: HOME | End: 2020-01-24
Attending: SPECIALIST
Payer: COMMERCIAL

## 2020-01-24 DIAGNOSIS — N95.0 PMB (POSTMENOPAUSAL BLEEDING): ICD-10-CM

## 2020-01-24 PROCEDURE — 76830 US PELVIS COMP WITH TRANSVAG NON-OB (XPD): ICD-10-PCS | Mod: 26,,, | Performed by: RADIOLOGY

## 2020-01-24 PROCEDURE — 76856 US EXAM PELVIC COMPLETE: CPT | Mod: 26,,, | Performed by: RADIOLOGY

## 2020-01-24 PROCEDURE — 76856 US PELVIS COMP WITH TRANSVAG NON-OB (XPD): ICD-10-PCS | Mod: 26,,, | Performed by: RADIOLOGY

## 2020-01-24 PROCEDURE — 76830 TRANSVAGINAL US NON-OB: CPT | Mod: 26,,, | Performed by: RADIOLOGY

## 2020-01-24 PROCEDURE — 76830 TRANSVAGINAL US NON-OB: CPT | Mod: TC,PN

## 2020-01-27 ENCOUNTER — PATIENT MESSAGE (OUTPATIENT)
Dept: OBSTETRICS AND GYNECOLOGY | Facility: CLINIC | Age: 55
End: 2020-01-27

## 2020-02-04 ENCOUNTER — PATIENT OUTREACH (OUTPATIENT)
Dept: ADMINISTRATIVE | Facility: OTHER | Age: 55
End: 2020-02-04

## 2020-02-04 NOTE — PROGRESS NOTES
Chart reviewed. Care Everywhere updates requested. Immunizations not able to be reconciled.  updated.

## 2020-02-06 ENCOUNTER — OFFICE VISIT (OUTPATIENT)
Dept: OBSTETRICS AND GYNECOLOGY | Facility: CLINIC | Age: 55
End: 2020-02-06
Payer: COMMERCIAL

## 2020-02-06 VITALS
HEIGHT: 67 IN | SYSTOLIC BLOOD PRESSURE: 140 MMHG | BODY MASS INDEX: 33.21 KG/M2 | DIASTOLIC BLOOD PRESSURE: 84 MMHG | WEIGHT: 211.63 LBS

## 2020-02-06 DIAGNOSIS — N95.0 PMB (POSTMENOPAUSAL BLEEDING): Primary | ICD-10-CM

## 2020-02-06 PROCEDURE — 3079F DIAST BP 80-89 MM HG: CPT | Mod: CPTII,S$GLB,, | Performed by: SPECIALIST

## 2020-02-06 PROCEDURE — 3077F PR MOST RECENT SYSTOLIC BLOOD PRESSURE >= 140 MM HG: ICD-10-PCS | Mod: CPTII,S$GLB,, | Performed by: SPECIALIST

## 2020-02-06 PROCEDURE — 3008F PR BODY MASS INDEX (BMI) DOCUMENTED: ICD-10-PCS | Mod: CPTII,S$GLB,, | Performed by: SPECIALIST

## 2020-02-06 PROCEDURE — 3008F BODY MASS INDEX DOCD: CPT | Mod: CPTII,S$GLB,, | Performed by: SPECIALIST

## 2020-02-06 PROCEDURE — 99999 PR PBB SHADOW E&M-EST. PATIENT-LVL III: CPT | Mod: PBBFAC,,, | Performed by: SPECIALIST

## 2020-02-06 PROCEDURE — 99213 PR OFFICE/OUTPT VISIT, EST, LEVL III, 20-29 MIN: ICD-10-PCS | Mod: S$GLB,,, | Performed by: SPECIALIST

## 2020-02-06 PROCEDURE — 99213 OFFICE O/P EST LOW 20 MIN: CPT | Mod: S$GLB,,, | Performed by: SPECIALIST

## 2020-02-06 PROCEDURE — 99999 PR PBB SHADOW E&M-EST. PATIENT-LVL III: ICD-10-PCS | Mod: PBBFAC,,, | Performed by: SPECIALIST

## 2020-02-06 PROCEDURE — 3079F PR MOST RECENT DIASTOLIC BLOOD PRESSURE 80-89 MM HG: ICD-10-PCS | Mod: CPTII,S$GLB,, | Performed by: SPECIALIST

## 2020-02-06 PROCEDURE — 3077F SYST BP >= 140 MM HG: CPT | Mod: CPTII,S$GLB,, | Performed by: SPECIALIST

## 2020-02-10 NOTE — PROGRESS NOTES
55 yo WF returns for recommended EMB after episode of PMB  After amenorrhea x approx 2 years.   Past Medical History:   Diagnosis Date    Anemia     patient states has resilved    Hypertension     PONV (postoperative nausea and vomiting)     Thyroid disorder        Past Surgical History:   Procedure Laterality Date    AUGMENTATION OF BREAST Bilateral     implants with lift    BREAST SURGERY      lift with implants     SECTION      X2    DILATION AND CURETTAGE OF UTERUS      ESOPHAGOGASTRODUODENOSCOPY N/A 2018    Procedure: EGD (ESOPHAGOGASTRODUODENOSCOPY);  Surgeon: Junito Baird Jr., MD;  [facility] ENDO;  Service: Endoscopy;  Laterality: N/A;    lapband  ~    (in [place])    LARYNGOSCOPY Right 3/21/2019    Procedure: LARYNGOSCOPY with biopsy lesion right pyriform sinus;  Surgeon: Kia Horowitz MD;  Location: [facility] OR;  Service: ENT;  Laterality: Right;    TUBAL LIGATION         Family History   Problem Relation Age of Onset    Cancer Father         thyroid cancer    Diabetes Father     Hypertension Father     Ovarian cancer Paternal Aunt     Breast cancer Neg Hx        Social History     Socioeconomic History    Marital status:      Spouse name: Not on file    Number of children: Not on file    Years of education: Not on file    Highest education level: Not on file   Occupational History    Not on file   Social Needs    Financial resource strain: Somewhat hard    Food insecurity:     Worry: Never true     Inability: Never true    Transportation needs:     Medical: No     Non-medical: No   Tobacco Use    Smoking status: Never Smoker    Smokeless tobacco: Never Used   Substance and Sexual Activity    Alcohol use: Yes     Alcohol/week: 5.0 standard drinks     Types: 5 Glasses of wine per week     Frequency: 2-3 times a week     Drinks per session: 3 or 4     Comment: wine, beer    Drug use: No    Sexual activity: Yes     Partners: Male     Birth  control/protection: Post-menopausal   Lifestyle    Physical activity:     Days per week: 5 days     Minutes per session: Not on file    Stress: Not on file   Relationships    Social connections:     Talks on phone: Not on file     Gets together: Once a week     Attends Catholic service: Not on file     Active member of club or organization: Patient refused     Attends meetings of clubs or organizations: Patient refused     Relationship status:    Other Topics Concern    Not on file   Social History Narrative    Not on file       Current Outpatient Medications   Medication Sig Dispense Refill    acetaminophen (TYLENOL EXTRA STRENGTH) 500 MG tablet Take 1,000 mg by mouth daily as needed. As directed on package      biotin 1 mg tablet Take 1,000 mcg by mouth 3 (three) times daily.      busPIRone (BUSPAR) 10 MG tablet Take 1 tablet (10 mg total) by mouth 2 (two) times daily. 60 tablet 2    clonazePAM (KLONOPIN) 0.5 MG tablet Take 1 tablet (0.5 mg total) by mouth daily as needed for anxiety. 30 tablet 0    fluticasone (FLONASE) 50 mcg/actuation nasal spray 1 spray (50 mcg total) by Each Nare route 2 (two) times daily. 1 Bottle 2    hydroCHLOROthiazide (HYDRODIURIL) 25 MG tablet TAKE 1 TABLET BY MOUTH ONCE DAILY 90 tablet 1    hydrOXYzine HCl (ATARAX) 25 MG tablet Take 1 tablet (25 mg total) by mouth every evening. 30 tablet 2    sertraline (ZOLOFT) 100 MG tablet Take 1 tablet (100 mg total) by mouth once daily. 30 tablet 2    traZODone (DESYREL) 50 MG tablet Take 1 tablet (50 mg total) by mouth every evening. 30 tablet 1     No current facility-administered medications for this visit.        Review of patient's allergies indicates:   Allergen Reactions    Penicillin v potassium Swelling       Review of System:   General: no chills, fever, night sweats, weight gain or weight loss  Psychological: no depression or suicidal ideation  Breasts: no new or changing breast lumps, nipple discharge or  masses.  Respiratory: no cough, shortness of breath, or wheezing  Cardiovascular: no chest pain or dyspnea on exertion  Gastrointestinal: no abdominal pain, change in bowel habits, or black or bloody stools  Genito-Urinary: no incontinence, urinary frequency/urgency or vulvar/vaginal symptoms, pelvic pain. POSITIVE PMB  Musculoskeletal: no gait disturbance or muscular weakness    Procedure    ENDOMETRIAL BIOPSY: NOTE   STENOTIC CERVICAL OPENING AND EMB WAS ATTEMPTED BUT UNSUCCESSFUL  She was counseled on the risks, benefits and alternatives to endometrial biopsy and agrees to proceed. She was specificly informed of the risk of bleeding, infection, uterine perforation and pain. Pippele EB done without complication. uterus sounded [No_of_CMS] cm.      I discussed unlikley pathology and due to stenotic cervix, I rec following pt with serial u/s to assess any change in EMT  If change does occur or recurent episode or bleeding, then rec outpt Fractional D and C    Answered all questions   Will follow

## 2020-02-11 LAB
FINAL PATHOLOGIC DIAGNOSIS: NORMAL
Lab: NORMAL

## 2020-02-21 RX ORDER — CLONAZEPAM 0.5 MG/1
TABLET ORAL
Qty: 30 TABLET | Refills: 0 | Status: SHIPPED | OUTPATIENT
Start: 2020-02-21 | End: 2020-03-23 | Stop reason: SDUPTHER

## 2020-03-23 RX ORDER — CLONAZEPAM 0.5 MG/1
TABLET ORAL
Qty: 30 TABLET | Refills: 0 | Status: SHIPPED | OUTPATIENT
Start: 2020-03-23 | End: 2020-04-23 | Stop reason: SDUPTHER

## 2020-03-23 RX ORDER — TRAZODONE HYDROCHLORIDE 50 MG/1
50 TABLET ORAL NIGHTLY
Qty: 30 TABLET | Refills: 1 | Status: SHIPPED | OUTPATIENT
Start: 2020-03-23 | End: 2020-05-29 | Stop reason: SDUPTHER

## 2020-04-09 ENCOUNTER — PATIENT MESSAGE (OUTPATIENT)
Dept: PSYCHIATRY | Facility: CLINIC | Age: 55
End: 2020-04-09

## 2020-04-20 ENCOUNTER — OFFICE VISIT (OUTPATIENT)
Dept: PSYCHIATRY | Facility: CLINIC | Age: 55
End: 2020-04-20
Payer: COMMERCIAL

## 2020-04-20 DIAGNOSIS — F41.1 GAD (GENERALIZED ANXIETY DISORDER): Primary | ICD-10-CM

## 2020-04-20 DIAGNOSIS — F39 UNSPECIFIED MOOD (AFFECTIVE) DISORDER: ICD-10-CM

## 2020-04-20 PROCEDURE — 99213 PR OFFICE/OUTPT VISIT, EST, LEVL III, 20-29 MIN: ICD-10-PCS | Mod: 95,,, | Performed by: NURSE PRACTITIONER

## 2020-04-20 PROCEDURE — 99213 OFFICE O/P EST LOW 20 MIN: CPT | Mod: 95,,, | Performed by: NURSE PRACTITIONER

## 2020-04-20 NOTE — PROGRESS NOTES
Outpatient Psychiatry Follow-Up Visit    Clinical Status of Patient: Outpatient (Virtual)  04/20/2020     The patient location is: [address] [place] [id number]  [phone] (Y)  The patient location [place] is:  [place]  The patient phone number is:[phone] (K)  Visit type: Virtual visit with synchronous audio and video  Each patient to whom he or she provides medical services by telemedicine is:  (1) informed of the relationship between the physician and patient and the respective role of any other health care provider with respect to management of the patient; and (2) notified that he or she may decline to receive medical services by telemedicine and may withdraw from such care at any time.       Chief Complaint: Pt is a 53 year old female  who presents today for a follow-up. Met with patient.       Interval History and Content of Current Session:  Interim Events/Subjective Report/Content of Current Session:  follow up appointment.    Pt is a 53 yo female  with past psychiatric hx of XIANG, mood disorder NOS who presents for follow up treatment. She is currently taking Zoloft 100 mg QD (increase from 75mg qd in 1/20), Klonopin 0.5 mg QD PRN (uses daily), Buspirone 10mg BID, Vistaril 25 QHS PRN, Trazodone 50mg QHS (started during her visit 1/20). Tolerates meds well. Pt reports good management of symptoms despite stress of covid-19. Sleeping well overall, good appetite. No major decompensations between sessions.       Past Psychiatric hx: Pt is a  53 year old female who established care with me 05/19. Referral from Dr. Guo who has been managing her psychiatric medications. She came to me taking Zoloft 50 mg QD, Buspirone 10mg BID, Xanax 0.25 mg QD PRN, Vistaril 25 mg QHS for sleep. During her initial eval, pet met criteria for XIANG and I increased her Zoloft to 75 mg QD. No other med changes were made. Since increasing Zoloft, pt reports an overall decrease in anxiety levels. She reports less  "generalized and ruminative worry. She states "anxiety attacks have really decreased." She recently went on a trip where she almost missed her flight, and handled this surprisingly well. Klonopin worked well initially, but she feels that this has lost effect with time.  Pt speaks to having achieved moderate results from these meds so far. Reports first being seen by psych in 2001 following a divorce, in which she was prescribed Paxil with good results.     Hx:  "I feel like my anxiety has been getting worse. I feel like I'm going to jump through my skin. I have a very high stress job that requires me to meet deadlines and perform, and the anxiety has been getting in the way of that"     Pt speaks to a long standing history of anxiety and some depression. She states "there probably have been some events that have made things worse." Pt reports recently ending a long-term relationship, while almost simultaneously discovering her father had two strokes. Also, during this time, her son came out as lovell. She has accepted this, although initially was a major stressor her. Notes periods of apathy, lack of motivation, low mood. Energy and concentration also dec'd during these times.     Pt's anxiety has started interfering with her daily functioning and ability to perform at work. She describes herself as a "wrorrier at times". She frequently feels scatter-brained and forgetful. She endorses generalized and ruminative worry about "wanting to run away." She struggles with feelings of restlessness "on edge, tense". She has noticed increased muscle tension in her neck and often clinches her jaw. Reports some fatigue, "I struggle to make it through the day sometimes". Endorses "anxiety attacks" due to a buildup of situational stress, but denies panic attacks.      Past Medical hx:   Past Medical History:   Diagnosis Date    Anemia     patient states has resilved    Hypertension     PONV (postoperative nausea and vomiting)     " Thyroid disorder         Interim hx:  Medication changes last visit: increase zoloft to 100mg qd, start trazodone 50mg qhs  Anxiety: inc'd  Depression: denies     Denies suicidal/homicidal ideations.  Denies hopelessness/worthlessness.    Denies auditory/visual hallucinations      Tobacco: never   Alcohol: 3-5 glasses wine weekly, social   Drug use: denies  Caffeine: 2 soft drinks daily  Review of Systems   · PSYCHIATRIC: Pertinent items are noted in the narrative.        CONSTITUTIONAL: weight stable        M/S: no pain today         ENT: no allergies noted today        ABD: no n/v/d     Past Medical, Family and Social History: The patient's past medical, family and social history have been reviewed and updated as appropriate within the electronic medical record. See encounter notes.     Medication: Zoloft 75 mg QD, Buspirone 10mg BID, Klonopin 0.5 mg QD PRN, Vistaril 25 mg QHS for sleep     Compliance: yes      Side effects: tolerates     Risk Parameters:  Patient reports no suicidal ideation  Patient reports no homicidal ideation  Patient reports no self-injurious behavior  Patient reports no violent behavior     Exam (detailed: at least 9 elements; comprehensive: all 15 elements)   Constitutional  Vitals:  Most recent vital signs, dated less than 90 days prior to this appointment, were reviewed.   General:  unremarkable, age appropriate, casual attire, good eye contact, good rapport       Musculoskeletal  Muscle Strength/Tone:  no flaccidity, no tremor    Gait & Station:  normal      Psychiatric                       Speech:  normal tone, normal rate, rhythm, and volume   Mood & Affect:   Euthymic, congruent, appropriate         Thought Process:   Goal directed; Linear    Associations:   intact   Thought Content:   No SI/HI, delusions, or paranoia, no AV/VH   Insight & Judgement:   Good, adequate to circumstances   Orientation:   grossly intact; alert and oriented x 4    Memory:  intact for content of interview     Language:  grossly intact, can repeat    Attention Span  : Grossly intact for content of interview   Fund of Knowledge:   intact and appropriate to age and level of education        Assessment and Diagnosis   Status/Progress: Based on the examination today, the patient's problem(s) is/are under fair control.  New problems have not been presented today. Comorbidities are not currently complicating management of the primary condition.      Impression:Pt is a 53 yo female  with past psychiatric hx of XIANG, mood disorder NOS who presents for follow up treatment. She is currently taking Zoloft 100 mg QD (increase from 75mg qd in 1/20), Klonopin 0.5 mg QD PRN (uses daily), Buspirone 10mg BID, Vistaril 25 QHS PRN, Trazodone 50mg QHS (started during her visit 1/20). Tolerates meds well. Pt reports good management of symptoms    Diagnosis: XIANG, mood disorder unspecified    Intervention/Counseling/Treatment Plan   · Medication Management:      1) Cont Zoloft 100mg qd. Discussed potential for GI side effects, sexual dysfunction, mood destabilization, headaches  2) ContTrazodone 50mg QHS for sleep.  3) Cont Klonopin to 0.5 mg QD PRN. Discussed risk of decreased RT, sedation, addictive potential, and not to mix with alcohol.  4)  Cont Buspirone 10 mg BID  5) Cont Vistaril 25 mg QHS for sleep      6. Call to report any worsening of symptoms or problems with the medication. Pt instructed to go to ER with thoughts of harming self, others     7. Patient given contact # for psychotherapists at [facility], [place] and also instructed she may check with insurance for list of providers.      8. Labs: no new orders    Psychotherapy:   · Target symptoms: anxiety, depression  · Why chosen therapy is appropriate versus another modality: relevant to diagnosis, patient responds to this modality  · Outcome monitoring methods: self-report, observation, feedback from family   · Therapeutic intervention type: supportive psychotherapy  · Topics  discussed/themes: building skills sets for symptom management, symptom recognition, nutrition, exercise  · The patient's response to the intervention is accepting. The patient's progress toward treatment goals is positive progress.  · Duration of intervention: 20 minutes     Return to clinic: 1 mo    -Spent 30min face to face with the pt; >50% time spent in counseling   -Supportive therapy and psychoeducation provided  -R/B/SE's of medications discussed with the pt who expresses understanding and chooses to take medications as prescribed.   -Pt instructed to call clinic, 911 or go to nearest emergency room if sxs worsen or pt is in   crisis. The pt expresses understanding.    Baljit Rubin, NP

## 2020-04-23 RX ORDER — CLONAZEPAM 0.5 MG/1
TABLET ORAL
Qty: 30 TABLET | Refills: 0 | Status: SHIPPED | OUTPATIENT
Start: 2020-04-23 | End: 2020-05-29 | Stop reason: SDUPTHER

## 2020-05-05 ENCOUNTER — PATIENT MESSAGE (OUTPATIENT)
Dept: ADMINISTRATIVE | Facility: HOSPITAL | Age: 55
End: 2020-05-05

## 2020-05-08 ENCOUNTER — TELEPHONE (OUTPATIENT)
Dept: PSYCHIATRY | Facility: CLINIC | Age: 55
End: 2020-05-08

## 2020-05-25 DIAGNOSIS — I10 ESSENTIAL (PRIMARY) HYPERTENSION: ICD-10-CM

## 2020-05-25 DIAGNOSIS — Z12.11 SCREENING FOR COLON CANCER: Primary | ICD-10-CM

## 2020-05-25 RX ORDER — HYDROCHLOROTHIAZIDE 25 MG/1
25 TABLET ORAL DAILY
Qty: 90 TABLET | Refills: 1 | Status: SHIPPED | OUTPATIENT
Start: 2020-05-25 | End: 2020-07-28 | Stop reason: SDUPTHER

## 2020-05-29 DIAGNOSIS — F32.A ANXIETY AND DEPRESSION: ICD-10-CM

## 2020-05-29 DIAGNOSIS — F41.9 ANXIETY AND DEPRESSION: ICD-10-CM

## 2020-05-29 RX ORDER — SERTRALINE HYDROCHLORIDE 100 MG/1
100 TABLET, FILM COATED ORAL DAILY
Qty: 30 TABLET | Refills: 2 | Status: SHIPPED | OUTPATIENT
Start: 2020-05-29 | End: 2020-07-13

## 2020-05-29 RX ORDER — TRAZODONE HYDROCHLORIDE 50 MG/1
50 TABLET ORAL NIGHTLY
Qty: 30 TABLET | Refills: 1 | Status: SHIPPED | OUTPATIENT
Start: 2020-05-29 | End: 2020-08-13

## 2020-05-29 RX ORDER — BUSPIRONE HYDROCHLORIDE 10 MG/1
10 TABLET ORAL 2 TIMES DAILY
Qty: 60 TABLET | Refills: 2 | Status: SHIPPED | OUTPATIENT
Start: 2020-05-29 | End: 2020-09-10 | Stop reason: SDUPTHER

## 2020-08-13 ENCOUNTER — LAB VISIT (OUTPATIENT)
Dept: PRIMARY CARE CLINIC | Facility: OTHER | Age: 55
End: 2020-08-13
Attending: INTERNAL MEDICINE
Payer: COMMERCIAL

## 2020-08-13 ENCOUNTER — OFFICE VISIT (OUTPATIENT)
Dept: FAMILY MEDICINE | Facility: CLINIC | Age: 55
End: 2020-08-13
Payer: COMMERCIAL

## 2020-08-13 ENCOUNTER — HOSPITAL ENCOUNTER (OUTPATIENT)
Dept: RADIOLOGY | Facility: HOSPITAL | Age: 55
Discharge: HOME | End: 2020-08-13
Attending: SPECIALIST
Payer: COMMERCIAL

## 2020-08-13 VITALS
BODY MASS INDEX: 34.67 KG/M2 | HEIGHT: 67 IN | SYSTOLIC BLOOD PRESSURE: 134 MMHG | OXYGEN SATURATION: 98 % | WEIGHT: 220.88 LBS | HEART RATE: 82 BPM | TEMPERATURE: 98 F | DIASTOLIC BLOOD PRESSURE: 86 MMHG

## 2020-08-13 DIAGNOSIS — I10 ESSENTIAL (PRIMARY) HYPERTENSION: Primary | ICD-10-CM

## 2020-08-13 DIAGNOSIS — Z12.39 SCREENING FOR BREAST CANCER: ICD-10-CM

## 2020-08-13 DIAGNOSIS — R05.9 COUGH: ICD-10-CM

## 2020-08-13 DIAGNOSIS — F39 UNSPECIFIED MOOD (AFFECTIVE) DISORDER: ICD-10-CM

## 2020-08-13 DIAGNOSIS — N95.0 PMB (POSTMENOPAUSAL BLEEDING): ICD-10-CM

## 2020-08-13 DIAGNOSIS — E66.9 CLASS 1 OBESITY: ICD-10-CM

## 2020-08-13 DIAGNOSIS — Z03.818 ENCOUNTER FOR OBSERVATION FOR SUSPECTED EXPOSURE TO OTHER BIOLOGICAL AGENTS RULED OUT: ICD-10-CM

## 2020-08-13 DIAGNOSIS — F41.1 GAD (GENERALIZED ANXIETY DISORDER): ICD-10-CM

## 2020-08-13 DIAGNOSIS — T78.40XA ALLERGIC STATE, INITIAL ENCOUNTER: ICD-10-CM

## 2020-08-13 DIAGNOSIS — Z11.59 ENCOUNTER FOR HEPATITIS C SCREENING TEST FOR LOW RISK PATIENT: ICD-10-CM

## 2020-08-13 PROBLEM — E66.811 CLASS 1 OBESITY: Status: ACTIVE | Noted: 2020-08-13

## 2020-08-13 PROCEDURE — 76830 TRANSVAGINAL US NON-OB: CPT | Mod: TC,PO

## 2020-08-13 PROCEDURE — 76830 TRANSVAGINAL US NON-OB: CPT | Mod: 26,,, | Performed by: RADIOLOGY

## 2020-08-13 PROCEDURE — 3079F DIAST BP 80-89 MM HG: CPT | Mod: CPTII,S$GLB,, | Performed by: INTERNAL MEDICINE

## 2020-08-13 PROCEDURE — 76830 US PELVIS COMP WITH TRANSVAG NON-OB (XPD): ICD-10-PCS | Mod: 26,,, | Performed by: RADIOLOGY

## 2020-08-13 PROCEDURE — 3075F SYST BP GE 130 - 139MM HG: CPT | Mod: CPTII,S$GLB,, | Performed by: INTERNAL MEDICINE

## 2020-08-13 PROCEDURE — 76856 US EXAM PELVIC COMPLETE: CPT | Mod: 26,,, | Performed by: RADIOLOGY

## 2020-08-13 PROCEDURE — 3079F PR MOST RECENT DIASTOLIC BLOOD PRESSURE 80-89 MM HG: ICD-10-PCS | Mod: CPTII,S$GLB,, | Performed by: INTERNAL MEDICINE

## 2020-08-13 PROCEDURE — 3008F BODY MASS INDEX DOCD: CPT | Mod: CPTII,S$GLB,, | Performed by: INTERNAL MEDICINE

## 2020-08-13 PROCEDURE — 76856 US PELVIS COMP WITH TRANSVAG NON-OB (XPD): ICD-10-PCS | Mod: 26,,, | Performed by: RADIOLOGY

## 2020-08-13 PROCEDURE — 99214 PR OFFICE/OUTPT VISIT, EST, LEVL IV, 30-39 MIN: ICD-10-PCS | Mod: S$GLB,,, | Performed by: INTERNAL MEDICINE

## 2020-08-13 PROCEDURE — 3008F PR BODY MASS INDEX (BMI) DOCUMENTED: ICD-10-PCS | Mod: CPTII,S$GLB,, | Performed by: INTERNAL MEDICINE

## 2020-08-13 PROCEDURE — 99214 OFFICE O/P EST MOD 30 MIN: CPT | Mod: S$GLB,,, | Performed by: INTERNAL MEDICINE

## 2020-08-13 PROCEDURE — 99999 PR PBB SHADOW E&M-EST. PATIENT-LVL III: ICD-10-PCS | Mod: PBBFAC,,, | Performed by: INTERNAL MEDICINE

## 2020-08-13 PROCEDURE — U0003 INFECTIOUS AGENT DETECTION BY NUCLEIC ACID (DNA OR RNA); SEVERE ACUTE RESPIRATORY SYNDROME CORONAVIRUS 2 (SARS-COV-2) (CORONAVIRUS DISEASE [COVID-19]), AMPLIFIED PROBE TECHNIQUE, MAKING USE OF HIGH THROUGHPUT TECHNOLOGIES AS DESCRIBED BY CMS-2020-01-R: HCPCS

## 2020-08-13 PROCEDURE — 99999 PR PBB SHADOW E&M-EST. PATIENT-LVL III: CPT | Mod: PBBFAC,,, | Performed by: INTERNAL MEDICINE

## 2020-08-13 PROCEDURE — 3075F PR MOST RECENT SYSTOLIC BLOOD PRESS GE 130-139MM HG: ICD-10-PCS | Mod: CPTII,S$GLB,, | Performed by: INTERNAL MEDICINE

## 2020-08-13 RX ORDER — MONTELUKAST SODIUM 10 MG/1
10 TABLET ORAL NIGHTLY
Qty: 30 TABLET | Refills: 2 | Status: SHIPPED | OUTPATIENT
Start: 2020-08-13 | End: 2020-09-12

## 2020-08-13 RX ORDER — BENZONATATE 100 MG/1
100 CAPSULE ORAL 3 TIMES DAILY PRN
Qty: 30 CAPSULE | Refills: 0 | Status: SHIPPED | OUTPATIENT
Start: 2020-08-13 | End: 2020-08-23

## 2020-08-13 RX ORDER — HYDROCHLOROTHIAZIDE 25 MG/1
25 TABLET ORAL DAILY
Qty: 90 TABLET | Refills: 1 | Status: SHIPPED | OUTPATIENT
Start: 2020-08-13 | End: 2021-07-03

## 2020-08-13 RX ORDER — PHENTERMINE HYDROCHLORIDE 37.5 MG/1
37.5 TABLET ORAL
Qty: 30 TABLET | Refills: 0 | Status: SHIPPED | OUTPATIENT
Start: 2020-08-13 | End: 2020-09-12

## 2020-08-13 NOTE — PROGRESS NOTES
Subjective:       Patient ID: Eugenie Najera is a 54 y.o. female.    Chief Complaint: Annual Exam      Social Hx:  From [place]. Living in /[place].  in 2012. 2 children. .     HPI:  5 weeks of sore throat, cough, congestion, coughing up sputum. She starting staying with BF in [place] at that time. He has a [place]. As soon as she came back (last night) she started feeling better. While in [place] she she had virtual visit with NP, dx with bronchitis and given z pack. Seems like it made it somewhat better. Taking Niquil. Cough is most bothersome issue. She tried 2nd gen antihist.   -Could be dog allergy  -will trial singulare  -will give tessalon for cough   -trial different 2nd gen antihist.     She is also interested in diet pills.   -She was on adipex in the past  -Discussed diet and exercise.     PMH and A/P:   Hypertension:  Hydrochlorothiazide  -controlled.   Generalized anxiety disorder and mood disorder NOS:  Zoloft, BuSpar, p.r.n. clonazepam  -stable  Class 1 obesity:  -start adipex  -diet and exercise  Health maintenance:  -hepatitis-C screening  -HIV screen  -pneumonia vaccine  -shingles vaccine  -mammogram    Lipid panel, CMP, CBC, hepatitis-C when available.  Mammogram  Current Outpatient Medications on File Prior to Visit   Medication Sig Dispense Refill    acetaminophen (TYLENOL EXTRA STRENGTH) 500 MG tablet Take 1,000 mg by mouth daily as needed. As directed on package      biotin 1 mg tablet Take 1,000 mcg by mouth 3 (three) times daily.      busPIRone (BUSPAR) 10 MG tablet Take 1 tablet (10 mg total) by mouth 2 (two) times daily. 60 tablet 2    sertraline (ZOLOFT) 100 MG tablet Take 1 tablet by mouth once daily 30 tablet 0    [DISCONTINUED] clonazePAM (KLONOPIN) 0.5 MG tablet Take 1 tablet (0.5 mg total) by mouth daily as needed for anxiety. (Patient not taking: Reported on 8/13/2020) 30 tablet 0    [DISCONTINUED] fluticasone (FLONASE) 50 mcg/actuation nasal spray  1 spray (50 mcg total) by Each Nare route 2 (two) times daily. (Patient not taking: Reported on 8/13/2020) 1 Bottle 2    [DISCONTINUED] hydroCHLOROthiazide (HYDRODIURIL) 25 MG tablet Take 1 tablet (25 mg total) by mouth once daily. (Patient not taking: Reported on 8/13/2020) 90 tablet 1    [DISCONTINUED] hydrOXYzine HCl (ATARAX) 25 MG tablet Take 1 tablet (25 mg total) by mouth every evening. (Patient not taking: Reported on 8/13/2020) 30 tablet 2    [DISCONTINUED] traZODone (DESYREL) 50 MG tablet Take 1 tablet (50 mg total) by mouth every evening. (Patient not taking: Reported on 8/13/2020) 30 tablet 1     No current facility-administered medications on file prior to visit.      I personally reviewed past medical, family and social history.  Review of Systems   Constitutional: Negative for activity change and fever.   HENT: Negative for sore throat and trouble swallowing.    Eyes: Negative for pain and visual disturbance.   Respiratory: Positive for cough. Negative for shortness of breath and wheezing.    Cardiovascular: Negative for chest pain, palpitations and leg swelling.   Gastrointestinal: Negative for abdominal pain, blood in stool, diarrhea, nausea and vomiting.   Endocrine: Negative for cold intolerance and polyuria.   Genitourinary: Negative for decreased urine volume and dysuria.   Musculoskeletal: Negative for gait problem and neck pain.   Skin: Negative for rash.   Neurological: Negative for dizziness, syncope and light-headedness.   Psychiatric/Behavioral: Negative for dysphoric mood. The patient is not nervous/anxious.          Objective:     Vitals:    08/13/20 1529   BP: 134/86   Pulse: 82   Temp: 98.4 °F (36.9 °C)        Physical Exam  Constitutional:       General: She is not in acute distress.     Appearance: She is well-developed.   HENT:      Head: Normocephalic and atraumatic.   Eyes:      Pupils: Pupils are equal, round, and reactive to light.   Neck:      Musculoskeletal: Neck supple.       Thyroid: No thyromegaly.   Cardiovascular:      Rate and Rhythm: Normal rate and regular rhythm.      Heart sounds: Normal heart sounds. No murmur. No friction rub. No gallop.    Pulmonary:      Effort: Pulmonary effort is normal. No respiratory distress.      Breath sounds: Normal breath sounds. No wheezing.   Abdominal:      General: Bowel sounds are normal.      Palpations: Abdomen is soft.      Tenderness: There is no abdominal tenderness.   Skin:     General: Skin is warm.      Findings: No rash.   Neurological:      Mental Status: She is alert and oriented to person, place, and time.      Cranial Nerves: No cranial nerve deficit.   Psychiatric:         Behavior: Behavior normal.           Assessment/Plan   Eugenie was seen today for annual exam.    Diagnoses and all orders for this visit:    Essential (primary) hypertension  -     hydroCHLOROthiazide (HYDRODIURIL) 25 MG tablet; Take 1 tablet (25 mg total) by mouth once daily.    Encounter for hepatitis C screening test for low risk patient  -     Cancel: Hepatitis C Antibody; Future    Screening for breast cancer  -     Mammo Digital Screening Bilat; Future    XIANG (generalized anxiety disorder)    Unspecified mood (affective) disorder    Class 1 obesity  -     phentermine (ADIPEX-P) 37.5 mg tablet; Take 1 tablet (37.5 mg total) by mouth before breakfast.    Allergic state, initial encounter  -     montelukast (SINGULAIR) 10 mg tablet; Take 1 tablet (10 mg total) by mouth every evening.    Cough  -     benzonatate (TESSALON) 100 MG capsule; Take 1 capsule (100 mg total) by mouth 3 (three) times daily as needed for Cough.

## 2020-08-14 DIAGNOSIS — E78.1 HYPERTRIGLYCERIDEMIA: Primary | ICD-10-CM

## 2020-08-15 LAB — SARS-COV-2 RNA RESP QL NAA+PROBE: NOT DETECTED

## 2020-09-11 NOTE — TELEPHONE ENCOUNTER
Please call pt:  Her COVID 19 test was negative. Please have her schedule an appointment with me in the office regarding her concerns. Spoke with patient gave results patient verbalized understanding

## 2020-09-23 ENCOUNTER — PATIENT MESSAGE (OUTPATIENT)
Dept: FAMILY MEDICINE | Facility: CLINIC | Age: 55
End: 2020-09-23

## 2020-09-23 DIAGNOSIS — T78.40XA ALLERGIC STATE, INITIAL ENCOUNTER: ICD-10-CM

## 2020-09-24 ENCOUNTER — PATIENT MESSAGE (OUTPATIENT)
Dept: FAMILY MEDICINE | Facility: CLINIC | Age: 55
End: 2020-09-24

## 2020-09-24 ENCOUNTER — TELEPHONE (OUTPATIENT)
Dept: FAMILY MEDICINE | Facility: CLINIC | Age: 55
End: 2020-09-24

## 2020-09-24 RX ORDER — MONTELUKAST SODIUM 10 MG/1
10 TABLET ORAL NIGHTLY
Qty: 90 TABLET | Refills: 3 | Status: SHIPPED | OUTPATIENT
Start: 2020-09-24 | End: 2020-10-24

## 2020-09-24 NOTE — TELEPHONE ENCOUNTER
----- Message from Ely Guerrero sent at 9/24/2020 12:10 PM CDT -----  Contact: [facility]  [facility] pt is needing a refill on her Montelukast 10mg..  [id number] - [place], [place] - [address]  [address] [address]  Phone: [phone] Fax: [phone]

## 2020-09-30 ENCOUNTER — OFFICE VISIT (OUTPATIENT)
Dept: PSYCHIATRY | Facility: CLINIC | Age: 55
End: 2020-09-30
Payer: COMMERCIAL

## 2020-09-30 DIAGNOSIS — F39 UNSPECIFIED MOOD (AFFECTIVE) DISORDER: ICD-10-CM

## 2020-09-30 DIAGNOSIS — F41.1 GAD (GENERALIZED ANXIETY DISORDER): Primary | ICD-10-CM

## 2020-09-30 PROCEDURE — 90833 PSYTX W PT W E/M 30 MIN: CPT | Mod: 95,,, | Performed by: NURSE PRACTITIONER

## 2020-09-30 PROCEDURE — 99214 PR OFFICE/OUTPT VISIT, EST, LEVL IV, 30-39 MIN: ICD-10-PCS | Mod: 95,,, | Performed by: NURSE PRACTITIONER

## 2020-09-30 PROCEDURE — 99214 OFFICE O/P EST MOD 30 MIN: CPT | Mod: 95,,, | Performed by: NURSE PRACTITIONER

## 2020-09-30 PROCEDURE — 90833 PR PSYCHOTHERAPY W/PATIENT W/E&M, 30 MIN (ADD ON): ICD-10-PCS | Mod: 95,,, | Performed by: NURSE PRACTITIONER

## 2020-09-30 RX ORDER — SERTRALINE HYDROCHLORIDE 100 MG/1
150 TABLET, FILM COATED ORAL DAILY
Qty: 45 TABLET | Refills: 2 | Status: SHIPPED | OUTPATIENT
Start: 2020-09-30 | End: 2021-01-19 | Stop reason: SDUPTHER

## 2020-09-30 NOTE — PROGRESS NOTES
"Outpatient Psychiatry Follow-Up Visit    Clinical Status of Patient: Outpatient (Virtual)  09/30/2020     The patient location is: [address] [place] [id number]  [phone] (H)  The patient location [place] is:  [place]  The patient phone number is:[phone] (R)  Visit type: Virtual visit with synchronous audio and video  Each patient to whom he or she provides medical services by telemedicine is:  (1) informed of the relationship between the physician and patient and the respective role of any other health care provider with respect to management of the patient; and (2) notified that he or she may decline to receive medical services by telemedicine and may withdraw from such care at any time.       Chief Complaint: Pt is a 53 year old female  who presents today for a follow-up. Met with patient.       Interval History and Content of Current Session:  Interim Events/Subjective Report/Content of Current Session:  follow up appointment.    Pt is a 55 yo female  with past psychiatric hx of XIANG, mood disorder NOS who presents for follow up treatment. She is currently taking Zoloft 100 mg QD (increase from 75mg qd in 1/20), Klonopin 0.5 mg QD PRN (uses daily), Buspirone 10mg BID, Vistaril 25 QHS PRN, Trazodone 50mg QHS (started during her visit 1/20). Meds tolerated well and provide good relief of symptoms overall. Since her last session, pt notes "I still have an edge to be sometimes. I can still get irritable at times and feel a little anxious." Notes generalized, ruminative worrying on a daily basis. Feels restless and tense. However does deny any worsening of depression. Pt expresses her desire to increase Zoloft but taper off Buspar at this time.     Past Psychiatric hx: Pt is a  53 year old female who established care with me 05/19. Referral from Dr. Guo who has been managing her psychiatric medications. She came to me taking Zoloft 50 mg QD, Buspirone 10mg BID, Xanax 0.25 mg QD PRN, Vistaril 25 mg " "QHS for sleep. During her initial eval, pet met criteria for XIANG and I increased her Zoloft to 75 mg QD. No other med changes were made. Since increasing Zoloft, pt reports an overall decrease in anxiety levels. She reports less generalized and ruminative worry. She states "anxiety attacks have really decreased." She recently went on a trip where she almost missed her flight, and handled this surprisingly well. Klonopin worked well initially, but she feels that this has lost effect with time.  Pt speaks to having achieved moderate results from these meds so far. Reports first being seen by psych in 2001 following a divorce, in which she was prescribed Paxil with good results.     Hx:  "I feel like my anxiety has been getting worse. I feel like I'm going to jump through my skin. I have a very high stress job that requires me to meet deadlines and perform, and the anxiety has been getting in the way of that"     Pt speaks to a long standing history of anxiety and some depression. She states "there probably have been some events that have made things worse." Pt reports recently ending a long-term relationship, while almost simultaneously discovering her father had two strokes. Also, during this time, her son came out as lovell. She has accepted this, although initially was a major stressor her. Notes periods of apathy, lack of motivation, low mood. Energy and concentration also dec'd during these times.     Pt's anxiety has started interfering with her daily functioning and ability to perform at work. She describes herself as a "wrorrier at times". She frequently feels scatter-brained and forgetful. She endorses generalized and ruminative worry about "wanting to run away." She struggles with feelings of restlessness "on edge, tense". She has noticed increased muscle tension in her neck and often clinches her jaw. Reports some fatigue, "I struggle to make it through the day sometimes". Endorses "anxiety attacks" due to a " buildup of situational stress, but denies panic attacks.      Past Medical hx:   Past Medical History:   Diagnosis Date    Anemia     patient states has resilved    Hypertension     PONV (postoperative nausea and vomiting)     Thyroid disorder         Interim hx:  Medication changes last visit: increase zoloft to 100mg qd, start trazodone 50mg qhs  Anxiety: inc'd  Depression: denies     Denies suicidal/homicidal ideations.  Denies hopelessness/worthlessness.    Denies auditory/visual hallucinations      Tobacco: never   Alcohol: 3-5 glasses wine weekly, social   Drug use: denies  Caffeine: 2 soft drinks daily  Review of Systems   · PSYCHIATRIC: Pertinent items are noted in the narrative.        CONSTITUTIONAL: weight stable        M/S: no pain today         ENT: no allergies noted today        ABD: no n/v/d     Past Medical, Family and Social History: The patient's past medical, family and social history have been reviewed and updated as appropriate within the electronic medical record. See encounter notes.     Medication: Zoloft 75 mg QD, Buspirone 10mg BID, Klonopin 0.5 mg QD PRN, Vistaril 25 mg QHS for sleep     Compliance: yes      Side effects: tolerates     Risk Parameters:  Patient reports no suicidal ideation  Patient reports no homicidal ideation  Patient reports no self-injurious behavior  Patient reports no violent behavior     Exam (detailed: at least 9 elements; comprehensive: all 15 elements)   Constitutional  Vitals:  Most recent vital signs, dated less than 90 days prior to this appointment, were reviewed.   General:  unremarkable, age appropriate, casual attire, good eye contact, good rapport       Musculoskeletal  Muscle Strength/Tone:  no flaccidity, no tremor    Gait & Station:  normal      Psychiatric                       Speech:  normal tone, normal rate, rhythm, and volume   Mood & Affect:   Euthymic, congruent, appropriate         Thought Process:   Goal directed; Linear    Associations:  "  intact   Thought Content:   No SI/HI, delusions, or paranoia, no AV/VH   Insight & Judgement:   Good, adequate to circumstances   Orientation:   grossly intact; alert and oriented x 4    Memory:  intact for content of interview    Language:  grossly intact, can repeat    Attention Span  : Grossly intact for content of interview   Fund of Knowledge:   intact and appropriate to age and level of education        Assessment and Diagnosis   Status/Progress: Based on the examination today, the patient's problem(s) is/are under fair control.  New problems have not been presented today. Comorbidities are not currently complicating management of the primary condition.      Impression:    Pt is a 55 yo female  with past psychiatric hx of XIANG, mood disorder NOS who presents for follow up treatment. She is currently taking Zoloft 100 mg QD (increase from 75mg qd in 1/20), Klonopin 0.5 mg QD PRN (uses daily), Buspirone 10mg BID, Vistaril 25 QHS PRN, Trazodone 50mg QHS (started during her visit 1/20). Meds tolerated well and provide good relief of symptoms overall. Since her last session, pt notes "I still have an edge to be sometimes. I can still get irritable at times and feel a little anxious." Notes generalized, ruminative worrying on a daily basis. Feels restless and tense. However does deny any worsening of depression. Pt expresses her desire to increase Zoloft but taper off Buspar at this time.   Diagnosis: XIANG, mood disorder unspecified    Intervention/Counseling/Treatment Plan   · Medication Management:      1) Inc Zoloft to 150mg QD for anxiety. Discussed potential for GI side effects, sexual dysfunction, mood destabilization, headaches  2) ContTrazodone 50mg QHS for sleep.  3) Cont Klonopin to 0.5 mg QD PRN. Discussed risk of decreased RT, sedation, addictive potential, and not to mix with alcohol.  4)  Decrease Buspar to 10mg QD x 7 days, then stop  5) Cont Vistaril 25 mg QHS for sleep      6. Call to report any " worsening of symptoms or problems with the medication. Pt instructed to go to ER with thoughts of harming self, others     7. Patient given contact # for psychotherapists at [facility] and also instructed she may check with insurance for list of providers.      8. Labs: no new orders    Psychotherapy:   · Target symptoms: anxiety, depression  · Why chosen therapy is appropriate versus another modality: relevant to diagnosis, patient responds to this modality  · Outcome monitoring methods: self-report, observation, feedback from family   · Therapeutic intervention type: supportive psychotherapy  · Topics discussed/themes: building skills sets for symptom management, symptom recognition, nutrition, exercise  · The patient's response to the intervention is accepting. The patient's progress toward treatment goals is positive progress.  · Duration of intervention: 20 minutes     Return to clinic: 1 mo - self scjhedule    -Spent 30min face to face with the pt; >50% time spent in counseling   -Supportive therapy and psychoeducation provided  -R/B/SE's of medications discussed with the pt who expresses understanding and chooses to take medications as prescribed.   -Pt instructed to call clinic, 911 or go to nearest emergency room if sxs worsen or pt is in   crisis. The pt expresses understanding.    Baljit Rubin, NP

## 2020-10-30 ENCOUNTER — PATIENT MESSAGE (OUTPATIENT)
Dept: ADMINISTRATIVE | Facility: HOSPITAL | Age: 55
End: 2020-10-30

## 2021-01-04 ENCOUNTER — PATIENT MESSAGE (OUTPATIENT)
Dept: ADMINISTRATIVE | Facility: HOSPITAL | Age: 56
End: 2021-01-04

## 2021-01-26 NOTE — PROGRESS NOTES
"Outpatient Psychiatry Follow-Up Visit    Clinical Status of Patient: Outpatient (Ambulatory)  07/22/2019     Chief Complaint: Pt is a 53 year old female  who presents today for a follow-up. Met with patient.       Interval History and Content of Current Session:  Interim Events/Subjective Report/Content of Current Session:  follow up appointment.    Pt is a  with past psychiatric hx of XIANG who presents for follow up treatment. She came to me taking Zoloft 50 mg QD, Buspirone 10mg BID, Xanax 0.25 mg QD PRN, Vistaril 25 mg QHS for sleep. During her initial eval, pet met criteria for XIANG and I increased her Zoloft to 75 mg QD. No other med changes were made. Since increasing Zoloft, pt reports an overall decrease in anxiety levels. She reports less generalized and ruminative worry. She states "anxiety attacks have really decreased." She recently went on a trip where she almost missed her flight, and handled this surprisingly well. Klonopin worked well initially, but she feels that this has lost effect with time.     Since last visit, pt states "I feel pretty good. I'm not like happy rah-rah, but I definitely not as wound up anymore. Not as anxious." Denies any panic attacks since last visit. "My mental health has been really good, definitely improving.    Pt has received vertical sleep gastrectomy since last visit.     Sleep is good, appetite is good. Notes improved energy and concentration.       Past Psychiatric hx: . is a  53 year old female who is presenting to the clinic for an initial evaluation and treatment. Referral from Dr. Guo who has been managing her psychiatric medications, currently taking Zoloft 50 mg QD, Buspirone 10mg BID, Xanax 0.25 mg QD PRN, Vistaril 25 mg QHS for sleep. Pt was started on these meds about two months. Pt speaks to having achieved moderate results from these meds so far. Reports first being seen by psych in 2001 following a divorce, in which she was prescribed Paxil with good " "results.     "I feel like my anxiety has been getting worse. I feel like I'm going to jump through my skin. I have a very high stress job that requires me to meet deadlines and perform, and the anxiety has been getting in the way of that"     Pt speaks to a long standing history of anxiety and some depression. She states "there probably have been some events that have made things worse." Pt reports recently ending a long-term relationship, while almost simultaneously discovering her father had two strokes. Also, during this time, her son came out as lovell. She has accepted this, although initially was a major stressor her.     Pt's anxiety has started interfering with her daily functioning and ability to perform at work. She describes herself as a "wrorrier at times". She frequently feels scatter-brained and forgetful. She endorses generalized and ruminative worry about "wanting to run away." She struggles with feelings of restlessness "on edge, tense". She has noticed increased muscle tension in her neck and often clinches her jaw. Reports some fatigue, "I struggle to make it through the day sometimes". Endorses "anxiety attacks" due to a buildup of situational stress, but denies panic attacks.      Past Medical hx:   Past Medical History:   Diagnosis Date    Anemia     patient states has resilved    Hypertension     PONV (postoperative nausea and vomiting)     Thyroid disorder         Interim hx:  Medication changes last visit: none  Anxiety: 4/10  Depression: 3/10      Denies suicidal/homicidal ideations.  Denies hopelessness/worthlessness.    Denies auditory/visual hallucinations      Tobacco: never   Alcohol: 3-5 glasses wine weekly, social   Drug use: denies  Caffeine: 2 soft drinks daily  Review of Systems   · PSYCHIATRIC: Pertinent items are noted in the narrative.        CONSTITUTIONAL: weight stable        M/S: no pain today         ENT: no allergies noted today        ABD: no n/v/d     Past Medical, " "Family and Social History: The patient's past medical, family and social history have been reviewed and updated as appropriate within the electronic medical record. See encounter notes.     Medication: Zoloft 75 mg QD, Buspirone 10mg BID, Klonopin 0.5 mg QD PRN, Vistaril 25 mg QHS for sleep     Compliance: yes      Side effects: tolerates     Risk Parameters:  Patient reports no suicidal ideation  Patient reports no homicidal ideation  Patient reports no self-injurious behavior  Patient reports no violent behavior     Exam (detailed: at least 9 elements; comprehensive: all 15 elements)   Constitutional  Vitals:  Most recent vital signs, dated less than 90 days prior to this appointment, were reviewed. /83 (BP Location: Right arm, Patient Position: Sitting)   Pulse 76   Resp 16   Ht 5' 7" (1.702 m)   Wt 95.5 kg (210 lb 8.6 oz)   LMP 08/09/2015   BMI 32.98 kg/m²      General:  unremarkable, age appropriate, casual attire, good eye contact, good rapport       Musculoskeletal  Muscle Strength/Tone:  no flaccidity, no tremor    Gait & Station:  normal      Psychiatric                       Speech:  normal tone, normal rate, rhythm, and volume   Mood & Affect:   Euthymic, congruent, appropriate         Thought Process:   Goal directed; Linear    Associations:   intact   Thought Content:   No SI/HI, delusions, or paranoia, no AV/VH   Insight & Judgement:   Good, adequate to circumstances   Orientation:   grossly intact; alert and oriented x 4    Memory:  intact for content of interview    Language:  grossly intact, can repeat    Attention Span  : Grossly intact for content of interview   Fund of Knowledge:   intact and appropriate to age and level of education        Assessment and Diagnosis   Status/Progress: Based on the examination today, the patient's problem(s) is/are under fair control.  New problems have not been presented today. Comorbidities are not currently complicating management of the primary " "condition.      Impression:  Pt is a  with past psychiatric hx of XIANG who presents for follow up treatment. She came to me taking Zoloft 50 mg QD, Buspirone 10mg BID, Xanax 0.25 mg QD PRN, Vistaril 25 mg QHS for sleep. During her initial eval, pet met criteria for XIANG and I increased her Zoloft to 75 mg QD. No other med changes were made. Since increasing Zoloft, pt reports an overall decrease in anxiety levels. She reports less generalized and ruminative worry. She states "anxiety attacks have really decreased." She recently went on a trip where she almost missed her flight, and handled this surprisingly well. Klonopin worked well initially, but she feels that this has lost effect with time.     Since last visit, pt states "I feel pretty good. I'm not like happy rah-rah, but I definitely not as wound up anymore. Not as anxious." Denies any panic attacks since last visit. "My mental health has been really good, definitely improving.    Pt has received vertical sleep gastrectomy since last visit.     Sleep is good, appetite is good. Notes improved energy and concentration.       Diagnosis: XIANG    Intervention/Counseling/Treatment Plan   · Medication Management:      1) Cont Zoloft 75 mg QD for anxiety. Discussed potential for GI side effects, sexual dysfunction, mood destabilization, headaches  2) Cont Klonopin to 0.5 mg QD PRN. Discussed risk of decreased RT, sedation, addictive potential, and not to mix with alcohol.  3)  Cont Buspirone 10 mg BID  4) Cont Vistaril 25 mg QHS for sleep      4. Call to report any worsening of symptoms or problems with the medication. Pt instructed to go to ER with thoughts of harming self, others     5. Patient given contact # for psychotherapists at [facility] and also instructed she may check with insurance for list of providers.      6. Labs: no new orders    Psychotherapy:   · Target symptoms: anxiety  · Why chosen therapy is appropriate versus another modality: relevant to " diagnosis, patient responds to this modality  · Outcome monitoring methods: self-report, observation, feedback from family   · Therapeutic intervention type: supportive psychotherapy  · Topics discussed/themes: building skills sets for symptom management, symptom recognition, nutrition, exercise  · The patient's response to the intervention is accepting. The patient's progress toward treatment goals is positive progress.  · Duration of intervention: 20 minutes     Return to clinic: 3 mo    -Spent 30min face to face with the pt; >50% time spent in counseling   -Supportive therapy and psychoeducation provided  -R/B/SE's of medications discussed with the pt who expresses understanding and chooses to take medications as prescribed.   -Pt instructed to call clinic, 911 or go to nearest emergency room if sxs worsen or pt is in   crisis. The pt expresses understanding.    Baljit Rubin, NP     Consent (Near Eyelid Margin)/Introductory Paragraph: The rationale for Mohs was explained to the patient and consent was obtained. The risks, benefits and alternatives to therapy were discussed in detail. Specifically, the risks of ectropion or eyelid deformity, infection, scarring, bleeding, prolonged wound healing, incomplete removal, allergy to anesthesia, nerve injury and recurrence were addressed. Prior to the procedure, the treatment site was clearly identified and confirmed by the patient. All components of Universal Protocol/PAUSE Rule completed.

## 2021-04-06 ENCOUNTER — PATIENT MESSAGE (OUTPATIENT)
Dept: ADMINISTRATIVE | Facility: HOSPITAL | Age: 56
End: 2021-04-06

## 2021-04-23 ENCOUNTER — OFFICE VISIT (OUTPATIENT)
Dept: PSYCHIATRY | Facility: CLINIC | Age: 56
End: 2021-04-23
Payer: COMMERCIAL

## 2021-04-23 DIAGNOSIS — F41.1 GAD (GENERALIZED ANXIETY DISORDER): Primary | ICD-10-CM

## 2021-04-23 DIAGNOSIS — F39 UNSPECIFIED MOOD (AFFECTIVE) DISORDER: ICD-10-CM

## 2021-04-23 PROCEDURE — 99214 PR OFFICE/OUTPT VISIT, EST, LEVL IV, 30-39 MIN: ICD-10-PCS | Mod: 95,,, | Performed by: NURSE PRACTITIONER

## 2021-04-23 PROCEDURE — 99214 OFFICE O/P EST MOD 30 MIN: CPT | Mod: 95,,, | Performed by: NURSE PRACTITIONER

## 2021-04-23 PROCEDURE — 90833 PR PSYCHOTHERAPY W/PATIENT W/E&M, 30 MIN (ADD ON): ICD-10-PCS | Mod: 95,,, | Performed by: NURSE PRACTITIONER

## 2021-04-23 PROCEDURE — 90833 PSYTX W PT W E/M 30 MIN: CPT | Mod: 95,,, | Performed by: NURSE PRACTITIONER

## 2021-04-23 RX ORDER — SERTRALINE HYDROCHLORIDE 100 MG/1
150 TABLET, FILM COATED ORAL DAILY
Qty: 45 TABLET | Refills: 2 | Status: SHIPPED | OUTPATIENT
Start: 2021-04-23 | End: 2021-07-30 | Stop reason: SDUPTHER

## 2021-04-23 RX ORDER — TRAZODONE HYDROCHLORIDE 50 MG/1
50 TABLET ORAL NIGHTLY
Qty: 30 TABLET | Refills: 1 | Status: SHIPPED | OUTPATIENT
Start: 2021-04-23 | End: 2021-07-30 | Stop reason: SDUPTHER

## 2021-04-23 RX ORDER — HYDROXYZINE HYDROCHLORIDE 25 MG/1
25 TABLET, FILM COATED ORAL DAILY
Qty: 30 TABLET | Refills: 2 | Status: SHIPPED | OUTPATIENT
Start: 2021-04-23 | End: 2023-07-24

## 2021-07-07 ENCOUNTER — PATIENT MESSAGE (OUTPATIENT)
Dept: ADMINISTRATIVE | Facility: HOSPITAL | Age: 56
End: 2021-07-07

## 2021-07-30 ENCOUNTER — OFFICE VISIT (OUTPATIENT)
Dept: PSYCHIATRY | Facility: CLINIC | Age: 56
End: 2021-07-30
Payer: COMMERCIAL

## 2021-07-30 DIAGNOSIS — F39 UNSPECIFIED MOOD (AFFECTIVE) DISORDER: Primary | ICD-10-CM

## 2021-07-30 DIAGNOSIS — F41.1 GAD (GENERALIZED ANXIETY DISORDER): ICD-10-CM

## 2021-07-30 PROCEDURE — 99214 PR OFFICE/OUTPT VISIT, EST, LEVL IV, 30-39 MIN: ICD-10-PCS | Mod: 95,,, | Performed by: NURSE PRACTITIONER

## 2021-07-30 PROCEDURE — 90833 PR PSYCHOTHERAPY W/PATIENT W/E&M, 30 MIN (ADD ON): ICD-10-PCS | Mod: 95,,, | Performed by: NURSE PRACTITIONER

## 2021-07-30 PROCEDURE — 99214 OFFICE O/P EST MOD 30 MIN: CPT | Mod: 95,,, | Performed by: NURSE PRACTITIONER

## 2021-07-30 PROCEDURE — 90833 PSYTX W PT W E/M 30 MIN: CPT | Mod: 95,,, | Performed by: NURSE PRACTITIONER

## 2021-08-21 DIAGNOSIS — Z12.11 COLON CANCER SCREENING: ICD-10-CM

## 2021-11-11 DIAGNOSIS — Z12.31 OTHER SCREENING MAMMOGRAM: ICD-10-CM

## 2021-12-19 ENCOUNTER — IMMUNIZATION (OUTPATIENT)
Dept: PRIMARY CARE CLINIC | Facility: CLINIC | Age: 56
End: 2021-12-19

## 2021-12-19 DIAGNOSIS — Z23 NEED FOR VACCINATION: Primary | ICD-10-CM

## 2021-12-19 PROCEDURE — 0064A COVID-19, MRNA, LNP-S, PF, 100 MCG/0.25 ML DOSE VACCINE (MODERNA BOOSTER): CPT | Mod: CV19,PBBFAC | Performed by: INTERNAL MEDICINE

## 2022-02-10 ENCOUNTER — PATIENT MESSAGE (OUTPATIENT)
Dept: PSYCHIATRY | Facility: CLINIC | Age: 57
End: 2022-02-10
Payer: COMMERCIAL

## 2022-02-10 RX ORDER — TRAZODONE HYDROCHLORIDE 50 MG/1
50 TABLET ORAL NIGHTLY
Qty: 30 TABLET | Refills: 0 | Status: SHIPPED | OUTPATIENT
Start: 2022-02-10 | End: 2022-02-10 | Stop reason: SDUPTHER

## 2022-02-10 RX ORDER — SERTRALINE HYDROCHLORIDE 100 MG/1
150 TABLET, FILM COATED ORAL DAILY
Qty: 45 TABLET | Refills: 0 | Status: SHIPPED | OUTPATIENT
Start: 2022-02-10 | End: 2022-03-07

## 2022-02-10 RX ORDER — SERTRALINE HYDROCHLORIDE 100 MG/1
150 TABLET, FILM COATED ORAL DAILY
Qty: 45 TABLET | Refills: 0 | Status: SHIPPED | OUTPATIENT
Start: 2022-02-10 | End: 2022-02-10 | Stop reason: SDUPTHER

## 2022-02-10 RX ORDER — TRAZODONE HYDROCHLORIDE 50 MG/1
50 TABLET ORAL NIGHTLY
Qty: 30 TABLET | Refills: 0 | Status: SHIPPED | OUTPATIENT
Start: 2022-02-10 | End: 2022-03-07

## 2022-02-10 NOTE — TELEPHONE ENCOUNTER
Requesting refills for Zoloft and Trazadone.    Last O/V: 7/30/21  Last refill: 1/4/22  Next O/V: not scheduled (recommended 3 month follow up)

## 2022-02-11 ENCOUNTER — TELEPHONE (OUTPATIENT)
Dept: PSYCHIATRY | Facility: CLINIC | Age: 57
End: 2022-02-11
Payer: COMMERCIAL

## 2022-02-21 ENCOUNTER — PATIENT MESSAGE (OUTPATIENT)
Dept: PSYCHIATRY | Facility: CLINIC | Age: 57
End: 2022-02-21
Payer: COMMERCIAL

## 2022-05-31 ENCOUNTER — PATIENT MESSAGE (OUTPATIENT)
Dept: ADMINISTRATIVE | Facility: HOSPITAL | Age: 57
End: 2022-05-31
Payer: COMMERCIAL

## 2022-08-09 ENCOUNTER — PATIENT MESSAGE (OUTPATIENT)
Dept: PSYCHIATRY | Facility: CLINIC | Age: 57
End: 2022-08-09
Payer: COMMERCIAL

## 2022-10-07 ENCOUNTER — OFFICE VISIT (OUTPATIENT)
Dept: URGENT CARE | Facility: CLINIC | Age: 57
End: 2022-10-07
Payer: COMMERCIAL

## 2022-10-07 VITALS
RESPIRATION RATE: 18 BRPM | BODY MASS INDEX: 31.39 KG/M2 | HEART RATE: 84 BPM | WEIGHT: 200 LBS | OXYGEN SATURATION: 97 % | HEIGHT: 67 IN | SYSTOLIC BLOOD PRESSURE: 160 MMHG | TEMPERATURE: 98 F | DIASTOLIC BLOOD PRESSURE: 82 MMHG

## 2022-10-07 DIAGNOSIS — T81.31XA POSTOPERATIVE DEHISCENCE OF SKIN WOUND, INITIAL ENCOUNTER: Primary | ICD-10-CM

## 2022-10-07 PROCEDURE — 4010F PR ACE/ARB THEARPY RXD/TAKEN: ICD-10-PCS | Mod: CPTII,S$GLB,, | Performed by: NURSE PRACTITIONER

## 2022-10-07 PROCEDURE — 3008F BODY MASS INDEX DOCD: CPT | Mod: CPTII,S$GLB,, | Performed by: NURSE PRACTITIONER

## 2022-10-07 PROCEDURE — 3077F SYST BP >= 140 MM HG: CPT | Mod: CPTII,S$GLB,, | Performed by: NURSE PRACTITIONER

## 2022-10-07 PROCEDURE — 3008F PR BODY MASS INDEX (BMI) DOCUMENTED: ICD-10-PCS | Mod: CPTII,S$GLB,, | Performed by: NURSE PRACTITIONER

## 2022-10-07 PROCEDURE — 99213 OFFICE O/P EST LOW 20 MIN: CPT | Mod: S$GLB,,, | Performed by: NURSE PRACTITIONER

## 2022-10-07 PROCEDURE — 3079F PR MOST RECENT DIASTOLIC BLOOD PRESSURE 80-89 MM HG: ICD-10-PCS | Mod: CPTII,S$GLB,, | Performed by: NURSE PRACTITIONER

## 2022-10-07 PROCEDURE — 4010F ACE/ARB THERAPY RXD/TAKEN: CPT | Mod: CPTII,S$GLB,, | Performed by: NURSE PRACTITIONER

## 2022-10-07 PROCEDURE — 3079F DIAST BP 80-89 MM HG: CPT | Mod: CPTII,S$GLB,, | Performed by: NURSE PRACTITIONER

## 2022-10-07 PROCEDURE — 1160F PR REVIEW ALL MEDS BY PRESCRIBER/CLIN PHARMACIST DOCUMENTED: ICD-10-PCS | Mod: CPTII,S$GLB,, | Performed by: NURSE PRACTITIONER

## 2022-10-07 PROCEDURE — 1160F RVW MEDS BY RX/DR IN RCRD: CPT | Mod: CPTII,S$GLB,, | Performed by: NURSE PRACTITIONER

## 2022-10-07 PROCEDURE — 3077F PR MOST RECENT SYSTOLIC BLOOD PRESSURE >= 140 MM HG: ICD-10-PCS | Mod: CPTII,S$GLB,, | Performed by: NURSE PRACTITIONER

## 2022-10-07 PROCEDURE — 1159F PR MEDICATION LIST DOCUMENTED IN MEDICAL RECORD: ICD-10-PCS | Mod: CPTII,S$GLB,, | Performed by: NURSE PRACTITIONER

## 2022-10-07 PROCEDURE — 1159F MED LIST DOCD IN RCRD: CPT | Mod: CPTII,S$GLB,, | Performed by: NURSE PRACTITIONER

## 2022-10-07 PROCEDURE — 99213 PR OFFICE/OUTPT VISIT, EST, LEVL III, 20-29 MIN: ICD-10-PCS | Mod: S$GLB,,, | Performed by: NURSE PRACTITIONER

## 2022-10-07 RX ORDER — ESTRADIOL 0.05 MG/D
0.05 PATCH TRANSDERMAL
COMMUNITY
Start: 2021-10-31

## 2022-10-07 RX ORDER — MUPIROCIN 20 MG/G
OINTMENT TOPICAL
Qty: 22 G | Refills: 1 | Status: SHIPPED | OUTPATIENT
Start: 2022-10-07 | End: 2024-02-15

## 2022-10-07 RX ORDER — DOXYCYCLINE 100 MG/1
100 CAPSULE ORAL 2 TIMES DAILY
Qty: 14 CAPSULE | Refills: 0 | Status: SHIPPED | OUTPATIENT
Start: 2022-10-07 | End: 2022-10-14

## 2022-10-07 RX ORDER — LISINOPRIL 20 MG/1
20 TABLET ORAL DAILY
COMMUNITY
Start: 2022-09-07 | End: 2024-02-08 | Stop reason: SDUPTHER

## 2022-10-07 NOTE — PROGRESS NOTES
"Subjective:       Patient ID: Eugenie Najera is a 56 y.o. female.    Vitals:  height is 5' 7" (1.702 m) and weight is 90.7 kg (200 lb). Her oral temperature is 97.5 °F (36.4 °C). Her blood pressure is 160/82 (abnormal) and her pulse is 84. Her respiration is 18 and oxygen saturation is 97%.     Chief Complaint: Suture / Staple Removal    Patient is here to get a suture removed.Patient suture was placed 2 weeks ago.Patient stated that she worried they are infected.  Provider note begins below    Pt reports using a chin strap.  Thinks this may have interfered with healing.  Initial procedure was completed in [facility].    Suture / Staple Removal  The sutures were placed 11 to 14 days ago. She tried oral antibiotics since the wound repair. The treatment provided moderate relief. Her temperature was unmeasured prior to arrival. There has been bloody and clear discharge from the wound. There is new redness present. There is new swelling present.   ROS    Objective:      Physical Exam   Constitutional: She is oriented to person, place, and time.   HENT:   Head: Normocephalic and atraumatic.   Cardiovascular: Normal rate.   Pulmonary/Chest: Effort normal. No respiratory distress.   Abdominal: Normal appearance.   Neurological: She is alert and oriented to person, place, and time.   Skin: Skin is warm and dry. Lacerations - head:  R ear and L ear       Psychiatric: Her behavior is normal. Mood normal.             Assessment:       1. Postoperative dehiscence of skin wound, initial encounter          Plan:       Wound cleaned and running sutures x 3 each side removed.  Poor wound closure to left side of face.  No eschar noted.  Debridement done.    Referral to plastics  Spoke with clinic         Postoperative dehiscence of skin wound, initial encounter  -     Ambulatory referral/consult to Plastic Surgery                   "

## 2022-10-21 ENCOUNTER — TELEPHONE (OUTPATIENT)
Dept: PLASTIC SURGERY | Facility: CLINIC | Age: 57
End: 2022-10-21
Payer: COMMERCIAL

## 2022-10-26 ENCOUNTER — PATIENT MESSAGE (OUTPATIENT)
Dept: PSYCHIATRY | Facility: CLINIC | Age: 57
End: 2022-10-26
Payer: COMMERCIAL

## 2022-10-27 ENCOUNTER — OFFICE VISIT (OUTPATIENT)
Dept: GASTROENTEROLOGY | Facility: CLINIC | Age: 57
End: 2022-10-27
Payer: COMMERCIAL

## 2022-10-27 VITALS
BODY MASS INDEX: 34.91 KG/M2 | WEIGHT: 222.44 LBS | HEIGHT: 67 IN | SYSTOLIC BLOOD PRESSURE: 149 MMHG | HEART RATE: 80 BPM | DIASTOLIC BLOOD PRESSURE: 90 MMHG

## 2022-10-27 DIAGNOSIS — Z12.11 COLON CANCER SCREENING: Primary | ICD-10-CM

## 2022-10-27 PROCEDURE — 1159F MED LIST DOCD IN RCRD: CPT | Mod: CPTII,S$GLB,, | Performed by: INTERNAL MEDICINE

## 2022-10-27 PROCEDURE — 3080F DIAST BP >= 90 MM HG: CPT | Mod: CPTII,S$GLB,, | Performed by: INTERNAL MEDICINE

## 2022-10-27 PROCEDURE — 3077F SYST BP >= 140 MM HG: CPT | Mod: CPTII,S$GLB,, | Performed by: INTERNAL MEDICINE

## 2022-10-27 PROCEDURE — 99204 PR OFFICE/OUTPT VISIT, NEW, LEVL IV, 45-59 MIN: ICD-10-PCS | Mod: S$GLB,,, | Performed by: INTERNAL MEDICINE

## 2022-10-27 PROCEDURE — 1159F PR MEDICATION LIST DOCUMENTED IN MEDICAL RECORD: ICD-10-PCS | Mod: CPTII,S$GLB,, | Performed by: INTERNAL MEDICINE

## 2022-10-27 PROCEDURE — 4010F PR ACE/ARB THEARPY RXD/TAKEN: ICD-10-PCS | Mod: CPTII,S$GLB,, | Performed by: INTERNAL MEDICINE

## 2022-10-27 PROCEDURE — 99204 OFFICE O/P NEW MOD 45 MIN: CPT | Mod: S$GLB,,, | Performed by: INTERNAL MEDICINE

## 2022-10-27 PROCEDURE — 99999 PR PBB SHADOW E&M-EST. PATIENT-LVL III: CPT | Mod: PBBFAC,,, | Performed by: INTERNAL MEDICINE

## 2022-10-27 PROCEDURE — 4010F ACE/ARB THERAPY RXD/TAKEN: CPT | Mod: CPTII,S$GLB,, | Performed by: INTERNAL MEDICINE

## 2022-10-27 PROCEDURE — 3080F PR MOST RECENT DIASTOLIC BLOOD PRESSURE >= 90 MM HG: ICD-10-PCS | Mod: CPTII,S$GLB,, | Performed by: INTERNAL MEDICINE

## 2022-10-27 PROCEDURE — 99999 PR PBB SHADOW E&M-EST. PATIENT-LVL III: ICD-10-PCS | Mod: PBBFAC,,, | Performed by: INTERNAL MEDICINE

## 2022-10-27 PROCEDURE — 3077F PR MOST RECENT SYSTOLIC BLOOD PRESSURE >= 140 MM HG: ICD-10-PCS | Mod: CPTII,S$GLB,, | Performed by: INTERNAL MEDICINE

## 2022-10-27 NOTE — PROGRESS NOTES
"[facility] Consultation     Reason for Consult:  The encounter diagnosis was Colon cancer screening.    PCP:   Westley Guo   No address on file    Referring MD:  Aaareferral Self  No address on file    HPI:  This is a 56 y.o. female with anxiety, depression, HTN, liposuction (2022) who presents to GI clinic.  She has a history of lap band converted to sleeve gastrectomy.     She says she developed a dry cough with associated "tickle" and occasional post-tussive emesis for the past year. These symptoms are intermittent, longest episode lasted 2 months. Currently, happens daily but not constant.   She is currently on nexium for the past 2 years ; takes it as needed (3 x per week). She says the nexium does not help the cough.   She thought the cough may be allergy related so she saw an allergist in [place] and says she is allergic to dust    Lap band was performed in [id number] in [place].   She had the band removed and the sleeve gastrectomy in 2019 in [place].  She says she lost weight initially with the lap band however did not lose weight with the gastrectomy.   She had liposuction in [place] in 2022.     Cologuard 2/2022 normal  Bowel movements are brown, solid; sometimes "pellets". Her diet consists of eggs, avocado, yogurt, pizza, bread, cheese, chicken.     EGD 2018 with Small polypoid "mass" found in the right pyriform                        sinus, not obstructing the airway.                        - Food in the mid esophagus.                        - Non-severe chronic (stasis?) esophagitis.                        - Small pseudo - hiatal hernia.                        - Extrinsic compression in the cardia, due to the                        gastric band device.                        - Gastric stenosis was found in the cardia, due to                        the gastric band device.                        - Normal stomach. Biopsied.                        - Normal examined duodenum. "                        - Esophagus / cardia dilated. Dilated, 51 Fr and 54                        Fr.   Biopsies negative for Helicobacter Pylori.    She works as a senior stock .     Denies tobacco use. Denies drug use. Drinks alcohol 3 times per week (wine or beer, sometimes 3 glasses at a time)  Has been living in [place] for 12 years but moved to [place] for 3 years during the pandemic    Denies family history of colorectal cancer.    ROS:  Constitutional: No fevers, chills, No weight loss  ENT: No allergies  CV: No chest pain  Pulm: No cough, No shortness of breath  Ophtho: No vision changes  GI: see HPI  Derm: No rash  Heme: No lymphadenopathy, No bruising  MSK: No arthritis  : No dysuria, No hematuria  Endo: No hot or cold intolerance  Neuro: No syncope, No seizure  Psych: No anxiety, No depression    Medical History:  has a past medical history of Anemia, Hypertension, PONV (postoperative nausea and vomiting), and Thyroid disorder.    Surgical History:  has a past surgical history that includes  section; lapband (~); Tubal ligation; Dilation and curettage of uterus; Esophagogastroduodenoscopy (N/A, 2018); Breast surgery (); Laryngoscopy (Right, 3/21/2019); and Augmentation of breast (Bilateral).    Family History: family history includes Cancer in her father; Diabetes in her father; Hypertension in her father; Ovarian cancer in her paternal aunt..   No contributory family history     Social History:  reports that she has never smoked. She has never used smokeless tobacco. She reports current alcohol use of about 5.0 standard drinks per week. She reports that she does not use drugs.    Review of patient's allergies indicates:   Allergen Reactions    Penicillin v potassium Swelling       Current Outpatient Rx   Medication Sig Dispense Refill    acetaminophen (TYLENOL) 500 MG tablet Take 1,000 mg by mouth daily as needed. As directed on package      estradiol 0.05 mg/24  "hr td ptwk 0.05 mg/24 hr PTWK Place 0.05 mg onto the skin.      hydroCHLOROthiazide (HYDRODIURIL) 25 MG tablet Take 1 tablet by mouth once daily 90 tablet 1    hydrOXYzine HCL (ATARAX) 25 MG tablet Take 1 tablet (25 mg total) by mouth once daily. 30 tablet 2    lisinopriL (PRINIVIL,ZESTRIL) 20 MG tablet Take 20 mg by mouth once daily.      sertraline (ZOLOFT) 100 MG tablet Take 1.5 tablets (150 mg total) by mouth once daily. 45 tablet 0    mupirocin (BACTROBAN) 2 % ointment Apply to affected area 3 times daily x 7 days (Patient not taking: Reported on 10/27/2022) 22 g 1       Objective Findings:    Vital Signs:  BP (!) 149/90   Pulse 80   Ht 5' 7" (1.702 m)   Wt 100.9 kg (222 lb 7.1 oz)   LMP 08/09/2015   BMI 34.84 kg/m²   Body mass index is 34.84 kg/m².    Physical Exam:  General Appearance: well-appearing, NAD  Head:   Normocephalic, without obvious abnormality  Eyes:    No scleral icterus, EOMI  ENT: Neck supple; moist mucus membranes  Lungs: clear to auscultation bilaterally.  Heart:  regular rate and rhythm, S1, S2 normal, no murmurs heard  Abdomen: incision sites well-healed, abdomen is somewhat firm but no rebound or guarding  Extremities: 2+ pulses, no clubbing, cyanosis or edema  Skin: No visible rash  Neurologic: no focal motor deficits      Labs:  Lab Results   Component Value Date    WBC 8.09 08/13/2020    HGB 15.7 08/13/2020    HCT 48.5 08/13/2020     08/13/2020    CHOL 249 (H) 08/13/2020    TRIG 360 (H) 08/13/2020    HDL 59 08/13/2020    ALT 12 08/13/2020    AST 21 08/13/2020     08/13/2020    K 4.0 08/13/2020     08/13/2020    CREATININE 0.8 08/13/2020    BUN 15 08/13/2020    CO2 25 08/13/2020    TSH 1.223 04/10/2019    HGBA1C 5.0 05/22/2018       IMAGING:  No recent abdominal imaging    PROCEDURES:  EGD 2018  Impression:          - Small polypoid "mass" found in the right pyriform                        sinus, not obstructing the airway.                        - Normal " cricopharyngeus.                        - Food in the mid esophagus.                        - Non-severe chronic (stasis?) esophagitis.                        - Normal upper third of esophagus and middle third                        of esophagus.                        - Z-line irregular, 35 cm from the incisors.                        Biopsied.                        - Small pseudo - hiatal hernia.                        - Extrinsic compression in the cardia, due to the                        gastric band device.                        - Gastric stenosis was found in the cardia, due to                        the gastric band device.                        - Normal stomach. Biopsied.                        - Normal examined duodenum.                        - Esophagus / cardia dilated. Dilated, 51 Fr and 54                        Fr.   1. Stomach, antrum, biopsy: Chronic gastritis. No evidence of Helicobacter species on H&E stain. 2. Esophagus at 35 cm, biopsy: Esophageal squamous mucosa and adjacent gastric type mucosa with chronic inflammation reactive changes. No evidence of intestinal metaplasia, dysplasia or malignancy is identified      Assessment/Plan:  57 yo F with history of lap band which was removed and now s/p sleeve gastrectomy, history of liposuction earlier this year who presents to GI clinic    Dry cough  Differentials include asthma, post nasal drip or GERD. We reviewed GERD lifestyle modifications in detail.  Instead of nexium prn, she will take nexium once daily 30 minutes before breakfast  Of note, POC hpylori testing 2018 was positive - but she is unsure if she took abx. Since she is on PPI currently, recommend hpylori biopsies on EGD and also evaluate for hernia, esophagitis on EGD. EGD 2018 notes polypoid mass in the right pyriform sinus - unclear of the status of this, may need ENT referral. She currently denies any dysphagia but did have dilation performed in 2018 when she still had the lap  band    Colon cancer screening  Cologuard 2/2022 negative  Discussed colonoscopy and patient agrees. Of note, she has a history of abdominal surgery and abdomen does have some deformity from prior surgery. We did discuss that sometimes colonoscopies are more difficult in these instances due to adhesions/scar tissue, she wishes to proceed at attempt at colonoscopy.  Advised her to start fiber supplementation        Orders Placed This Encounter    Ambulatory referral/consult to Endo Procedure          Thank you so much for allowing me to participate in the care of Eugenie Randolph MD  Gastroenterology   [facility]

## 2022-10-28 ENCOUNTER — PATIENT MESSAGE (OUTPATIENT)
Dept: PSYCHIATRY | Facility: CLINIC | Age: 57
End: 2022-10-28
Payer: COMMERCIAL

## 2022-12-16 ENCOUNTER — OFFICE VISIT (OUTPATIENT)
Dept: ENDOCRINOLOGY | Facility: CLINIC | Age: 57
End: 2022-12-16
Payer: COMMERCIAL

## 2022-12-16 VITALS
SYSTOLIC BLOOD PRESSURE: 140 MMHG | HEART RATE: 79 BPM | DIASTOLIC BLOOD PRESSURE: 60 MMHG | WEIGHT: 221.25 LBS | HEIGHT: 67 IN | OXYGEN SATURATION: 98 % | BODY MASS INDEX: 34.73 KG/M2

## 2022-12-16 DIAGNOSIS — E03.9 HYPOTHYROIDISM, UNSPECIFIED TYPE: ICD-10-CM

## 2022-12-16 DIAGNOSIS — E04.2 MULTIPLE THYROID NODULES: Primary | ICD-10-CM

## 2022-12-16 DIAGNOSIS — R79.89 LOW IGF-1 LEVEL: ICD-10-CM

## 2022-12-16 DIAGNOSIS — E66.9 OBESITY (BMI 30-39.9): ICD-10-CM

## 2022-12-16 PROCEDURE — 1160F RVW MEDS BY RX/DR IN RCRD: CPT | Mod: CPTII,S$GLB,, | Performed by: GENERAL ACUTE CARE HOSPITAL

## 2022-12-16 PROCEDURE — 3008F PR BODY MASS INDEX (BMI) DOCUMENTED: ICD-10-PCS | Mod: CPTII,S$GLB,, | Performed by: GENERAL ACUTE CARE HOSPITAL

## 2022-12-16 PROCEDURE — 1160F PR REVIEW ALL MEDS BY PRESCRIBER/CLIN PHARMACIST DOCUMENTED: ICD-10-PCS | Mod: CPTII,S$GLB,, | Performed by: GENERAL ACUTE CARE HOSPITAL

## 2022-12-16 PROCEDURE — 99999 PR PBB SHADOW E&M-EST. PATIENT-LVL IV: CPT | Mod: PBBFAC,,, | Performed by: GENERAL ACUTE CARE HOSPITAL

## 2022-12-16 PROCEDURE — 3008F BODY MASS INDEX DOCD: CPT | Mod: CPTII,S$GLB,, | Performed by: GENERAL ACUTE CARE HOSPITAL

## 2022-12-16 PROCEDURE — 1159F PR MEDICATION LIST DOCUMENTED IN MEDICAL RECORD: ICD-10-PCS | Mod: CPTII,S$GLB,, | Performed by: GENERAL ACUTE CARE HOSPITAL

## 2022-12-16 PROCEDURE — 4010F ACE/ARB THERAPY RXD/TAKEN: CPT | Mod: CPTII,S$GLB,, | Performed by: GENERAL ACUTE CARE HOSPITAL

## 2022-12-16 PROCEDURE — 1159F MED LIST DOCD IN RCRD: CPT | Mod: CPTII,S$GLB,, | Performed by: GENERAL ACUTE CARE HOSPITAL

## 2022-12-16 PROCEDURE — 3078F PR MOST RECENT DIASTOLIC BLOOD PRESSURE < 80 MM HG: ICD-10-PCS | Mod: CPTII,S$GLB,, | Performed by: GENERAL ACUTE CARE HOSPITAL

## 2022-12-16 PROCEDURE — 4010F PR ACE/ARB THEARPY RXD/TAKEN: ICD-10-PCS | Mod: CPTII,S$GLB,, | Performed by: GENERAL ACUTE CARE HOSPITAL

## 2022-12-16 PROCEDURE — 99204 OFFICE O/P NEW MOD 45 MIN: CPT | Mod: S$GLB,,, | Performed by: GENERAL ACUTE CARE HOSPITAL

## 2022-12-16 PROCEDURE — 99999 PR PBB SHADOW E&M-EST. PATIENT-LVL IV: ICD-10-PCS | Mod: PBBFAC,,, | Performed by: GENERAL ACUTE CARE HOSPITAL

## 2022-12-16 PROCEDURE — 99204 PR OFFICE/OUTPT VISIT, NEW, LEVL IV, 45-59 MIN: ICD-10-PCS | Mod: S$GLB,,, | Performed by: GENERAL ACUTE CARE HOSPITAL

## 2022-12-16 PROCEDURE — 3077F SYST BP >= 140 MM HG: CPT | Mod: CPTII,S$GLB,, | Performed by: GENERAL ACUTE CARE HOSPITAL

## 2022-12-16 PROCEDURE — 3077F PR MOST RECENT SYSTOLIC BLOOD PRESSURE >= 140 MM HG: ICD-10-PCS | Mod: CPTII,S$GLB,, | Performed by: GENERAL ACUTE CARE HOSPITAL

## 2022-12-16 PROCEDURE — 3078F DIAST BP <80 MM HG: CPT | Mod: CPTII,S$GLB,, | Performed by: GENERAL ACUTE CARE HOSPITAL

## 2022-12-16 RX ORDER — DEXAMETHASONE 1 MG/1
1 TABLET ORAL ONCE
Qty: 1 TABLET | Refills: 0 | Status: SHIPPED | OUTPATIENT
Start: 2022-12-16 | End: 2022-12-16

## 2022-12-16 NOTE — PROGRESS NOTES
Subjective:      Patient ID: Eugenie Najera is a 57 y.o.    Chief Complaint:  MNG, Low IGF-1; Initial visit     Patient Active Problem List   Diagnosis    Allergic rhinitis    Absolute anemia    Essential (primary) hypertension    Lipoma    Climacteric menorrhagia    Adiposity    Avitaminosis D    Thyroid disorder    Anemia    GERD (gastroesophageal reflux disease)    H. pylori infection    Lesion of hypopharynx    Lesion of larynx    Anxiety and depression    History of tobacco use    Hearing loss of left ear due to cerumen impaction    XIANG (generalized anxiety disorder)    Unspecified mood (affective) disorder    Class 1 obesity        History of Present Illness  56 YO Female w/ PMH as above that presents to the endocrine clinic for initial evaluation of MNG and Low IGF-1 levels.  Pt today reports that she recently went to a wellness clinic for symptoms of post menopausal hot flashes. During visit she was told to have hypothyroidism and started on LT4.   Pt was told her IGF-1 levels are low.  She was also started on Estradiol for hot flashes. Pt reports weight gain and fatigue that has been chronic.  Her hot flashes have improved after starting estradiol.     NO history of pituitary disease, head trauma or brain radiation.             With regards to thyroid nodules/ hypothyroidism:       No recent labs but prior TSH levels have been in the normal range     -Thyroid US 11/2021:   . Redemonstrated dominant mixed cystic and solid left thyroid nodule, minimally increased in size compared to the prior study, otherwise without suspicious features (category TR-2). Differences in measurement may be due to inter observer variability.   2. Scattered bilateral colloid cysts.       -FNA?  DENIES prior     Taking LT4 for the past month but does not know the dose.      -Compressive symptoms?  DENIES     -Thyroid Symptoms?  Fatigue   -History of radiation? DENIES   -Family history of thyroid CA?    Thyroid CA (Dad)     With  regards to Low IGF-1:     Self reports low IGF-1 levels     No levels available for my review     Denies piror pituitary disease or surgeries     No history of brain radiation     No history of head trauma     No concerns for hypopituitarism     Reports symptoms of fatigue which are non specific and could be multifactorial       ROS:   As above    Objective:     LMP 08/09/2015     There is no height or weight on file to calculate BMI.      Physical Exam           Lab Review:   Lab Results   Component Value Date    HGBA1C 5.0 05/22/2018     Lab Results   Component Value Date    CHOL 249 (H) 08/13/2020    HDL 59 08/13/2020    LDLCALC 118.0 08/13/2020    TRIG 360 (H) 08/13/2020    CHOLHDL 23.7 08/13/2020     Lab Results   Component Value Date     08/13/2020    K 4.0 08/13/2020     08/13/2020    CO2 25 08/13/2020     (H) 08/13/2020    BUN 15 08/13/2020    CREATININE 0.8 08/13/2020    CALCIUM 9.4 08/13/2020    PROT 7.8 08/13/2020    ALBUMIN 4.0 08/13/2020    BILITOT 0.4 08/13/2020    ALKPHOS 80 08/13/2020    AST 21 08/13/2020    ALT 12 08/13/2020    ANIONGAP 10 08/13/2020    ESTGFRAFRICA >60.0 08/13/2020    EGFRNONAA >60.0 08/13/2020    TSH 1.223 04/10/2019     No results found for: [id number]    Assessment and Plan     Multiple thyroid nodules    Due for repeat Thyroid US     Indications for FNA discussed     Repeat TSH       Hypothyroidism    Will check TSH and TPO to evaluate need for thyroid hormone replacement     Will monitor       Low IGF-1 level     No prior Pituitary disorder or surgeries     No brain radiation or head trauma    No concerns for hypopituitarism     Low suspicion for IGF-1 deficiency as patient has intact pituitary gland     Will send IGF-1 levels fasting at 8am      If abnormal will send complete pituitary panel to assess pituitary function     Will monitor       Obesity    Diet, exercise and lifestyle modifications discussed     Check A1C     1mg DST

## 2022-12-16 NOTE — PATIENT INSTRUCTIONS
We will repeat thyroid Ultrasound next available for you.     For now you can continue taking Levothyroxine but we will check thyroid levels and thyroid antibodies to make sure that you need to be on Thyroid hormone.     It is ok to take estradiol for hot flashes.  But do not  go above 1mg daily.     DAY 1:  We will repeat IGF-1, TSH and thyroid antibodies levels fasting at 8am     Day 1 evening:  Take 1mg dexamethasone tablet at 11pm the night prior to having blood work (Cortisol levels)     Day 2 morning:  go to the Lab to have Cortisol levels drawn at 8am  (Fasting)         Once I have results, I will contact you with next steps to follow.     If any questions feel free to contact me.     Have a nice day.     Sincerely,       Alfred Webster MD   Endocrinology   12/16/2022 3:49 PM

## 2022-12-19 ENCOUNTER — LAB VISIT (OUTPATIENT)
Dept: LAB | Facility: OTHER | Age: 57
End: 2022-12-19
Attending: GENERAL ACUTE CARE HOSPITAL
Payer: COMMERCIAL

## 2022-12-19 DIAGNOSIS — R79.89 LOW IGF-1 LEVEL: ICD-10-CM

## 2022-12-19 DIAGNOSIS — E04.2 MULTIPLE THYROID NODULES: ICD-10-CM

## 2022-12-19 DIAGNOSIS — E03.9 HYPOTHYROIDISM, UNSPECIFIED TYPE: ICD-10-CM

## 2022-12-19 LAB
THYROPEROXIDASE IGG SERPL-ACNC: <6 IU/ML
TSH SERPL DL<=0.005 MIU/L-ACNC: 1.59 UIU/ML (ref 0.4–4)

## 2022-12-19 PROCEDURE — 84443 ASSAY THYROID STIM HORMONE: CPT | Performed by: GENERAL ACUTE CARE HOSPITAL

## 2022-12-19 PROCEDURE — 86376 MICROSOMAL ANTIBODY EACH: CPT | Performed by: GENERAL ACUTE CARE HOSPITAL

## 2022-12-19 PROCEDURE — 84305 ASSAY OF SOMATOMEDIN: CPT | Performed by: GENERAL ACUTE CARE HOSPITAL

## 2022-12-19 PROCEDURE — 36415 COLL VENOUS BLD VENIPUNCTURE: CPT | Performed by: GENERAL ACUTE CARE HOSPITAL

## 2022-12-20 ENCOUNTER — LAB VISIT (OUTPATIENT)
Dept: LAB | Facility: OTHER | Age: 57
End: 2022-12-20
Attending: GENERAL ACUTE CARE HOSPITAL
Payer: COMMERCIAL

## 2022-12-20 DIAGNOSIS — R79.89 LOW IGF-1 LEVEL: ICD-10-CM

## 2022-12-20 DIAGNOSIS — E66.9 OBESITY (BMI 30-39.9): ICD-10-CM

## 2022-12-20 LAB — CORTIS SERPL-MCNC: 17.5 UG/DL (ref 4.3–22.4)

## 2022-12-20 PROCEDURE — 82542 COL CHROMOTOGRAPHY QUAL/QUAN: CPT | Performed by: GENERAL ACUTE CARE HOSPITAL

## 2022-12-20 PROCEDURE — 36415 COLL VENOUS BLD VENIPUNCTURE: CPT | Performed by: GENERAL ACUTE CARE HOSPITAL

## 2022-12-20 PROCEDURE — 82533 TOTAL CORTISOL: CPT | Performed by: GENERAL ACUTE CARE HOSPITAL

## 2022-12-23 ENCOUNTER — PATIENT MESSAGE (OUTPATIENT)
Dept: ENDOCRINOLOGY | Facility: CLINIC | Age: 57
End: 2022-12-23
Payer: COMMERCIAL

## 2022-12-23 LAB
IGF-I SERPL-MCNC: 89 NG/ML (ref 40–217)
IGF-I Z-SCORE SERPL: -0.52 SD

## 2022-12-27 ENCOUNTER — PATIENT MESSAGE (OUTPATIENT)
Dept: ENDOCRINOLOGY | Facility: CLINIC | Age: 57
End: 2022-12-27
Payer: COMMERCIAL

## 2022-12-28 LAB — DEXAMETHASONE SERPL-MCNC: <20 NG/DL

## 2023-01-07 ENCOUNTER — PATIENT MESSAGE (OUTPATIENT)
Dept: ENDOCRINOLOGY | Facility: CLINIC | Age: 58
End: 2023-01-07
Payer: COMMERCIAL

## 2023-01-19 ENCOUNTER — OFFICE VISIT (OUTPATIENT)
Dept: PSYCHIATRY | Facility: CLINIC | Age: 58
End: 2023-01-19
Payer: COMMERCIAL

## 2023-01-19 DIAGNOSIS — F39 UNSPECIFIED MOOD (AFFECTIVE) DISORDER: Primary | ICD-10-CM

## 2023-01-19 DIAGNOSIS — F41.1 GAD (GENERALIZED ANXIETY DISORDER): ICD-10-CM

## 2023-01-19 PROCEDURE — 90833 PSYTX W PT W E/M 30 MIN: CPT | Mod: 95,,, | Performed by: NURSE PRACTITIONER

## 2023-01-19 PROCEDURE — 1160F PR REVIEW ALL MEDS BY PRESCRIBER/CLIN PHARMACIST DOCUMENTED: ICD-10-PCS | Mod: CPTII,95,, | Performed by: NURSE PRACTITIONER

## 2023-01-19 PROCEDURE — 1160F RVW MEDS BY RX/DR IN RCRD: CPT | Mod: CPTII,95,, | Performed by: NURSE PRACTITIONER

## 2023-01-19 PROCEDURE — 1159F MED LIST DOCD IN RCRD: CPT | Mod: CPTII,95,, | Performed by: NURSE PRACTITIONER

## 2023-01-19 PROCEDURE — 99214 OFFICE O/P EST MOD 30 MIN: CPT | Mod: 95,,, | Performed by: NURSE PRACTITIONER

## 2023-01-19 PROCEDURE — 90833 PR PSYCHOTHERAPY W/PATIENT W/E&M, 30 MIN (ADD ON): ICD-10-PCS | Mod: 95,,, | Performed by: NURSE PRACTITIONER

## 2023-01-19 PROCEDURE — 99214 PR OFFICE/OUTPT VISIT, EST, LEVL IV, 30-39 MIN: ICD-10-PCS | Mod: 95,,, | Performed by: NURSE PRACTITIONER

## 2023-01-19 PROCEDURE — 1159F PR MEDICATION LIST DOCUMENTED IN MEDICAL RECORD: ICD-10-PCS | Mod: CPTII,95,, | Performed by: NURSE PRACTITIONER

## 2023-01-19 NOTE — PROGRESS NOTES
Outpatient Psychiatry Follow-Up Visit    Clinical Status of Patient: Outpatient (Virtual)  01/19/2023     The patient location is: [address] [place] [id number]  [phone] (W)  The patient location [place] is: [facility]  The patient phone number is:[phone] (F)  Visit type: Virtual visit with synchronous audio and video  Each patient to whom he or she provides medical services by telemedicine is:  (1) informed of the relationship between the physician and patient and the respective role of any other health care provider with respect to management of the patient; and (2) notified that he or she may decline to receive medical services by telemedicine and may withdraw from such care at any time.       Chief Complaint: Pt is a 53 year old female  who presents today for a follow-up. Met with patient.       Interval History and Content of Current Session:  Interim Events/Subjective Report/Content of Current Session:  follow up appointment.    Pt is a 55 yo female  with past psychiatric hx of XIANG, mood disorder NOS who presents for follow up treatment. She is currently taking Zoloft 150 mg QD (increase from 75mg qd in 1/20),  Vistaril 25 QHS PRN, Trazodone 50mg QHS (started during her visit 1/20, has only  Meds tolerated well and provide good relief of symptoms overall.    Between visits, pt reports that she has been doing relatively well overall. She has added stress from a new job but is managing well. Mood is good - denies any major decompensations of depression since last visit. Sleeping fairly well. Pt stable, high functioning.     Past Psychiatric hx: Pt is a  53 year old female who established care with me 05/19. Referral from Dr. Guo who has been managing her psychiatric medications. She came to me taking Zoloft 50 mg QD, Buspirone 10mg BID, Xanax 0.25 mg QD PRN, Vistaril 25 mg QHS for sleep. During her initial eval, pet met criteria for XIANG and I increased her Zoloft to 75 mg QD. No other med  "changes were made. Since increasing Zoloft, pt reports an overall decrease in anxiety levels. She reports less generalized and ruminative worry. She states "anxiety attacks have really decreased." She recently went on a trip where she almost missed her flight, and handled this surprisingly well. Klonopin worked well initially, but she feels that this has lost effect with time.  Pt speaks to having achieved moderate results from these meds so far. Reports first being seen by psych in 2001 following a divorce, in which she was prescribed Paxil with good results.     Hx:  "I feel like my anxiety has been getting worse. I feel like I'm going to jump through my skin. I have a very high stress job that requires me to meet deadlines and perform, and the anxiety has been getting in the way of that"     Pt speaks to a long standing history of anxiety and some depression. She states "there probably have been some events that have made things worse." Pt reports recently ending a long-term relationship, while almost simultaneously discovering her father had two strokes. Also, during this time, her son came out as lovell. She has accepted this, although initially was a major stressor her. Notes periods of apathy, lack of motivation, low mood. Energy and concentration also dec'd during these times.     Pt's anxiety has started interfering with her daily functioning and ability to perform at work. She describes herself as a "wrorrier at times". She frequently feels scatter-brained and forgetful. She endorses generalized and ruminative worry about "wanting to run away." She struggles with feelings of restlessness "on edge, tense". She has noticed increased muscle tension in her neck and often clinches her jaw. Reports some fatigue, "I struggle to make it through the day sometimes". Endorses "anxiety attacks" due to a buildup of situational stress, but denies panic attacks.      Past Medical hx:   Past Medical History:   Diagnosis Date "    Anemia     patient states has resilved    Hypertension     PONV (postoperative nausea and vomiting)     Thyroid disorder         Interim hx:  Medication changes last visit: none  Anxiety: inc'd  Depression: denies     Denies suicidal/homicidal ideations.  Denies hopelessness/worthlessness.    Denies auditory/visual hallucinations      Tobacco: never   Alcohol: 3-5 glasses wine weekly, social   Drug use: denies  Caffeine: 2 soft drinks daily  Review of Systems   PSYCHIATRIC: Pertinent items are noted in the narrative.        CONSTITUTIONAL: weight stable        M/S: no pain today         ENT: no allergies noted today        ABD: no n/v/d     Past Medical, Family and Social History: The patient's past medical, family and social history have been reviewed and updated as appropriate within the electronic medical record. See encounter notes.     Medication: Zoloft 75 mg QD, Buspirone 10mg BID, Klonopin 0.5 mg QD PRN, Vistaril 25 mg QHS for sleep     Compliance: yes      Side effects: tolerates     Risk Parameters:  Patient reports no suicidal ideation  Patient reports no homicidal ideation  Patient reports no self-injurious behavior  Patient reports no violent behavior     Exam (detailed: at least 9 elements; comprehensive: all 15 elements)   Constitutional  Vitals:  Most recent vital signs, dated less than 90 days prior to this appointment, were reviewed.   General:  unremarkable, age appropriate, casual attire, good eye contact, good rapport       Musculoskeletal  Muscle Strength/Tone:  no flaccidity, no tremor    Gait & Station:  normal      Psychiatric                       Speech:  normal tone, normal rate, rhythm, and volume   Mood & Affect:   Euthymic, congruent, appropriate         Thought Process:   Goal directed; Linear    Associations:   intact   Thought Content:   No SI/HI, delusions, or paranoia, no AV/VH   Insight & Judgement:   Good, adequate to circumstances   Orientation:   grossly intact; alert and  oriented x 4    Memory:  intact for content of interview    Language:  grossly intact, can repeat    Attention Span  : Grossly intact for content of interview   Fund of Knowledge:   intact and appropriate to age and level of education        Assessment and Diagnosis   Status/Progress: Based on the examination today, the patient's problem(s) is/are under fair control.  New problems have not been presented today. Comorbidities are not currently complicating management of the primary condition.      Impression:    Pt is a 53 yo female  with past psychiatric hx of XIANG, mood disorder NOS who presents for follow up treatment. She is currently taking Zoloft 150 mg QD (increase from 75mg qd in 1/20),  Vistaril 25 QHS PRN, Trazodone 50mg QHS (started during her visit 1/20, has only  Meds tolerated well and provide good relief of symptoms overall.    Between visits, pt reports that she has been doing relatively well overall. She has added stress from a new job but is managing well. Mood is good - denies any major decompensations of depression since last visit. Sleeping fairly well. Pt stable, high functioning.      Diagnosis: XIANG, mood disorder unspecified    Intervention/Counseling/Treatment Plan   Medication Management:      1) Cont Zoloft 150mg QD anxiety. Discussed potential for GI side effects, sexual dysfunction, mood destabilization, headaches  2) ContTrazodone 50mg QHS for sleep.  5) Cont Vistaril 25 mg QHS for sleep      6. Call to report any worsening of symptoms or problems with the medication. Pt instructed to go to ER with thoughts of harming self, others     7. Patient given contact # for psychotherapists at [facility] [place] and also instructed she may check with insurance for list of providers.      8. Labs: no new orders    Psychotherapy:   Target symptoms: anxiety, depression  Why chosen therapy is appropriate versus another modality: relevant to diagnosis, patient responds to this modality  Outcome  monitoring methods: self-report, observation, feedback from family   Therapeutic intervention type: supportive psychotherapy  Topics discussed/themes: building skills sets for symptom management, symptom recognition, nutrition, exercise  The patient's response to the intervention is accepting. The patient's progress toward treatment goals is positive progress.  Duration of intervention: 20 minutes     Return to clinic: 3 months self sched    -Spent 30min face to face with the pt; >50% time spent in counseling   -Supportive therapy and psychoeducation provided  -R/B/SE's of medications discussed with the pt who expresses understanding and chooses to take medications as prescribed.   -Pt instructed to call clinic, 911 or go to nearest emergency room if sxs worsen or pt is in   crisis. The pt expresses understanding.    Baljit Rubin, NP

## 2023-06-09 RX ORDER — TRAZODONE HYDROCHLORIDE 50 MG/1
TABLET ORAL
Qty: 90 TABLET | Refills: 0 | Status: SHIPPED | OUTPATIENT
Start: 2023-06-09 | End: 2023-09-18

## 2023-06-12 ENCOUNTER — TELEPHONE (OUTPATIENT)
Dept: PSYCHIATRY | Facility: CLINIC | Age: 58
End: 2023-06-12
Payer: COMMERCIAL

## 2023-06-12 NOTE — TELEPHONE ENCOUNTER
KRISTINA in attempt to confirm pt virtual appointment for 6/14/23 at 3:30 pm with Gianni Rubin. LM for pt to call clinic back whenever she gets a chance.

## 2023-06-14 ENCOUNTER — PATIENT MESSAGE (OUTPATIENT)
Dept: PSYCHIATRY | Facility: CLINIC | Age: 58
End: 2023-06-14
Payer: COMMERCIAL

## 2023-06-20 ENCOUNTER — OFFICE VISIT (OUTPATIENT)
Dept: PSYCHIATRY | Facility: CLINIC | Age: 58
End: 2023-06-20
Payer: COMMERCIAL

## 2023-06-20 DIAGNOSIS — F39 UNSPECIFIED MOOD (AFFECTIVE) DISORDER: Primary | ICD-10-CM

## 2023-06-20 DIAGNOSIS — F41.1 GAD (GENERALIZED ANXIETY DISORDER): ICD-10-CM

## 2023-06-20 PROBLEM — F41.9 ANXIETY AND DEPRESSION: Status: RESOLVED | Noted: 2019-04-09 | Resolved: 2023-06-20

## 2023-06-20 PROBLEM — F32.A ANXIETY AND DEPRESSION: Status: RESOLVED | Noted: 2019-04-09 | Resolved: 2023-06-20

## 2023-06-20 PROCEDURE — 1159F MED LIST DOCD IN RCRD: CPT | Mod: CPTII,95,, | Performed by: NURSE PRACTITIONER

## 2023-06-20 PROCEDURE — 99214 OFFICE O/P EST MOD 30 MIN: CPT | Mod: 95,,, | Performed by: NURSE PRACTITIONER

## 2023-06-20 PROCEDURE — 90833 PR PSYCHOTHERAPY W/PATIENT W/E&M, 30 MIN (ADD ON): ICD-10-PCS | Mod: 95,,, | Performed by: NURSE PRACTITIONER

## 2023-06-20 PROCEDURE — 99214 PR OFFICE/OUTPT VISIT, EST, LEVL IV, 30-39 MIN: ICD-10-PCS | Mod: 95,,, | Performed by: NURSE PRACTITIONER

## 2023-06-20 PROCEDURE — 4010F PR ACE/ARB THEARPY RXD/TAKEN: ICD-10-PCS | Mod: CPTII,95,, | Performed by: NURSE PRACTITIONER

## 2023-06-20 PROCEDURE — 1159F PR MEDICATION LIST DOCUMENTED IN MEDICAL RECORD: ICD-10-PCS | Mod: CPTII,95,, | Performed by: NURSE PRACTITIONER

## 2023-06-20 PROCEDURE — 90833 PSYTX W PT W E/M 30 MIN: CPT | Mod: 95,,, | Performed by: NURSE PRACTITIONER

## 2023-06-20 PROCEDURE — 1160F RVW MEDS BY RX/DR IN RCRD: CPT | Mod: CPTII,95,, | Performed by: NURSE PRACTITIONER

## 2023-06-20 PROCEDURE — 1160F PR REVIEW ALL MEDS BY PRESCRIBER/CLIN PHARMACIST DOCUMENTED: ICD-10-PCS | Mod: CPTII,95,, | Performed by: NURSE PRACTITIONER

## 2023-06-20 PROCEDURE — 4010F ACE/ARB THERAPY RXD/TAKEN: CPT | Mod: CPTII,95,, | Performed by: NURSE PRACTITIONER

## 2023-06-20 NOTE — PROGRESS NOTES
Outpatient Psychiatry Follow-Up Visit    Clinical Status of Patient: Outpatient (Virtual)  06/20/2023     The patient location is: [address] [place] [id number]  [phone] (Q)  The patient location [place] is: [facility]  The patient phone number is:[phone] (N)  Visit type: Virtual visit with synchronous audio and video  Each patient to whom he or she provides medical services by telemedicine is:  (1) informed of the relationship between the physician and patient and the respective role of any other health care provider with respect to management of the patient; and (2) notified that he or she may decline to receive medical services by telemedicine and may withdraw from such care at any time.       Chief Complaint: Pt is a 57 year old female  who presents today for a follow-up. Met with patient.       Interval History and Content of Current Session:  Interim Events/Subjective Report/Content of Current Session:  follow up appointment.    Pt is a 58 yo female  with past psychiatric hx of XIANG, mood disorder NOS who presents for follow up treatment. She is currently taking Zoloft 150 mg QD (increase from 75mg qd in 1/20),  Vistaril 25 QHS PRN, Trazodone 50mg QHS (started during her visit 1/20, has only  Meds tolerated well and provide good relief of symptoms overall.    Since last visit, pt reports some continued life stress secondary to some relationship stressors. She has been attending couples therapy which has been exacerbating her anxiety. Despite this,She notes mild, ongoing generalized worrying. Her mood is stable and she denies any overt symptoms of depression today. She denies anhedonia or hopelessness. She is sleeping well with a good appetite. Pt is stable and high functioning.    Past Psychiatric hx: Pt is a  53 year old female who established care with me 05/19. Referral from Dr. Guo who has been managing her psychiatric medications. She came to me taking Zoloft 50 mg QD, Buspirone 10mg  "BID, Xanax 0.25 mg QD PRN, Vistaril 25 mg QHS for sleep. During her initial eval, pet met criteria for XIANG and I increased her Zoloft to 75 mg QD. No other med changes were made. Since increasing Zoloft, pt reports an overall decrease in anxiety levels. She reports less generalized and ruminative worry. She states "anxiety attacks have really decreased." She recently went on a trip where she almost missed her flight, and handled this surprisingly well. Klonopin worked well initially, but she feels that this has lost effect with time.  Pt speaks to having achieved moderate results from these meds so far. Reports first being seen by psych in 2001 following a divorce, in which she was prescribed Paxil with good results.     Hx:  "I feel like my anxiety has been getting worse. I feel like I'm going to jump through my skin. I have a very high stress job that requires me to meet deadlines and perform, and the anxiety has been getting in the way of that"     Pt speaks to a long standing history of anxiety and some depression. She states "there probably have been some events that have made things worse." Pt reports recently ending a long-term relationship, while almost simultaneously discovering her father had two strokes. Also, during this time, her son came out as lovell. She has accepted this, although initially was a major stressor her. Notes periods of apathy, lack of motivation, low mood. Energy and concentration also dec'd during these times.     Pt's anxiety has started interfering with her daily functioning and ability to perform at work. She describes herself as a "wrorrier at times". She frequently feels scatter-brained and forgetful. She endorses generalized and ruminative worry about "wanting to run away." She struggles with feelings of restlessness "on edge, tense". She has noticed increased muscle tension in her neck and often clinches her jaw. Reports some fatigue, "I struggle to make it through the day " "sometimes". Endorses "anxiety attacks" due to a buildup of situational stress, but denies panic attacks.      Past Medical hx:   Past Medical History:   Diagnosis Date    Anemia     patient states has resilved    Hypertension     PONV (postoperative nausea and vomiting)     Thyroid disorder         Interim hx:  Medication changes last visit: none  Anxiety: inc'd  Depression: denies     Denies suicidal/homicidal ideations.  Denies hopelessness/worthlessness.    Denies auditory/visual hallucinations      Tobacco: never   Alcohol: 3-5 glasses wine weekly, social   Drug use: denies  Caffeine: 2 soft drinks daily  Review of Systems   PSYCHIATRIC: Pertinent items are noted in the narrative.        CONSTITUTIONAL: weight stable        M/S: no pain today         ENT: no allergies noted today        ABD: no n/v/d     Past Medical, Family and Social History: The patient's past medical, family and social history have been reviewed and updated as appropriate within the electronic medical record. See encounter notes.       Compliance: yes      Side effects: tolerates     Risk Parameters:  Patient reports no suicidal ideation  Patient reports no homicidal ideation  Patient reports no self-injurious behavior  Patient reports no violent behavior     Exam (detailed: at least 9 elements; comprehensive: all 15 elements)   Constitutional  Vitals:  Most recent vital signs, dated less than 90 days prior to this appointment, were reviewed.   General:  unremarkable, age appropriate, casual attire, good eye contact, good rapport       Musculoskeletal  Muscle Strength/Tone:  no flaccidity, no tremor    Gait & Station:  normal      Psychiatric                       Speech:  normal tone, normal rate, rhythm, and volume   Mood & Affect:   Euthymic, congruent, appropriate         Thought Process:   Goal directed; Linear    Associations:   intact   Thought Content:   No SI/HI, delusions, or paranoia, no AV/VH   Insight & Judgement:   Good, " adequate to circumstances   Orientation:   grossly intact; alert and oriented x 4    Memory:  intact for content of interview    Language:  grossly intact, can repeat    Attention Span  : Grossly intact for content of interview   Fund of Knowledge:   intact and appropriate to age and level of education        Assessment and Diagnosis   Status/Progress: Based on the examination today, the patient's problem(s) is/are under fair control.  New problems have not been presented today. Comorbidities are not currently complicating management of the primary condition.      Impression:    Pt is a 58 yo female  with past psychiatric hx of XIANG, mood disorder NOS who presents for follow up treatment. She is currently taking Zoloft 150 mg QD (increase from 75mg qd in 1/20),  Vistaril 25 QHS PRN, Trazodone 50mg QHS (started during her visit 1/20, has only  Meds tolerated well and provide good relief of symptoms overall.    Since last visit, pt reports some continued life stress secondary to some relationship stressors. She has been attending couples therapy which has been exacerbating her anxiety. She notes mild, ongoing generalized worrying. Her mood is stable and she denies any overt symptoms of depression today. She denies anhedonia or hopelessness. She is sleeping well with a good appetite. Pt is stable and high functioning.    Diagnosis: XIANG, mood disorder unspecified    Intervention/Counseling/Treatment Plan   Medication Management:      1) Cont Zoloft 150mg QD anxiety. Discussed potential for GI side effects, sexual dysfunction, mood destabilization, headaches  2) ContTrazodone 50mg QHS for sleep.  5) Cont Vistaril 25 mg QHS for sleep      6. Call to report any worsening of symptoms or problems with the medication. Pt instructed to go to ER with thoughts of harming self, others     7. Patient given contact # for psychotherapists at [facility] [place] and also instructed she may check with insurance for list of providers.       8. Labs: no new orders    Psychotherapy:   Target symptoms: anxiety, depression  Why chosen therapy is appropriate versus another modality: relevant to diagnosis, patient responds to this modality  Outcome monitoring methods: self-report, observation, feedback from family   Therapeutic intervention type: supportive psychotherapy  Topics discussed/themes: building skills sets for symptom management, symptom recognition, nutrition, exercise  The patient's response to the intervention is accepting. The patient's progress toward treatment goals is positive progress.  Duration of intervention: 20 minutes     Return to clinic: 6 months self sched    -Spent 30min face to face with the pt; >50% time spent in counseling   -Supportive therapy and psychoeducation provided  -R/B/SE's of medications discussed with the pt who expresses understanding and chooses to take medications as prescribed.   -Pt instructed to call clinic, 911 or go to nearest emergency room if sxs worsen or pt is in   crisis. The pt expresses understanding.    Baljit Rubin, NP

## 2023-07-24 ENCOUNTER — OFFICE VISIT (OUTPATIENT)
Dept: FAMILY MEDICINE | Facility: CLINIC | Age: 58
End: 2023-07-24
Payer: COMMERCIAL

## 2023-07-24 VITALS
OXYGEN SATURATION: 98 % | HEART RATE: 88 BPM | BODY MASS INDEX: 32.08 KG/M2 | SYSTOLIC BLOOD PRESSURE: 132 MMHG | DIASTOLIC BLOOD PRESSURE: 84 MMHG | HEIGHT: 67 IN | WEIGHT: 204.38 LBS

## 2023-07-24 DIAGNOSIS — Z13.1 SCREENING FOR DIABETES MELLITUS: ICD-10-CM

## 2023-07-24 DIAGNOSIS — Z12.39 ENCOUNTER FOR SCREENING FOR MALIGNANT NEOPLASM OF BREAST, UNSPECIFIED SCREENING MODALITY: ICD-10-CM

## 2023-07-24 DIAGNOSIS — Z00.00 ANNUAL PHYSICAL EXAM: Primary | ICD-10-CM

## 2023-07-24 DIAGNOSIS — Z13.220 SCREENING FOR LIPID DISORDERS: ICD-10-CM

## 2023-07-24 DIAGNOSIS — Z11.59 ENCOUNTER FOR HEPATITIS C SCREENING TEST FOR LOW RISK PATIENT: ICD-10-CM

## 2023-07-24 DIAGNOSIS — Z13.29 SCREENING FOR THYROID DISORDER: ICD-10-CM

## 2023-07-24 DIAGNOSIS — I10 HYPERTENSION, UNSPECIFIED TYPE: ICD-10-CM

## 2023-07-24 PROCEDURE — 99999 PR PBB SHADOW E&M-EST. PATIENT-LVL IV: ICD-10-PCS | Mod: PBBFAC,,, | Performed by: INTERNAL MEDICINE

## 2023-07-24 PROCEDURE — 3075F PR MOST RECENT SYSTOLIC BLOOD PRESS GE 130-139MM HG: ICD-10-PCS | Mod: CPTII,S$GLB,, | Performed by: INTERNAL MEDICINE

## 2023-07-24 PROCEDURE — 4010F PR ACE/ARB THEARPY RXD/TAKEN: ICD-10-PCS | Mod: CPTII,S$GLB,, | Performed by: INTERNAL MEDICINE

## 2023-07-24 PROCEDURE — 3075F SYST BP GE 130 - 139MM HG: CPT | Mod: CPTII,S$GLB,, | Performed by: INTERNAL MEDICINE

## 2023-07-24 PROCEDURE — 3008F BODY MASS INDEX DOCD: CPT | Mod: CPTII,S$GLB,, | Performed by: INTERNAL MEDICINE

## 2023-07-24 PROCEDURE — 99396 PREV VISIT EST AGE 40-64: CPT | Mod: S$GLB,,, | Performed by: INTERNAL MEDICINE

## 2023-07-24 PROCEDURE — 1159F MED LIST DOCD IN RCRD: CPT | Mod: CPTII,S$GLB,, | Performed by: INTERNAL MEDICINE

## 2023-07-24 PROCEDURE — 1160F RVW MEDS BY RX/DR IN RCRD: CPT | Mod: CPTII,S$GLB,, | Performed by: INTERNAL MEDICINE

## 2023-07-24 PROCEDURE — 99396 PR PREVENTIVE VISIT,EST,40-64: ICD-10-PCS | Mod: S$GLB,,, | Performed by: INTERNAL MEDICINE

## 2023-07-24 PROCEDURE — 3008F PR BODY MASS INDEX (BMI) DOCUMENTED: ICD-10-PCS | Mod: CPTII,S$GLB,, | Performed by: INTERNAL MEDICINE

## 2023-07-24 PROCEDURE — 99999 PR PBB SHADOW E&M-EST. PATIENT-LVL IV: CPT | Mod: PBBFAC,,, | Performed by: INTERNAL MEDICINE

## 2023-07-24 PROCEDURE — 3079F DIAST BP 80-89 MM HG: CPT | Mod: CPTII,S$GLB,, | Performed by: INTERNAL MEDICINE

## 2023-07-24 PROCEDURE — 4010F ACE/ARB THERAPY RXD/TAKEN: CPT | Mod: CPTII,S$GLB,, | Performed by: INTERNAL MEDICINE

## 2023-07-24 PROCEDURE — 1160F PR REVIEW ALL MEDS BY PRESCRIBER/CLIN PHARMACIST DOCUMENTED: ICD-10-PCS | Mod: CPTII,S$GLB,, | Performed by: INTERNAL MEDICINE

## 2023-07-24 PROCEDURE — 1159F PR MEDICATION LIST DOCUMENTED IN MEDICAL RECORD: ICD-10-PCS | Mod: CPTII,S$GLB,, | Performed by: INTERNAL MEDICINE

## 2023-07-24 PROCEDURE — 3079F PR MOST RECENT DIASTOLIC BLOOD PRESSURE 80-89 MM HG: ICD-10-PCS | Mod: CPTII,S$GLB,, | Performed by: INTERNAL MEDICINE

## 2023-07-24 RX ORDER — DIPHENHYDRAMINE HCL 25 MG
25 CAPSULE ORAL
COMMUNITY
End: 2024-02-15

## 2023-07-24 RX ORDER — SERTRALINE HYDROCHLORIDE 100 MG/1
TABLET, FILM COATED ORAL
Qty: 135 TABLET | Refills: 1 | Status: SHIPPED | OUTPATIENT
Start: 2023-07-24 | End: 2024-03-18

## 2023-07-24 NOTE — PROGRESS NOTES
Patient ID: Eugenie Najera is a 57 y.o. female.    Chief Complaint: Annual Exam (wellness)        Assessment and Plan      1. Screening for diabetes mellitus    2. Hypertension, unspecified type    3. Screening for lipid disorders    4. Screening for thyroid disorder    5. Encounter for hepatitis C screening test for low risk patient    6. Encounter for screening for malignant neoplasm of breast, unspecified screening modality       Update health maintenance  Check labs  Continue current medications     HPI     Generalized anxiety disorder/mood disorder- following with Dr. Rubin.     Hypertension- not checking at home. Controlled in clinic     Obesity- losing intentionally.     HRT- taking compounded estrogen.     Annual    Labs- due.   Diet- good  Exercise- walking  Alcohol- occasional   Tobacco- none   HM- reviewed.     Review of Systems   Constitutional:  Negative for fever.   Respiratory:  Negative for shortness of breath.    Cardiovascular:  Negative for chest pain.   Gastrointestinal:  Negative for abdominal pain.      Objective     Vitals:    07/24/23 1549   BP: 132/84   Pulse: 88     Wt Readings from Last 3 Encounters:   07/24/23 1549 92.7 kg (204 lb 5.9 oz)   12/16/22 1524 100.4 kg (221 lb 3.7 oz)   10/27/22 0924 100.9 kg (222 lb 7.1 oz)      Body mass index is 32.01 kg/m².   Physical Exam  Cardiovascular:      Rate and Rhythm: Normal rate and regular rhythm.      Heart sounds: No murmur heard.    No gallop.   Pulmonary:      Breath sounds: Normal breath sounds. No wheezing or rhonchi.   Abdominal:      Palpations: Abdomen is soft.      Tenderness: There is no abdominal tenderness.        Orders     Eugenie was seen today for annual exam.    Diagnoses and all orders for this visit:    Annual physical exam    Screening for diabetes mellitus  -     Hemoglobin A1C; Future    Hypertension, unspecified type  -     CBC Auto Differential; Future  -     Comprehensive Metabolic Panel; Future    Screening for lipid  disorders  -     Lipid Panel; Future    Screening for thyroid disorder  -     TSH; Future    Encounter for hepatitis C screening test for low risk patient  -     Hepatitis C Antibody; Future    Encounter for screening for malignant neoplasm of breast, unspecified screening modality  -     Mammo Digital Screening Bilat w/ Nura; Future         Medication List with Changes/Refills   Current Medications    ACETAMINOPHEN (TYLENOL) 500 MG TABLET    Take 1,000 mg by mouth daily as needed. As directed on package    DIPHENHYDRAMINE (BENADRYL) 25 MG CAPSULE    Take 25 mg by mouth.    ESTRADIOL 0.05 MG/24 HR TD PTWK 0.05 MG/24 HR PTWK    Place 0.05 mg onto the skin.    HYDROCHLOROTHIAZIDE (HYDRODIURIL) 25 MG TABLET    Take 1 tablet by mouth once daily    LISINOPRIL (PRINIVIL,ZESTRIL) 20 MG TABLET    Take 20 mg by mouth once daily.    MUPIROCIN (BACTROBAN) 2 % OINTMENT    Apply to affected area 3 times daily x 7 days    SERTRALINE (ZOLOFT) 100 MG TABLET    TAKE 1 AND 1/2 TABLETS BY MOUTH ONCE DAILY    TRAZODONE (DESYREL) 50 MG TABLET    TAKE 1 TABLET BY MOUTH EVERY EVENING.   Discontinued Medications    HYDROXYZINE HCL (ATARAX) 25 MG TABLET    Take 1 tablet (25 mg total) by mouth once daily.       I personally reviewed past medical, family and social history.    Patient Active Problem List   Diagnosis    Allergic rhinitis    Absolute anemia    Essential (primary) hypertension    Lipoma    Climacteric menorrhagia    Adiposity    Avitaminosis D    Thyroid disorder    Anemia    GERD (gastroesophageal reflux disease)    H. pylori infection    Lesion of hypopharynx    Lesion of larynx    History of tobacco use    Hearing loss of left ear due to cerumen impaction    XIANG (generalized anxiety disorder)    Unspecified mood (affective) disorder    Class 1 obesity

## 2023-07-25 ENCOUNTER — PATIENT OUTREACH (OUTPATIENT)
Dept: ADMINISTRATIVE | Facility: HOSPITAL | Age: 58
End: 2023-07-25
Payer: COMMERCIAL

## 2023-07-25 NOTE — PROGRESS NOTES
Population Health Chart Review & Patient Outreach Details:     Reason for Outreach Encounter:     []  Non-Compliant Report   []  Payor Report ([employer], PHN, BCBS, MSSP, MCIP, UHC, etc.)   []  Pre-Visit Chart Review     Updates Requested / Reviewed:     [x]  Care Everywhere    [x]     []  External Sources ([facility], [facility], DIS, etc.)   []  Care Team Updated    Patient Outreach Method:    []  Telephone Outreach Completed   [] Successful   [] Left Voicemail   [] Unable to Contact (wrong number, no voicemail)  []  [employer] [facility] Outreach Sent  []  Letter Outreach Mailed  []  Fax Sent for External Records  [x]  External Records Upload    Health Maintenance Topics Addressed and Outreach Outcomes / Actions Taken:        []      Breast Cancer Screening []  Mammo Scheduled      []  External Records Requested     []  Added Reminder to Complete to Upcoming Primary Care Appt Notes     []  Patient Declined     []  Patient Will Call Back to Schedule     []  Patient Will Schedule with External Provider / Order Routed if Applicable             []       Cervical Cancer Screening []  Pap Scheduled      []  External Records Requested     []  Added Reminder to Complete to Upcoming Primary Care Appt Notes     []  Patient Declined     []  Patient Will Call Back to Schedule     []  Patient Will Schedule with External Provider               [x]          Colorectal Cancer Screening []  Colonoscopy Case Request or Referral Placed     [x]  External Records Uploaded     []  Added Reminder to Complete to Upcoming Primary Care Appt Notes     []  Patient Declined     []  Patient Will Call Back to Schedule     []  Patient Will Schedule with External Provider     []  Fit Kit Mailed (add the SmartPhrase under additional notes)     []  Reminded Patient to Complete Home Test             []      Diabetic Eye Exam []  Eye Camera Scheduled or Optometry Referral Placed     []  External Records Requested     []  Added Reminder to Complete to  Upcoming Primary Care Appt Notes     []  Patient Declined     []  Patient Will Call Back to Schedule     []  Patient Will Schedule with External Provider             []      Blood Pressure Control []  Primary Care Follow Up Visit Scheduled     []  Remote Blood Pressure Reading Captured     []  Added Reminder to Complete to Upcoming Primary Care Appt Notes     []  Patient Declined     []  Patient Will Call Back / Patient Will Send Portal Message with Reading     []  Patient Will Call Back to Schedule Provider Visit             []       HbA1c & Other Labs []  Lab Appt Scheduled for Due Labs     []  Primary Care Follow Up Visit Scheduled      []  Reminded Patient to Complete Home Test     []  Added Reminder to Complete to Upcoming Primary Care Appt Notes     []  Patient Declined     []  Patient Will Call Back to Schedule     []  Patient Will Schedule with External Provider / Order Routed if Applicable           []    Schedule Primary Care Appt []  Primary Care Appt Scheduled     []  Patient Declined     []  Patient Will Call Back to Schedule     []  Pt Established with External Provider & Updated Care Team             []      Medication Adherence []  Primary Care Appointment Scheduled     []  Added Reminder to Upcoming Primary Care Appt Notes     []  Patient Reminded to  Prescription     []  Patient Declined, Provider Notified if Needed     []  Sent Provider Message to Review and/or Add Exclusion to Problem List             []      Osteoporosis Screening []  DXA Appointment Scheduled     []  External Records Requested     []  Added Reminder to Complete to Upcoming Primary Care Appt Notes     []  Patient Declined     []  Patient Will Call Back to Schedule     []  Patient Will Schedule with External Provider / Order Routed if Applicable     Additional Care Coordinator Notes:         Further Action Needed If Patient Returns Outreach:

## 2023-08-03 ENCOUNTER — HOSPITAL ENCOUNTER (OUTPATIENT)
Dept: RADIOLOGY | Facility: OTHER | Age: 58
Discharge: HOME | End: 2023-08-03
Attending: INTERNAL MEDICINE
Payer: COMMERCIAL

## 2023-08-03 DIAGNOSIS — Z12.39 ENCOUNTER FOR SCREENING FOR MALIGNANT NEOPLASM OF BREAST, UNSPECIFIED SCREENING MODALITY: ICD-10-CM

## 2023-08-03 DIAGNOSIS — Z12.31 ENCOUNTER FOR SCREENING MAMMOGRAM FOR BREAST CANCER: ICD-10-CM

## 2023-08-03 PROCEDURE — 77063 MAMMO DIGITAL SCREENING BILAT WITH TOMO: ICD-10-PCS | Mod: 26,,, | Performed by: RADIOLOGY

## 2023-08-03 PROCEDURE — 77067 SCR MAMMO BI INCL CAD: CPT | Mod: 26,,, | Performed by: RADIOLOGY

## 2023-08-03 PROCEDURE — 77063 BREAST TOMOSYNTHESIS BI: CPT | Mod: 26,,, | Performed by: RADIOLOGY

## 2023-08-03 PROCEDURE — 77067 MAMMO DIGITAL SCREENING BILAT WITH TOMO: ICD-10-PCS | Mod: 26,,, | Performed by: RADIOLOGY

## 2023-08-03 PROCEDURE — 77067 SCR MAMMO BI INCL CAD: CPT | Mod: TC

## 2023-08-16 DIAGNOSIS — I10 ESSENTIAL (PRIMARY) HYPERTENSION: ICD-10-CM

## 2023-08-16 RX ORDER — HYDROCHLOROTHIAZIDE 25 MG/1
25 TABLET ORAL DAILY
Qty: 90 TABLET | Refills: 0 | Status: SHIPPED | OUTPATIENT
Start: 2023-08-16 | End: 2023-11-14

## 2023-08-16 NOTE — TELEPHONE ENCOUNTER
No care due was identified.  [facility] Embedded Care Due Messages. Reference number: [id number].   8/16/2023 4:00:26 PM CDT   151

## 2023-09-12 NOTE — TELEPHONE ENCOUNTER
Left message for pt to call back to schedule 06/2020 follow up with Gianni Rubin NP   Arava Counseling:  Patient counseled regarding adverse effects of Arava including but not limited to nausea, vomiting, abnormalities in liver function tests. Patients may develop mouth sores, rash, diarrhea, and abnormalities in blood counts. The patient understands that monitoring is required including LFTs and blood counts.  There is a rare possibility of scarring of the liver and lung problems that can occur when taking methotrexate. Persistent nausea, loss of appetite, pale stools, dark urine, cough, and shortness of breath should be reported immediately. Patient advised to discontinue Arava treatment and consult with a physician prior to attempting conception. The patient will have to undergo a treatment to eliminate Arava from the body prior to conception.

## 2023-09-18 RX ORDER — TRAZODONE HYDROCHLORIDE 50 MG/1
50 TABLET ORAL NIGHTLY
Qty: 90 TABLET | Refills: 0 | Status: SHIPPED | OUTPATIENT
Start: 2023-09-18 | End: 2023-12-18

## 2023-11-14 ENCOUNTER — PATIENT MESSAGE (OUTPATIENT)
Dept: FAMILY MEDICINE | Facility: CLINIC | Age: 58
End: 2023-11-14
Payer: COMMERCIAL

## 2023-11-14 DIAGNOSIS — I10 ESSENTIAL (PRIMARY) HYPERTENSION: ICD-10-CM

## 2023-11-14 RX ORDER — HYDROCHLOROTHIAZIDE 25 MG/1
25 TABLET ORAL
Qty: 90 TABLET | Refills: 0 | Status: SHIPPED | OUTPATIENT
Start: 2023-11-14 | End: 2024-03-20

## 2023-11-14 NOTE — TELEPHONE ENCOUNTER
Care Due:                  Date            Visit Type   Department     Provider  --------------------------------------------------------------------------------                                MYCHART                              ANNUAL                              CHECKUP/PHY  [facility]  Last Visit: 07-      S            CECILE Guo                              EP -                              PRIMARY      [facility]  Next Visit: 01-      CARE ([facility])   CECILE Guo                                                            Last  Test          Frequency    Reason                     Performed    Due Date  --------------------------------------------------------------------------------    CMP.........  12 months..  hydroCHLOROthiazide......  Not Found    Overdue    [employer] Embedded Care Due Messages. Reference number: [id number].   11/14/2023 4:15:52 PM CST

## 2023-11-14 NOTE — TELEPHONE ENCOUNTER
Refill Routing Note   Medication(s) are not appropriate for processing by [facility] for the following reason(s):        Required labs outdated    [facility] action(s):  Defer     Requires labs : Yes             Appointments  past 12m or future 3m with PCP    Date Provider   Last Visit   7/24/2023 Westely Guo, DO   Next Visit   1/24/2024 Westley Guo, DO   ED visits in past 90 days: 0        Note composed:5:40 PM 11/14/2023

## 2023-12-18 RX ORDER — TRAZODONE HYDROCHLORIDE 50 MG/1
50 TABLET ORAL NIGHTLY
Qty: 90 TABLET | Refills: 0 | Status: SHIPPED | OUTPATIENT
Start: 2023-12-18 | End: 2024-02-15 | Stop reason: SDUPTHER

## 2023-12-18 NOTE — TELEPHONE ENCOUNTER
Last ordered: 3 months ago (9/18/2023) by Baljit Rubin NP     Last refill: 10/4/2023     Nov none - due for 6 month in person follow up   Lov 6/20/23

## 2024-02-08 ENCOUNTER — PATIENT MESSAGE (OUTPATIENT)
Dept: FAMILY MEDICINE | Facility: CLINIC | Age: 59
End: 2024-02-08
Payer: COMMERCIAL

## 2024-02-08 DIAGNOSIS — I10 ESSENTIAL (PRIMARY) HYPERTENSION: Primary | ICD-10-CM

## 2024-02-08 RX ORDER — LISINOPRIL 20 MG/1
20 TABLET ORAL DAILY
Qty: 90 TABLET | Refills: 1 | Status: SHIPPED | OUTPATIENT
Start: 2024-02-08 | End: 2024-05-15 | Stop reason: SDUPTHER

## 2024-02-15 ENCOUNTER — OFFICE VISIT (OUTPATIENT)
Dept: PSYCHIATRY | Facility: CLINIC | Age: 59
End: 2024-02-15
Payer: COMMERCIAL

## 2024-02-15 VITALS
SYSTOLIC BLOOD PRESSURE: 185 MMHG | DIASTOLIC BLOOD PRESSURE: 91 MMHG | WEIGHT: 215.19 LBS | HEIGHT: 66 IN | BODY MASS INDEX: 34.58 KG/M2 | HEART RATE: 69 BPM

## 2024-02-15 DIAGNOSIS — F41.1 GAD (GENERALIZED ANXIETY DISORDER): ICD-10-CM

## 2024-02-15 DIAGNOSIS — F39 UNSPECIFIED MOOD (AFFECTIVE) DISORDER: Primary | ICD-10-CM

## 2024-02-15 PROCEDURE — 4010F ACE/ARB THERAPY RXD/TAKEN: CPT | Mod: CPTII,S$GLB,, | Performed by: NURSE PRACTITIONER

## 2024-02-15 PROCEDURE — 3077F SYST BP >= 140 MM HG: CPT | Mod: CPTII,S$GLB,, | Performed by: NURSE PRACTITIONER

## 2024-02-15 PROCEDURE — 90833 PSYTX W PT W E/M 30 MIN: CPT | Mod: S$GLB,,, | Performed by: NURSE PRACTITIONER

## 2024-02-15 PROCEDURE — 99214 OFFICE O/P EST MOD 30 MIN: CPT | Mod: S$GLB,,, | Performed by: NURSE PRACTITIONER

## 2024-02-15 PROCEDURE — 1159F MED LIST DOCD IN RCRD: CPT | Mod: CPTII,S$GLB,, | Performed by: NURSE PRACTITIONER

## 2024-02-15 PROCEDURE — 99999 PR PBB SHADOW E&M-EST. PATIENT-LVL III: CPT | Mod: PBBFAC,,, | Performed by: NURSE PRACTITIONER

## 2024-02-15 PROCEDURE — 3008F BODY MASS INDEX DOCD: CPT | Mod: CPTII,S$GLB,, | Performed by: NURSE PRACTITIONER

## 2024-02-15 PROCEDURE — 3080F DIAST BP >= 90 MM HG: CPT | Mod: CPTII,S$GLB,, | Performed by: NURSE PRACTITIONER

## 2024-02-15 PROCEDURE — 1160F RVW MEDS BY RX/DR IN RCRD: CPT | Mod: CPTII,S$GLB,, | Performed by: NURSE PRACTITIONER

## 2024-02-15 RX ORDER — ESOMEPRAZOLE MAGNESIUM 40 MG/1
CAPSULE, DELAYED RELEASE ORAL
COMMUNITY

## 2024-02-15 RX ORDER — TRAZODONE HYDROCHLORIDE 50 MG/1
50 TABLET ORAL NIGHTLY
Qty: 180 TABLET | Refills: 0 | Status: SHIPPED | OUTPATIENT
Start: 2024-02-15 | End: 2024-03-01 | Stop reason: SDUPTHER

## 2024-02-15 NOTE — PROGRESS NOTES
"Outpatient Psychiatry Follow-Up Visit    Clinical Status of Patient: Outpatient (Ambulatory)  02/15/2024     Chief Complaint: Pt is a 58 year old female  who presents today for a follow-up. Met with patient.       Interval History and Content of Current Session:  Interim Events/Subjective Report/Content of Current Session:  follow up appointment.    Pt is a 57 yo female  with past psychiatric hx of XIANG, mood disorder NOS who presents for follow up treatment. She is currently taking Zoloft 150 mg QD (increase from 75mg qd in 1/20),  Trazodone 50mg QHS (started during her visit 1/20, has only  Meds tolerated well and provide good relief of symptoms overall.    Pt was last seen by me around 8 months ago. Between visits, pt notes that she has been doing well. Her anxiety is minimal and she reports good stress tolerance. Denies excessive or ruminative worrying. Primary stressors include relationship stressors. Denies any decompensations of mood between visits and she is not overtly depressed today. Denies hopelessness or worthlessness. Sleep quality is good with routine use of Trazodone. While treated with trazodone, she finds it easier to fall and stay asleep. Pt is stable and high functioning.         Past Psychiatric hx: Pt is a  53 year old female who established care with me 05/19. Referral from Dr. Guo who has been managing her psychiatric medications. She came to me taking Zoloft 50 mg QD, Buspirone 10mg BID, Xanax 0.25 mg QD PRN, Vistaril 25 mg QHS for sleep. During her initial eval, pet met criteria for XIANG and I increased her Zoloft to 75 mg QD. No other med changes were made. Since increasing Zoloft, pt reports an overall decrease in anxiety levels. She reports less generalized and ruminative worry. She states "anxiety attacks have really decreased." She recently went on a trip where she almost missed her flight, and handled this surprisingly well. Klonopin worked well initially, but she feels that this " "has lost effect with time.  Pt speaks to having achieved moderate results from these meds so far. Reports first being seen by psych in 2001 following a divorce, in which she was prescribed Paxil with good results.     Hx:  "I feel like my anxiety has been getting worse. I feel like I'm going to jump through my skin. I have a very high stress job that requires me to meet deadlines and perform, and the anxiety has been getting in the way of that"     Pt speaks to a long standing history of anxiety and some depression. She states "there probably have been some events that have made things worse." Pt reports recently ending a long-term relationship, while almost simultaneously discovering her father had two strokes. Also, during this time, her son came out as lovell. She has accepted this, although initially was a major stressor her. Notes periods of apathy, lack of motivation, low mood. Energy and concentration also dec'd during these times.     Pt's anxiety has started interfering with her daily functioning and ability to perform at work. She describes herself as a "wrorrier at times". She frequently feels scatter-brained and forgetful. She endorses generalized and ruminative worry about "wanting to run away." She struggles with feelings of restlessness "on edge, tense". She has noticed increased muscle tension in her neck and often clinches her jaw. Reports some fatigue, "I struggle to make it through the day sometimes". Endorses "anxiety attacks" due to a buildup of situational stress, but denies panic attacks.      Past Medical hx:   Past Medical History:   Diagnosis Date    Anemia     patient states has resilved    Hypertension     PONV (postoperative nausea and vomiting)     Thyroid disorder         Interim hx:  Medication changes last visit: none  Anxiety: inc'd  Depression: denies     Denies suicidal/homicidal ideations.  Denies hopelessness/worthlessness.    Denies auditory/visual hallucinations      Tobacco: " never   Alcohol: 3-5 glasses wine weekly, social   Drug use: denies  Caffeine: 2 soft drinks daily  Review of Systems   PSYCHIATRIC: Pertinent items are noted in the narrative.        CONSTITUTIONAL: weight stable        M/S: no pain today         ENT: no allergies noted today        ABD: no n/v/d     Past Medical, Family and Social History: The patient's past medical, family and social history have been reviewed and updated as appropriate within the electronic medical record. See encounter notes.       Compliance: yes      Side effects: tolerates     Risk Parameters:  Patient reports no suicidal ideation  Patient reports no homicidal ideation  Patient reports no self-injurious behavior  Patient reports no violent behavior     Exam (detailed: at least 9 elements; comprehensive: all 15 elements)   Constitutional  Vitals:  Most recent vital signs, dated less than 90 days prior to this appointment, were reviewed.   General:  unremarkable, age appropriate, casual attire, good eye contact, good rapport       Musculoskeletal  Muscle Strength/Tone:  no flaccidity, no tremor    Gait & Station:  normal      Psychiatric                       Speech:  normal tone, normal rate, rhythm, and volume   Mood & Affect:   Euthymic, congruent, appropriate         Thought Process:   Goal directed; Linear    Associations:   intact   Thought Content:   No SI/HI, delusions, or paranoia, no AV/VH   Insight & Judgement:   Good, adequate to circumstances   Orientation:   grossly intact; alert and oriented x 4    Memory:  intact for content of interview    Language:  grossly intact, can repeat    Attention Span  : Grossly intact for content of interview   Fund of Knowledge:   intact and appropriate to age and level of education        Assessment and Diagnosis   Status/Progress: Based on the examination today, the patient's problem(s) is/are under fair control.  New problems have not been presented today. Comorbidities are not currently  complicating management of the primary condition.      Impression:    Pt is a 59 yo female  with past psychiatric hx of XIANG, mood disorder NOS who presents for follow up treatment. She is currently taking Zoloft 150 mg QD (increase from 75mg qd in 1/20),  Trazodone 50mg QHS (started during her visit 1/20, has only  Meds tolerated well and provide good relief of symptoms overall.    Pt was last seen by me around 8 months ago. Between visits, pt notes that she has been doing well. Her anxiety is minimal and she reports good stress tolerance. Denies excessive or ruminative worrying. Primary stressors include relationship stressors. Denies any decompensations of mood between visits and she is not overtly depressed today. Denies hopelessness or worthlessness. Sleep quality is good with routine use of Trazodone. While treated with trazodone, she finds it easier to fall and stay asleep. Pt is stable and high functioning.       Diagnosis: XIANG, mood disorder unspecified    Intervention/Counseling/Treatment Plan   Medication Management:      1) Cont Zoloft 150mg QD anxiety. Discussed potential for GI side effects, sexual dysfunction, mood destabilization, headaches  2) Cont Trazodone 50mg QHS for sleep.  5) Cont Vistaril 25 mg QHS for sleep      6. Call to report any worsening of symptoms or problems with the medication. Pt instructed to go to ER with thoughts of harming self, others     7. Patient given contact # for psychotherapists at [facility] and also instructed she may check with insurance for list of providers.      8. Labs: no new orders    Psychotherapy:   Target symptoms: anxiety, depression  Why chosen therapy is appropriate versus another modality: relevant to diagnosis, patient responds to this modality  Outcome monitoring methods: self-report, observation, feedback from family   Therapeutic intervention type: supportive psychotherapy  Topics discussed/themes: building skills sets for symptom management,  symptom recognition, nutrition, exercise  The patient's response to the intervention is accepting. The patient's progress toward treatment goals is positive progress.  Duration of intervention: 20 minutes     Return to clinic: 6 months self sched    -Spent 30min face to face with the pt; >50% time spent in counseling   -Supportive therapy and psychoeducation provided  -R/B/SE's of medications discussed with the pt who expresses understanding and chooses to take medications as prescribed.   -Pt instructed to call clinic, 911 or go to nearest emergency room if sxs worsen or pt is in   crisis. The pt expresses understanding.    Baljit Rubin, NP

## 2024-03-01 RX ORDER — TRAZODONE HYDROCHLORIDE 50 MG/1
50 TABLET ORAL NIGHTLY
Qty: 180 TABLET | Refills: 0 | Status: SHIPPED | OUTPATIENT
Start: 2024-03-01 | End: 2024-06-10 | Stop reason: SDUPTHER

## 2024-03-18 RX ORDER — SERTRALINE HYDROCHLORIDE 100 MG/1
TABLET, FILM COATED ORAL
Qty: 135 TABLET | Refills: 1 | Status: SHIPPED | OUTPATIENT
Start: 2024-03-18

## 2024-03-18 NOTE — TELEPHONE ENCOUNTER
Last ordered: 7 months ago (7/24/2023) by Baljit Rubin NP     Last refill: 12/18/2023       Nov none   Lov 2/15/24

## 2024-03-19 DIAGNOSIS — I10 ESSENTIAL (PRIMARY) HYPERTENSION: ICD-10-CM

## 2024-03-19 NOTE — TELEPHONE ENCOUNTER
Care Due:                  Date            Visit Type   Department     Provider  --------------------------------------------------------------------------------                                MYCHART                              ANNUAL                              CHECKUP/PHY  [facility]  Last Visit: 07-      S            CECILE Guo  Next Visit: None Scheduled  None         None Found                                                            Last  Test          Frequency    Reason                     Performed    Due Date  --------------------------------------------------------------------------------    CMP.........  12 months..  hydroCHLOROthiazide,       Not Found    Overdue                             lisinopriL...............    [employer] Embedded Care Due Messages. Reference number: [id number].   3/19/2024 12:34:51 AM CDT

## 2024-03-20 RX ORDER — HYDROCHLOROTHIAZIDE 25 MG/1
25 TABLET ORAL
Qty: 90 TABLET | Refills: 0 | Status: SHIPPED | OUTPATIENT
Start: 2024-03-20 | End: 2024-06-17

## 2024-03-20 NOTE — TELEPHONE ENCOUNTER
Refill Routing Note   Medication(s) are not appropriate for processing by [facility] for the following reason(s):        Required vitals abnormal  Required labs outdated    [facility] action(s):  Defer     Requires labs : Yes             Appointments  past 12m or future 3m with PCP    Date Provider   Last Visit   7/24/2023 Westley Guo, DO   Next Visit   Visit date not found Westley Guo, DO   ED visits in past 90 days: 0        Note composed:2:52 AM 03/20/2024

## 2024-05-15 DIAGNOSIS — I10 ESSENTIAL (PRIMARY) HYPERTENSION: ICD-10-CM

## 2024-05-15 RX ORDER — LISINOPRIL 20 MG/1
20 TABLET ORAL DAILY
Qty: 90 TABLET | Refills: 1 | Status: SHIPPED | OUTPATIENT
Start: 2024-05-15

## 2024-05-15 NOTE — TELEPHONE ENCOUNTER
No care due was identified.  [facility] Embedded Care Due Messages. Reference number: [id number].   5/15/2024 4:31:22 PM CDT

## 2024-06-11 RX ORDER — TRAZODONE HYDROCHLORIDE 50 MG/1
50 TABLET ORAL NIGHTLY
Qty: 180 TABLET | Refills: 0 | Status: SHIPPED | OUTPATIENT
Start: 2024-06-11

## 2024-06-16 DIAGNOSIS — I10 ESSENTIAL (PRIMARY) HYPERTENSION: ICD-10-CM

## 2024-06-16 NOTE — TELEPHONE ENCOUNTER
Care Due:                  Date            Visit Type   Department     Provider  --------------------------------------------------------------------------------                                MYCHART                              ANNUAL                              CHECKUP/PHY  [facility]  Last Visit: 07-      [facility]       Westley Guo  Next Visit: None Scheduled  None         None Found                                                            Last  Test          Frequency    Reason                     Performed    Due Date  --------------------------------------------------------------------------------    CMP.........  12 months..  hydroCHLOROthiazide,       Not Found    Overdue                             lisinopriL...............    [employer] Embedded Care Due Messages. Reference number: [id number].   6/16/2024 7:26:06 AM CDT

## 2024-06-17 RX ORDER — HYDROCHLOROTHIAZIDE 25 MG/1
25 TABLET ORAL
Qty: 90 TABLET | Refills: 0 | Status: SHIPPED | OUTPATIENT
Start: 2024-06-17

## 2024-06-17 NOTE — TELEPHONE ENCOUNTER
Refill Routing Note   Medication(s) are not appropriate for processing by [facility] for the following reason(s):        Required labs outdated  Required vitals abnormal    [facility] action(s):  Defer     Requires labs : Yes             Appointments  past 12m or future 3m with PCP    Date Provider   Last Visit   7/24/2023 Westley Guo, DO   Next Visit   Visit date not found Westley Guo, DO   ED visits in past 90 days: 0        Note composed:2:16 AM 06/17/2024

## 2024-08-14 DIAGNOSIS — Z12.31 OTHER SCREENING MAMMOGRAM: ICD-10-CM

## 2024-09-23 RX ORDER — SERTRALINE HYDROCHLORIDE 100 MG/1
TABLET, FILM COATED ORAL
Qty: 135 TABLET | Refills: 1 | Status: SHIPPED | OUTPATIENT
Start: 2024-09-23

## 2024-09-24 DIAGNOSIS — Z13.220 SCREENING FOR LIPID DISORDERS: Primary | ICD-10-CM

## 2024-09-24 DIAGNOSIS — I10 ESSENTIAL (PRIMARY) HYPERTENSION: ICD-10-CM

## 2024-09-24 DIAGNOSIS — Z13.29 SCREENING FOR THYROID DISORDER: ICD-10-CM

## 2024-09-24 DIAGNOSIS — Z00.00 ANNUAL PHYSICAL EXAM: ICD-10-CM

## 2024-09-24 DIAGNOSIS — Z13.1 SCREENING FOR DIABETES MELLITUS: ICD-10-CM

## 2024-09-24 DIAGNOSIS — Z12.31 SCREENING MAMMOGRAM FOR BREAST CANCER: ICD-10-CM

## 2024-09-24 RX ORDER — HYDROCHLOROTHIAZIDE 25 MG/1
25 TABLET ORAL
Qty: 30 TABLET | Refills: 0 | Status: SHIPPED | OUTPATIENT
Start: 2024-09-24

## 2024-09-24 NOTE — TELEPHONE ENCOUNTER
Care Due:                  Date            Visit Type   Department     Provider  --------------------------------------------------------------------------------                                MYCHART                              ANNUAL                              CHECKUP/PHY  [facility]  Last Visit: 07-      S            CECILE Guo  Next Visit: None Scheduled  None         None Found                                                            Last  Test          Frequency    Reason                     Performed    Due Date  --------------------------------------------------------------------------------    Office Visit  15 months..  hydroCHLOROthiazide,       07-   10-                             lisinopriL...............    CMP.........  12 months..  hydroCHLOROthiazide,       Not Found    Overdue                             lisinopriL...............    [employer] Embedded Care Due Messages. Reference number: [id number].   9/24/2024 12:39:40 AM CDT

## 2024-09-24 NOTE — TELEPHONE ENCOUNTER
Refill Routing Note   Medication(s) are not appropriate for processing by [facility] for the following reason(s):        Required labs outdated  Required vitals outdated    [facility] action(s):  Defer   Requires appointment : Yes     Requires labs : Yes             Appointments  past 12m or future 3m with PCP    Date Provider   Last Visit   Visit date not found Westley Guo, DO   Next Visit   Visit date not found Westley Guo, DO   ED visits in past 90 days: 0        Note composed:11:39 AM 09/24/2024

## 2024-09-25 NOTE — TELEPHONE ENCOUNTER
Please schedule labs and clinic appointment.   Orders Placed This Encounter   Procedures    Mammo Digital Screening Bilat w/ Nura     Standing Status:   Future     Standing Expiration Date:   9/24/2026     Order Specific Question:   May the Radiologist modify the order per protocol to meet the clinical needs of the patient?     Answer:   Yes     Order Specific Question:   Release to patient     Answer:   Immediate    CBC Auto Differential     Standing Status:   Future     Standing Expiration Date:   11/24/2025    Comprehensive Metabolic Panel     Standing Status:   Future     Standing Expiration Date:   11/24/2025    Hemoglobin A1C     Standing Status:   Future     Standing Expiration Date:   3/24/2026    Lipid Panel     Standing Status:   Future     Standing Expiration Date:   11/24/2025    TSH     Standing Status:   Future     Standing Expiration Date:   11/24/2025

## 2024-10-10 ENCOUNTER — HOSPITAL ENCOUNTER (OUTPATIENT)
Dept: RADIOLOGY | Facility: HOSPITAL | Age: 59
Discharge: HOME | End: 2024-10-10
Attending: INTERNAL MEDICINE
Payer: COMMERCIAL

## 2024-10-10 DIAGNOSIS — Z12.31 OTHER SCREENING MAMMOGRAM: ICD-10-CM

## 2024-10-10 PROCEDURE — 77067 SCR MAMMO BI INCL CAD: CPT | Mod: TC

## 2024-10-16 ENCOUNTER — TELEPHONE (OUTPATIENT)
Dept: ORTHOPEDICS | Facility: CLINIC | Age: 59
End: 2024-10-16
Payer: COMMERCIAL

## 2024-10-16 ENCOUNTER — OFFICE VISIT (OUTPATIENT)
Dept: PRIMARY CARE CLINIC | Facility: CLINIC | Age: 59
End: 2024-10-16
Payer: COMMERCIAL

## 2024-10-16 VITALS
BODY MASS INDEX: 34.18 KG/M2 | SYSTOLIC BLOOD PRESSURE: 133 MMHG | DIASTOLIC BLOOD PRESSURE: 84 MMHG | WEIGHT: 211.75 LBS | OXYGEN SATURATION: 97 % | HEART RATE: 81 BPM

## 2024-10-16 DIAGNOSIS — Z11.4 SCREENING FOR HIV (HUMAN IMMUNODEFICIENCY VIRUS): ICD-10-CM

## 2024-10-16 DIAGNOSIS — Z00.01 ABNORMAL PHYSICAL EVALUATION: Primary | ICD-10-CM

## 2024-10-16 DIAGNOSIS — I10 ESSENTIAL (PRIMARY) HYPERTENSION: ICD-10-CM

## 2024-10-16 DIAGNOSIS — F41.1 GAD (GENERALIZED ANXIETY DISORDER): ICD-10-CM

## 2024-10-16 DIAGNOSIS — E04.1 THYROID NODULE: ICD-10-CM

## 2024-10-16 DIAGNOSIS — L82.1 SEBORRHEIC KERATOSES: ICD-10-CM

## 2024-10-16 PROBLEM — H61.22 HEARING LOSS OF LEFT EAR DUE TO CERUMEN IMPACTION: Status: RESOLVED | Noted: 2019-04-18 | Resolved: 2024-10-16

## 2024-10-16 PROCEDURE — 99999 PR PBB SHADOW E&M-EST. PATIENT-LVL IV: CPT | Mod: PBBFAC,,,

## 2024-10-16 PROCEDURE — 99214 OFFICE O/P EST MOD 30 MIN: CPT | Mod: S$GLB,,,

## 2024-10-16 NOTE — PROGRESS NOTES
INTERNAL MEDICINE  [facility]    Reason for visit:   Chief Complaint   Patient presents with    Annual Exam     HPI: Eugenie Najera is a 58 y.o. female presenting today for annual examination and health maintenance.    Patient is an established patient of PCP, Dr. Westley Guo. This patient is new to me.    History of Present Illness    CHIEF COMPLAINT:  Eugenie presents today for an annual visit and to establish care with a new PCP.    FAMILY HISTORY:  Her father recently passed away. He had multiple health issues including diabetes, which he disclosed to her 1-2 years before his passing and was treating. She has been coping with his passing. She was his primary caregiver.    CARDIOVASCULAR:  She takes hydrochlorothiazide 25 mg and lisinopril 20 mg daily for hypertension management, which she reports are working well. Blood pressure is at goal today. She denies any cardiovascular symptoms. She inquires about the necessity of taking both blood pressure medications.    THYROID:  She reports having a thyroid nodule. An ultrasound and biopsy performed approximately four years ago showed benign results. She expresses concern about potential growth of the nodule and acknowledges not having followed up since her last evaluation. She is interested in determining the recommended follow-up for her thyroid condition.    PSYCHIATRIC:  She is currently taking Zoloft and Trazodone for psychiatric management, with no reported issues. She expresses a need for potentially increasing the Trazodone dosage. These medications are prescribed by psych who she meets with regularly.    GYNECOLOGICAL HISTORY:  She reports a history of total hysterectomy and has not seen a gynecologist since the procedure. She is prescribed Estradiol by a [facility] woman's clinic and expresses interest in establishing with GYN to manage her HRT.    WEIGHT MANAGEMENT:  She previously tried Ozempic for weight management but  "discontinued due to nausea. She expresses ambivalence about restarting, acknowledging its potential benefits for weight loss but hesitant due to prior adverse effects.    PREVENTIVE CARE:  She had a mammogram last week and states that the results have not yet been updated in her medical record.    SKIN CONCERNS:  She reports feeling that her skin is becoming "lumpier," which she attributes to the aging process. She notes that this change has occurred suddenly as she approaches 59 years of age. She appears to have a new seborrheic keratoses on her left shoulder. She is interested in completing a full skin check with dermatology.    EAR ISSUES:  She reports experiencing itching in her ears without any other ear-related symptoms or concerns. She applies oil topically which resolves the itching.    GASTROINTESTINAL:  She reports occasional constipation, which she manages by taking magnesium for relief. She denies experiencing abdominal pain or diarrhea.      ROS:  General: -fever, -chills, -fatigue, +weight gain, -weight loss  Eyes: -vision changes, -redness, -discharge  ENT: -ear pain, -nasal congestion, -sore throat  Cardiovascular: -chest pain, -palpitations, -lower extremity edema  Respiratory: -cough, -shortness of breath  Gastrointestinal: -abdominal pain, -nausea, -vomiting, -diarrhea, +constipation, -blood in stool  Genitourinary: -dysuria, -hematuria, -frequency  Musculoskeletal: -joint pain, -muscle pain  Skin: -rash, +lesion, +itching (ears, intermittent)  Neurological: -headache, -dizziness, -numbness, -tingling  Psychiatric: +anxiety, -depression, -sleep difficulty         Social History     Social History Narrative    Not on file       ALLERGIES:   Review of patient's allergies indicates:   Allergen Reactions    Penicillin v potassium Swelling       MEDS:   Current Outpatient Medications on File Prior to Visit   Medication Sig Dispense Refill Last Dose/Taking    acetaminophen (TYLENOL) 500 MG tablet Take " 1,000 mg by mouth daily as needed. As directed on package   Taking As Needed    esomeprazole (NEXIUM) 40 MG capsule    Taking    estradiol 0.05 mg/24 hr td ptwk 0.05 mg/24 hr PTWK Place 0.05 mg onto the skin.   Taking    hydroCHLOROthiazide (HYDRODIURIL) 25 MG tablet TAKE 1 TABLET BY MOUTH EVERY DAY 30 tablet 0 Taking    lisinopriL (PRINIVIL,ZESTRIL) 20 MG tablet Take 1 tablet (20 mg total) by mouth once daily. 90 tablet 1 Taking    sertraline (ZOLOFT) 100 MG tablet TAKE 1 AND 1/2 TABLETS BY MOUTH ONCE DAILY 135 tablet 1 Taking    traZODone (DESYREL) 50 MG tablet Take 1 tablet (50 mg total) by mouth every evening. 180 tablet 0 Taking       Vital signs:   Vitals:    10/16/24 0812   BP: 133/84   Pulse: 81   SpO2: 97%   Weight: 96.1 kg (211 lb 12 oz)     Body mass index is 34.18 kg/m².    PHYSICAL EXAM:     Physical Exam    Vitals: Blood pressure: 133/84.  General: No acute distress. Well-developed. Well-nourished.  Eyes: EOMI. Sclerae anicteric.  HENT: Normocephalic. Atraumatic.   Cardiovascular: Regular rate. Regular rhythm. No murmurs. No rubs. No gallops. Normal S1, S2.  Respiratory: Normal respiratory effort. Clear to auscultation bilaterally. No rales. No rhonchi. No wheezing.  Abdomen: Non-distended.  Musculoskeletal: No obvious deformity.  Extremities: No lower extremity edema.  Neurological: Alert & oriented x3. No slurred speech. Normal gait.  Psychiatric: Normal mood. Normal affect. Good insight. Good judgment.  Skin: Warm. Dry. No rash.  Neck: Thyroid nodule palpated on left.         PERTINENT RESULTS:   No visits with results within 1 Week(s) from this visit.   Latest known visit with results is:   Lab Visit on 12/20/2022   Component Date Value Ref Range Status    Cortisol, 8 AM 12/20/2022 17.50  4.30 - 22.40 ug/dL Final    Dexamethasone 12/20/2022 <20  ng/dL Final    Comment: Reference Ranges for Dexamethasone:    Baseline: Less than 20 ng/dL  1 mg dexamethasone overnight: 180-550 ng/dL (8:00-10:00  AM)    This test was developed and its analytical performance characteristics  have been determined by [email or web address]. It has not been cleared or approved by FDA. This assay has  been validated pursuant to the CLIA regulations and is used for  clinical purposes.  Test Performed at:  [email or web address]  [address] [place]  [address]     ASHLIE Martinez MD, PhD, CARLOS         ASSESSMENT/PLAN:    Assessment & Plan    Assessed thyroid nodule, last evaluated 2021 with ultrasound and 2020 with biopsy with benign findings. Positive family history of thyroid cancer (father). TSH/T4 ordered and referred to endocrine to determine necessary frequency of follow-up.  Reviewed blood pressure management, currently well-controlled on hydrochlorothiazide and lisinopril.  Evaluated hormone replacement therapy needs post-total hysterectomy.  Noted recent mammogram, awaiting results.  Reviewed past cortisol level, which was normal.  Considered family history of diabetes, noting importance of A1C monitoring.    HYPERTENSION:  - Continued hydrochlorothiazide and lisinopril for blood pressure management.    DEPRESSION:  - Continued Zoloft and trazodone.    HORMONE REPLACEMENT THERAPY:  - Continued Estradiol for hormone replacement therapy.    LABS:  - Ordered fasting labs including lipid panel and A1C.  - Ordered thyroid function tests.    THYROID NODULE:  - Referred to endocrinology for evaluation and management of thyroid nodule.    GYNECOLOGY REFERRAL:  - Referred to gynecology for hormone replacement therapy management and well-woman visit.         1. Abnormal physical evaluation  -     Ambulatory referral/consult to Gynecology; Future; Expected date: 10/23/2024  -     Ambulatory referral/consult to Dermatology; Future; Expected date: 10/23/2024    2. Screening for HIV (human immunodeficiency virus)  -     HIV 1/2 Ag/Ab (4th Gen); Future; Expected date:  10/16/2024    3. Thyroid nodule  -     Ambulatory referral/consult to Endocrinology; Future; Expected date: 10/23/2024  -     T4, FREE; Future; Expected date: 10/16/2024    4. Seborrheic keratoses    5. Essential (primary) hypertension  Assessment & Plan:  Well controlled on current medications      6. XIANG (generalized anxiety disorder)  Assessment & Plan:  Managed by psych  Good effect on zoloft and trazodone      Health maintenance addressed: Mammogram completed, diabetes and HIV screening scheduled  Vaccines recommended: Influenza, Tetanus, Shingles, Covid-19    Follow up in about 1 year (around 10/16/2025) for annual health examination pending lab results.     DHRUV VeeC   Internal Medicine

## 2024-10-21 ENCOUNTER — TELEPHONE (OUTPATIENT)
Dept: INTERNAL MEDICINE | Facility: CLINIC | Age: 59
End: 2024-10-21
Payer: COMMERCIAL

## 2024-10-22 ENCOUNTER — OFFICE VISIT (OUTPATIENT)
Dept: DERMATOLOGY | Facility: CLINIC | Age: 59
End: 2024-10-22
Payer: COMMERCIAL

## 2024-10-22 DIAGNOSIS — L82.1 SEBORRHEIC KERATOSES: ICD-10-CM

## 2024-10-22 DIAGNOSIS — D23.9 DERMATOFIBROMA: ICD-10-CM

## 2024-10-22 DIAGNOSIS — B07.9 VERRUCA: ICD-10-CM

## 2024-10-22 DIAGNOSIS — Z12.83 SCREENING EXAM FOR SKIN CANCER: ICD-10-CM

## 2024-10-22 DIAGNOSIS — D48.5 NEOPLASM OF UNCERTAIN BEHAVIOR OF SKIN: Primary | ICD-10-CM

## 2024-10-22 DIAGNOSIS — D22.9 MULTIPLE BENIGN NEVI: ICD-10-CM

## 2024-10-22 PROCEDURE — 99203 OFFICE O/P NEW LOW 30 MIN: CPT | Mod: 25,S$GLB,, | Performed by: STUDENT IN AN ORGANIZED HEALTH CARE EDUCATION/TRAINING PROGRAM

## 2024-10-22 PROCEDURE — 99999 PR PBB SHADOW E&M-EST. PATIENT-LVL III: CPT | Mod: PBBFAC,,, | Performed by: STUDENT IN AN ORGANIZED HEALTH CARE EDUCATION/TRAINING PROGRAM

## 2024-10-22 PROCEDURE — 11102 TANGNTL BX SKIN SINGLE LES: CPT | Mod: XS,S$GLB,, | Performed by: STUDENT IN AN ORGANIZED HEALTH CARE EDUCATION/TRAINING PROGRAM

## 2024-10-22 PROCEDURE — 17110 DESTRUCTION B9 LES UP TO 14: CPT | Mod: S$GLB,,, | Performed by: STUDENT IN AN ORGANIZED HEALTH CARE EDUCATION/TRAINING PROGRAM

## 2024-10-22 NOTE — PROGRESS NOTES
Subjective:      Patient ID:  Eugenie Najera is a 58 y.o. female who presents for   Chief Complaint   Patient presents with    Skin Cancer     FBSE     Eugenie Najera is a 58 y.o. female who presents for: FBSE screening exam for skin cancer.    New patient    The patient has the following lesions of concern:  Location: L arm  Duration: months  Symptoms: raised, newer  Relieving factors/Previous treatments: none    Pertinent history:  History of blistering sunburns: Yes  History of tanning bed use: Yes - in her 20's  Family history of melanoma: No  Personal history of mole removal: Yes - cosmetic removal  Personal history of skin cancer: No         Review of Systems   Skin:  Positive for daily sunscreen use (Face daily) and activity-related sunscreen use. Negative for recent sunburn.   Hematologic/Lymphatic: Bruises/bleeds easily (Pt burises easily).       Objective:   Physical Exam   Constitutional: She appears well-developed and well-nourished. No distress.   Neurological: She is alert and oriented to person, place, and time. She is not disoriented.   Psychiatric: She has a normal mood and affect.   Skin:   Areas Examined (abnormalities noted in diagram):   Scalp / Hair Palpated and Inspected  Head / Face Inspection Performed  Neck Inspection Performed  Chest / Axilla Inspection Performed  Abdomen Inspection Performed  Genitals / Buttocks / Groin Inspection Performed  Back Inspection Performed  RUE Inspected  LUE Inspection Performed  RLE Inspected  LLE Inspection Performed  Nails and Digits Inspection Performed                 Diagram Legend     Erythematous scaling macule/papule c/w actinic keratosis       Vascular papule c/w angioma      Pigmented verrucoid papule/plaque c/w seborrheic keratosis      Yellow umbilicated papule c/w sebaceous hyperplasia      Irregularly shaped tan macule c/w lentigo     1-2 mm smooth white papules consistent with Milia      Movable subcutaneous cyst with punctum c/w epidermal  inclusion cyst      Subcutaneous movable cyst c/w pilar cyst      Firm pink to brown papule c/w dermatofibroma      Pedunculated fleshy papule(s) c/w skin tag(s)      Evenly pigmented macule c/w junctional nevus     Mildly variegated pigmented, slightly irregular-bordered macule c/w mildly atypical nevus      Flesh colored to evenly pigmented papule c/w intradermal nevus       Pink pearly papule/plaque c/w basal cell carcinoma      Erythematous hyperkeratotic cursted plaque c/w SCC      Surgical scar with no sign of skin cancer recurrence      Open and closed comedones      Inflammatory papules and pustules      Verrucoid papule consistent consistent with wart     Erythematous eczematous patches and plaques     Dystrophic onycholytic nail with subungual debris c/w onychomycosis     Umbilicated papule    Erythematous-base heme-crusted tan verrucoid plaque consistent with inflamed seborrheic keratosis     Erythematous Silvery Scaling Plaque c/w Psoriasis     See annotation              Assessment / Plan:      Pathology Orders:       Normal Orders This Visit    Specimen to Pathology, Dermatology     Questions:    Procedure Type: Dermatology and skin neoplasms    Number of Specimens: 1    ------------------------: -------------------------    Spec 1 Procedure: Biopsy    Spec 1 Clinical Impression: pink-brown firm/atrophic macule asymmetric - ddx DF v amelanotic melanoma v dysplastic nevus v other    Spec 1 Source: R anterior shoulder    Release to patient: Immediate    Release to patient:           Neoplasm of uncertain behavior of skin  -     Specimen to Pathology, Dermatology  Shave biopsy procedure note:    Shave biopsy performed after verbal consent including risk of infection, scar, recurrence, need for additional treatment of site. Area prepped with alcohol, anesthetized with approximately 1.0cc of 1% lidocaine with epinephrine. Lesional tissue shaved with razor blade. Hemostasis achieved with application of  aluminum chloride followed by hyfrecation. No complications. Dressing applied. Wound care explained.    Verruca  Cryosurgery procedure note:    Verbal consent from the patient is obtained including, but not limited to, risk of hypopigmentation/hyperpigmentation, scar, recurrence of lesion. Liquid nitrogen cryosurgery is applied to 1 lesions to produce a freeze injury. The patient is aware that blisters may form and is instructed on wound care with gentle cleansing and use of vaseline ointment to keep moist until healed. The patient is supplied a handout on cryosurgery and is instructed to call if lesions do not completely resolve.    Seborrheic keratoses  These are benign inherited growths without a malignant potential. Reassurance given to patient. No treatment is necessary.     Dermatofibroma  This is a benign scar-like lesion secondary to minor trauma. No treatment required.     Multiple benign nevi  Reassurance    Screening exam for skin cancer  Total body skin examination performed today including at least 12 points as noted in physical examination. No lesions suspicious for malignancy noted.    Recommend daily sun protection/avoidance, use of at least SPF 30, broad spectrum sunscreen (OTC drug), skin self examinations, and routine physician surveillance to optimize early detection    RTC 1 year, sooner pending bx results

## 2024-10-30 ENCOUNTER — TELEPHONE (OUTPATIENT)
Dept: ORTHOPEDICS | Facility: CLINIC | Age: 59
End: 2024-10-30
Payer: COMMERCIAL

## 2024-11-04 ENCOUNTER — TELEPHONE (OUTPATIENT)
Dept: ORTHOPEDICS | Facility: CLINIC | Age: 59
End: 2024-11-04
Payer: COMMERCIAL

## 2024-12-13 DIAGNOSIS — I10 ESSENTIAL (PRIMARY) HYPERTENSION: ICD-10-CM

## 2024-12-13 RX ORDER — LISINOPRIL 20 MG/1
20 TABLET ORAL DAILY
Qty: 30 TABLET | Refills: 0 | Status: SHIPPED | OUTPATIENT
Start: 2024-12-13

## 2024-12-13 NOTE — TELEPHONE ENCOUNTER
Care Due:                  Date            Visit Type   Department     Provider  --------------------------------------------------------------------------------                                MYCHART                              ANNUAL                              CHECKUP/PHY  [facility] FAMILY  Last Visit: 07-      S            CECILE Guo  Next Visit: None Scheduled  None         None Found                                                            Last  Test          Frequency    Reason                     Performed    Due Date  --------------------------------------------------------------------------------    Office Visit  15 months..  hydroCHLOROthiazide,       07-   10-                             lisinopriL...............    CMP.........  12 months..  hydroCHLOROthiazide,       Not Found    Overdue                             lisinopriL...............    [employer] Embedded Care Due Messages. Reference number: [id number].   12/13/2024 12:45:19 AM CST

## 2025-01-15 DIAGNOSIS — I10 ESSENTIAL (PRIMARY) HYPERTENSION: ICD-10-CM

## 2025-01-15 RX ORDER — LISINOPRIL 20 MG/1
20 TABLET ORAL DAILY
Qty: 30 TABLET | Refills: 0 | Status: SHIPPED | OUTPATIENT
Start: 2025-01-15 | End: 2025-01-31

## 2025-01-15 NOTE — TELEPHONE ENCOUNTER
No care due was identified.  [facility] Embedded Care Due Messages. Reference number: [id number].   1/15/2025 11:32:35 AM CST

## 2025-01-30 DIAGNOSIS — I10 ESSENTIAL (PRIMARY) HYPERTENSION: ICD-10-CM

## 2025-01-31 RX ORDER — LISINOPRIL 20 MG/1
20 TABLET ORAL DAILY
Qty: 30 TABLET | Refills: 1 | Status: SHIPPED | OUTPATIENT
Start: 2025-01-31

## 2025-01-31 NOTE — TELEPHONE ENCOUNTER
No care due was identified.  Health [facility] Embedded Care Due Messages. Reference number: [id number].   1/30/2025 7:13:44 PM CST

## 2025-03-07 DIAGNOSIS — I10 ESSENTIAL (PRIMARY) HYPERTENSION: ICD-10-CM

## 2025-03-07 RX ORDER — HYDROCHLOROTHIAZIDE 25 MG/1
25 TABLET ORAL DAILY
Qty: 30 TABLET | Refills: 0 | Status: SHIPPED | OUTPATIENT
Start: 2025-03-07

## 2025-03-07 NOTE — TELEPHONE ENCOUNTER
Care Due:                  Date            Visit Type   Department     Provider  --------------------------------------------------------------------------------                                MYCHART                              ANNUAL                              CHECKUP/PHY  [facility] FAMILY  Last Visit: 07-      S            CECILE Guo  Next Visit: None Scheduled  None         None Found                                                            Last  Test          Frequency    Reason                     Performed    Due Date  --------------------------------------------------------------------------------    Office Visit  15 months..  hydroCHLOROthiazide,       07-   10-                             lisinopriL...............    CMP.........  12 months..  hydroCHLOROthiazide,       Not Found    Overdue                             lisinopriL...............    [employer] Embedded Care Due Messages. Reference number: [id number].   3/07/2025 2:33:46 PM CST

## 2025-03-08 NOTE — TELEPHONE ENCOUNTER
Sent pt a msg via portal re: needs a lab appt and an office visit.  Refill refilled but for only 30 days

## 2025-03-18 ENCOUNTER — PATIENT MESSAGE (OUTPATIENT)
Dept: FAMILY MEDICINE | Facility: CLINIC | Age: 60
End: 2025-03-18
Payer: COMMERCIAL

## 2025-03-19 RX ORDER — SERTRALINE HYDROCHLORIDE 100 MG/1
150 TABLET, FILM COATED ORAL
Qty: 135 TABLET | Refills: 1 | Status: SHIPPED | OUTPATIENT
Start: 2025-03-19

## 2025-03-21 ENCOUNTER — RESULTS FOLLOW-UP (OUTPATIENT)
Dept: FAMILY MEDICINE | Facility: CLINIC | Age: 60
End: 2025-03-21

## 2025-03-21 ENCOUNTER — LAB VISIT (OUTPATIENT)
Dept: LAB | Facility: HOSPITAL | Age: 60
End: 2025-03-21
Attending: INTERNAL MEDICINE
Payer: COMMERCIAL

## 2025-03-21 DIAGNOSIS — I10 ESSENTIAL (PRIMARY) HYPERTENSION: ICD-10-CM

## 2025-03-21 DIAGNOSIS — Z13.220 SCREENING FOR LIPID DISORDERS: ICD-10-CM

## 2025-03-21 DIAGNOSIS — Z13.29 SCREENING FOR THYROID DISORDER: ICD-10-CM

## 2025-03-21 DIAGNOSIS — Z13.1 SCREENING FOR DIABETES MELLITUS: ICD-10-CM

## 2025-03-21 DIAGNOSIS — Z00.00 ANNUAL PHYSICAL EXAM: ICD-10-CM

## 2025-03-21 LAB
ALBUMIN SERPL BCP-MCNC: 3.9 G/DL (ref 3.5–5.2)
ALP SERPL-CCNC: 58 U/L (ref 40–150)
ALT SERPL W/O P-5'-P-CCNC: 15 U/L (ref 10–44)
ANION GAP SERPL CALC-SCNC: 7 MMOL/L (ref 8–16)
AST SERPL-CCNC: 17 U/L (ref 10–40)
BASOPHILS # BLD AUTO: 0.04 K/UL (ref 0–0.2)
BASOPHILS NFR BLD: 0.6 % (ref 0–1.9)
BILIRUB SERPL-MCNC: 0.6 MG/DL (ref 0.1–1)
BUN SERPL-MCNC: 10 MG/DL (ref 6–20)
CALCIUM SERPL-MCNC: 9.1 MG/DL (ref 8.7–10.5)
CHLORIDE SERPL-SCNC: 101 MMOL/L (ref 95–110)
CHOLEST SERPL-MCNC: 249 MG/DL (ref 120–199)
CHOLEST/HDLC SERPL: 4.4 {RATIO} (ref 2–5)
CO2 SERPL-SCNC: 29 MMOL/L (ref 23–29)
CREAT SERPL-MCNC: 0.7 MG/DL (ref 0.5–1.4)
DIFFERENTIAL METHOD BLD: NORMAL
EOSINOPHIL # BLD AUTO: 0.1 K/UL (ref 0–0.5)
EOSINOPHIL NFR BLD: 1.2 % (ref 0–8)
ERYTHROCYTE [DISTWIDTH] IN BLOOD BY AUTOMATED COUNT: 14 % (ref 11.5–14.5)
EST. GFR  (NO RACE VARIABLE): >60 ML/MIN/1.73 M^2
ESTIMATED AVG GLUCOSE: 100 MG/DL (ref 68–131)
GLUCOSE SERPL-MCNC: 97 MG/DL (ref 70–110)
HBA1C MFR BLD: 5.1 % (ref 4–5.6)
HCT VFR BLD AUTO: 41.7 % (ref 37–48.5)
HDLC SERPL-MCNC: 56 MG/DL (ref 40–75)
HDLC SERPL: 22.5 % (ref 20–50)
HGB BLD-MCNC: 14.2 G/DL (ref 12–16)
IMM GRANULOCYTES # BLD AUTO: 0.03 K/UL (ref 0–0.04)
IMM GRANULOCYTES NFR BLD AUTO: 0.5 % (ref 0–0.5)
LDLC SERPL CALC-MCNC: 159.4 MG/DL (ref 63–159)
LYMPHOCYTES # BLD AUTO: 1.9 K/UL (ref 1–4.8)
LYMPHOCYTES NFR BLD: 29 % (ref 18–48)
MCH RBC QN AUTO: 29.8 PG (ref 27–31)
MCHC RBC AUTO-ENTMCNC: 34.1 G/DL (ref 32–36)
MCV RBC AUTO: 88 FL (ref 82–98)
MONOCYTES # BLD AUTO: 0.5 K/UL (ref 0.3–1)
MONOCYTES NFR BLD: 7.2 % (ref 4–15)
NEUTROPHILS # BLD AUTO: 4.1 K/UL (ref 1.8–7.7)
NEUTROPHILS NFR BLD: 61.5 % (ref 38–73)
NONHDLC SERPL-MCNC: 193 MG/DL
NRBC BLD-RTO: 0 /100 WBC
PLATELET # BLD AUTO: 178 K/UL (ref 150–450)
PMV BLD AUTO: 9.7 FL (ref 9.2–12.9)
POTASSIUM SERPL-SCNC: 4.5 MMOL/L (ref 3.5–5.1)
PROT SERPL-MCNC: 7.5 G/DL (ref 6–8.4)
RBC # BLD AUTO: 4.76 M/UL (ref 4–5.4)
SODIUM SERPL-SCNC: 137 MMOL/L (ref 136–145)
TRIGL SERPL-MCNC: 168 MG/DL (ref 30–150)
TSH SERPL DL<=0.005 MIU/L-ACNC: 3.23 UIU/ML (ref 0.4–4)
WBC # BLD AUTO: 6.63 K/UL (ref 3.9–12.7)

## 2025-03-21 PROCEDURE — 85025 COMPLETE CBC W/AUTO DIFF WBC: CPT | Performed by: INTERNAL MEDICINE

## 2025-03-21 PROCEDURE — 84443 ASSAY THYROID STIM HORMONE: CPT | Performed by: INTERNAL MEDICINE

## 2025-03-21 PROCEDURE — 80061 LIPID PANEL: CPT | Performed by: INTERNAL MEDICINE

## 2025-03-21 PROCEDURE — 36415 COLL VENOUS BLD VENIPUNCTURE: CPT | Performed by: INTERNAL MEDICINE

## 2025-03-21 PROCEDURE — 83036 HEMOGLOBIN GLYCOSYLATED A1C: CPT | Performed by: INTERNAL MEDICINE

## 2025-03-21 PROCEDURE — 80053 COMPREHEN METABOLIC PANEL: CPT | Performed by: INTERNAL MEDICINE

## 2025-03-21 RX ORDER — LISINOPRIL 20 MG/1
20 TABLET ORAL DAILY
Qty: 30 TABLET | Refills: 1 | Status: SHIPPED | OUTPATIENT
Start: 2025-03-21

## 2025-03-22 DIAGNOSIS — I10 ESSENTIAL (PRIMARY) HYPERTENSION: ICD-10-CM

## 2025-03-22 RX ORDER — LISINOPRIL 20 MG/1
20 TABLET ORAL DAILY
Qty: 30 TABLET | Refills: 1 | OUTPATIENT
Start: 2025-03-22

## 2025-03-22 NOTE — TELEPHONE ENCOUNTER
No care due was identified.  [facility] Embedded Care Due Messages. Reference number: [id number].   3/22/2025 7:07:31 AM CDT

## 2025-03-25 ENCOUNTER — TELEPHONE (OUTPATIENT)
Dept: FAMILY MEDICINE | Facility: CLINIC | Age: 60
End: 2025-03-25
Payer: COMMERCIAL

## 2025-03-25 NOTE — TELEPHONE ENCOUNTER
----- Message from Westley Guo DO sent at 3/21/2025  8:47 PM CDT -----  Please schedule an appointment  ----- Message -----  From: [facility]  Sent: 3/21/2025   7:55 AM CDT  To: Westley Guo DO

## 2025-04-15 DIAGNOSIS — I10 ESSENTIAL (PRIMARY) HYPERTENSION: ICD-10-CM

## 2025-04-15 NOTE — TELEPHONE ENCOUNTER
Refill request, message sent for patient to call to schedule an appointment , voicemail left as well.

## 2025-04-15 NOTE — TELEPHONE ENCOUNTER
No care due was identified.  [facility] Embedded Care Due Messages. Reference number: [id number].   4/15/2025 3:12:11 PM CDT

## 2025-04-19 RX ORDER — HYDROCHLOROTHIAZIDE 25 MG/1
25 TABLET ORAL DAILY
Qty: 90 TABLET | Refills: 0 | Status: SHIPPED | OUTPATIENT
Start: 2025-04-19

## 2025-05-06 ENCOUNTER — OFFICE VISIT (OUTPATIENT)
Dept: FAMILY MEDICINE | Facility: CLINIC | Age: 60
End: 2025-05-06
Payer: COMMERCIAL

## 2025-05-06 VITALS
HEIGHT: 66 IN | SYSTOLIC BLOOD PRESSURE: 132 MMHG | HEART RATE: 79 BPM | BODY MASS INDEX: 33.87 KG/M2 | WEIGHT: 210.75 LBS | OXYGEN SATURATION: 97 % | DIASTOLIC BLOOD PRESSURE: 86 MMHG

## 2025-05-06 DIAGNOSIS — I10 ESSENTIAL (PRIMARY) HYPERTENSION: ICD-10-CM

## 2025-05-06 DIAGNOSIS — Z00.00 PREVENTATIVE HEALTH CARE: ICD-10-CM

## 2025-05-06 DIAGNOSIS — F41.1 GAD (GENERALIZED ANXIETY DISORDER): Primary | ICD-10-CM

## 2025-05-06 DIAGNOSIS — Z12.11 SCREENING FOR COLON CANCER: ICD-10-CM

## 2025-05-06 DIAGNOSIS — E66.811 CLASS 1 OBESITY: ICD-10-CM

## 2025-05-06 DIAGNOSIS — E04.1 THYROID NODULE: ICD-10-CM

## 2025-05-06 PROBLEM — Z87.891 HISTORY OF TOBACCO USE: Status: RESOLVED | Noted: 2019-04-18 | Resolved: 2025-05-06

## 2025-05-06 PROCEDURE — 99396 PREV VISIT EST AGE 40-64: CPT | Mod: S$GLB,,, | Performed by: INTERNAL MEDICINE

## 2025-05-06 PROCEDURE — 99999 PR PBB SHADOW E&M-EST. PATIENT-LVL III: CPT | Mod: PBBFAC,,, | Performed by: INTERNAL MEDICINE

## 2025-05-06 RX ORDER — LISINOPRIL 20 MG/1
20 TABLET ORAL DAILY
Qty: 90 TABLET | Refills: 3 | Status: SHIPPED | OUTPATIENT
Start: 2025-05-06

## 2025-05-06 RX ORDER — HYDROCHLOROTHIAZIDE 25 MG/1
25 TABLET ORAL DAILY
Qty: 90 TABLET | Refills: 3 | Status: SHIPPED | OUTPATIENT
Start: 2025-05-06

## 2025-05-06 NOTE — PROGRESS NOTES
"   Patient ID: Eugenie Najera is a 59 y.o. female.    Chief Complaint: Follow-up       Assessment and plan   XIANG (generalized anxiety disorder)    Class 1 obesity    Essential (primary) hypertension  -     hydroCHLOROthiazide (HYDRODIURIL) 25 MG tablet; Take 1 tablet (25 mg total) by mouth once daily. You are due for labs and appointment.  Dispense: 90 tablet; Refill: 3  -     lisinopriL (PRINIVIL,ZESTRIL) 20 MG tablet; Take 1 tablet (20 mg total) by mouth once daily. Please schedule labs and appointment.  Dispense: 90 tablet; Refill: 3    Thyroid nodule  -     US Soft Tissue Head Neck; Future; Expected date: 05/06/2025    Screening for colon cancer  -     Cologuard Screening (Multitarget Stool DNA); Future; Expected date: 05/06/2025    Preventative health care       Assessment & Plan  XIANG (generalized anxiety disorder)  Continue sertraline and trazodone  Class 1 obesity  Discussed weight loss  Essential (primary) hypertension  Continue hydrochlorothiazide and lisinopril  Thyroid nodule  Repeat ultrasound  Screening for colon cancer  Cologuard ordered        History of present illness     Annual    DERM on [place]  Hypertension- taking medication, controlled.   Hyperlipidemia- , , Tchol 249.     The 10-year ASCVD risk score (Belkis DK, et al., 2019) is: 4.2%    Values used to calculate the score:      Age: 59 years      Sex: Female      Is Non- : No      Diabetic: No      Tobacco smoker: No      Systolic Blood Pressure: 132 mmHg      Is BP treated: Yes      HDL Cholesterol: 56 mg/dL      Total Cholesterol: 249 mg/dL    Numbness BL UE. Slight tremor. Works on laptop daily.   Father passed from cancer at 79 while on hospice.   Thyroid nodule- US 2021: "Redemonstrated dominant mixed cystic and solid left thyroid nodule, minimally increased in size compared to the prior study, otherwise without suspicious features (category TR-2)."    Review of Systems   Constitutional:  " Negative for fever.   Respiratory:  Negative for shortness of breath.    Cardiovascular:  Negative for chest pain.   Gastrointestinal:  Negative for abdominal pain.       I personally reviewed past medical, family and social history.     Objective    Vitals:    05/06/25 0929   BP: 132/86   Pulse: 79      Wt Readings from Last 3 Encounters:   05/06/25 0929 95.6 kg (210 lb 12.2 oz)   10/16/24 0812 96.1 kg (211 lb 12 oz)   02/15/24 0802 97.6 kg (215 lb 2.7 oz)      Body mass index is 34.02 kg/m².     Physical Exam  Cardiovascular:      Rate and Rhythm: Normal rate and regular rhythm.      Heart sounds: No murmur heard.     No gallop.   Pulmonary:      Breath sounds: Normal breath sounds. No wheezing or rhonchi.   Abdominal:      Palpations: Abdomen is soft.      Tenderness: There is no abdominal tenderness.        Reference     : Visit today included increased complexity associated with the care of the episodic problem XIANG (generalized anxiety disorder) [F41.1] addressed and managing the longitudinal care of the patient due to the serious and/or complex managed problem(s)    Active Problem List with Overview Notes    Diagnosis Date Noted    Class 1 obesity 08/13/2020    Unspecified mood (affective) disorder 10/22/2019    XIANG (generalized anxiety disorder) 05/30/2019    Lesion of larynx 03/21/2019    H. pylori infection 01/08/2019    Lesion of hypopharynx 01/08/2019    GERD (gastroesophageal reflux disease) 12/18/2018    Thyroid disorder     Anemia     Allergic rhinitis 03/02/2015    Essential (primary) hypertension 03/02/2015    Lipoma 04/27/2012    Avitaminosis D 04/27/2012    Absolute anemia 02/24/2011    Climacteric menorrhagia 02/24/2011    Adiposity 02/24/2011           Hypertension Medications              hydroCHLOROthiazide (HYDRODIURIL) 25 MG tablet Take 1 tablet (25 mg total) by mouth once daily. You are due for labs and appointment.    lisinopriL (PRINIVIL,ZESTRIL) 20 MG tablet Take 1 tablet (20 mg  total) by mouth once daily. Please schedule labs and appointment.              Medication List with Changes/Refills   Current Medications    ACETAMINOPHEN (TYLENOL) 500 MG TABLET    Take 1,000 mg by mouth daily as needed. As directed on package    ESOMEPRAZOLE (NEXIUM) 40 MG CAPSULE        ESTRADIOL 0.05 MG/24 HR TD PTWK 0.05 MG/24 HR PTWK    Place 0.05 mg onto the skin.    SERTRALINE (ZOLOFT) 100 MG TABLET    TAKE 1 AND 1/2 TABLETS BY MOUTH ONCE DAILY    TRAZODONE (DESYREL) 50 MG TABLET    Take 1 tablet (50 mg total) by mouth every evening.   Changed and/or Refilled Medications    Modified Medication Previous Medication    HYDROCHLOROTHIAZIDE (HYDRODIURIL) 25 MG TABLET hydroCHLOROthiazide (HYDRODIURIL) 25 MG tablet       Take 1 tablet (25 mg total) by mouth once daily. You are due for labs and appointment.    Take 1 tablet (25 mg total) by mouth once daily. You are due for labs and appointment.    LISINOPRIL (PRINIVIL,ZESTRIL) 20 MG TABLET lisinopriL (PRINIVIL,ZESTRIL) 20 MG tablet       Take 1 tablet (20 mg total) by mouth once daily. Please schedule labs and appointment.    Take 1 tablet (20 mg total) by mouth once daily. Please schedule labs and appointment.

## 2025-05-22 ENCOUNTER — PATIENT MESSAGE (OUTPATIENT)
Dept: FAMILY MEDICINE | Facility: CLINIC | Age: 60
End: 2025-05-22
Payer: COMMERCIAL

## 2025-05-22 DIAGNOSIS — I10 ESSENTIAL (PRIMARY) HYPERTENSION: ICD-10-CM

## 2025-05-22 RX ORDER — LISINOPRIL 20 MG/1
20 TABLET ORAL
Qty: 90 TABLET | Refills: 3 | Status: SHIPPED | OUTPATIENT
Start: 2025-05-22

## 2025-05-22 RX ORDER — HYDROCHLOROTHIAZIDE 25 MG/1
25 TABLET ORAL DAILY
Qty: 90 TABLET | Refills: 3 | Status: CANCELLED | OUTPATIENT
Start: 2025-05-22

## 2025-05-22 NOTE — TELEPHONE ENCOUNTER
Refill Decision Note   Eugenie Najera  is requesting a refill authorization.  Brief Assessment and Rationale for Refill:  Approve     Medication Therapy Plan:        Comments:     Note composed:12:57 PM 05/22/2025

## 2025-05-22 NOTE — TELEPHONE ENCOUNTER
No care due was identified.  Health [facility] Embedded Care Due Messages. Reference number: [id number].   5/22/2025 1:15:06 PM CDT

## 2025-05-22 NOTE — TELEPHONE ENCOUNTER
No care due was identified.  Health [facility] Embedded Care Due Messages. Reference number: [id number].   5/22/2025 12:53:03 PM CDT

## 2025-05-23 ENCOUNTER — PATIENT MESSAGE (OUTPATIENT)
Dept: FAMILY MEDICINE | Facility: CLINIC | Age: 60
End: 2025-05-23
Payer: COMMERCIAL

## 2025-05-23 DIAGNOSIS — I10 ESSENTIAL (PRIMARY) HYPERTENSION: ICD-10-CM

## 2025-05-23 RX ORDER — HYDROCHLOROTHIAZIDE 25 MG/1
25 TABLET ORAL DAILY
Qty: 90 TABLET | Refills: 3 | Status: SHIPPED | OUTPATIENT
Start: 2025-05-23

## 2025-05-23 NOTE — TELEPHONE ENCOUNTER
No care due was identified.  [facility] Embedded Care Due Messages. Reference number: [id number].   5/23/2025 4:24:29 PM CDT

## 2025-06-19 RX ORDER — TRAZODONE HYDROCHLORIDE 50 MG/1
50 TABLET ORAL NIGHTLY
Qty: 90 TABLET | Refills: 1 | Status: SHIPPED | OUTPATIENT
Start: 2025-06-19

## 2025-06-30 ENCOUNTER — RESULTS FOLLOW-UP (OUTPATIENT)
Dept: FAMILY MEDICINE | Facility: CLINIC | Age: 60
End: 2025-06-30

## 2025-07-01 ENCOUNTER — TELEPHONE (OUTPATIENT)
Dept: GASTROENTEROLOGY | Facility: CLINIC | Age: 60
End: 2025-07-01
Payer: COMMERCIAL

## 2025-07-01 NOTE — TELEPHONE ENCOUNTER
Attempted to reach pt to schedule scope from case request. Left VM, call back number provided. [employer] message sent.

## 2025-09-02 ENCOUNTER — OFFICE VISIT (OUTPATIENT)
Dept: INTERNAL MEDICINE | Facility: CLINIC | Age: 60
End: 2025-09-02
Payer: COMMERCIAL

## 2025-09-02 VITALS
DIASTOLIC BLOOD PRESSURE: 72 MMHG | OXYGEN SATURATION: 97 % | WEIGHT: 213.63 LBS | HEIGHT: 66 IN | BODY MASS INDEX: 34.33 KG/M2 | HEART RATE: 68 BPM | SYSTOLIC BLOOD PRESSURE: 141 MMHG

## 2025-09-02 DIAGNOSIS — E78.2 MIXED HYPERLIPIDEMIA: Primary | ICD-10-CM

## 2025-09-02 DIAGNOSIS — I10 ESSENTIAL (PRIMARY) HYPERTENSION: ICD-10-CM

## 2025-09-02 DIAGNOSIS — Z76.89 ESTABLISHING CARE WITH NEW DOCTOR, ENCOUNTER FOR: ICD-10-CM

## 2025-09-02 DIAGNOSIS — R55 VASOVAGAL SYNCOPE: ICD-10-CM

## 2025-09-02 DIAGNOSIS — R19.5 POSITIVE COLORECTAL CANCER SCREENING USING COLOGUARD TEST: ICD-10-CM

## 2025-09-02 PROCEDURE — 99999 PR PBB SHADOW E&M-EST. PATIENT-LVL III: CPT | Mod: PBBFAC,,, | Performed by: STUDENT IN AN ORGANIZED HEALTH CARE EDUCATION/TRAINING PROGRAM

## 2025-09-02 PROCEDURE — 99214 OFFICE O/P EST MOD 30 MIN: CPT | Mod: S$GLB,,, | Performed by: STUDENT IN AN ORGANIZED HEALTH CARE EDUCATION/TRAINING PROGRAM

## 2025-09-02 RX ORDER — ROSUVASTATIN CALCIUM 5 MG/1
5 TABLET, COATED ORAL DAILY
Qty: 90 TABLET | Refills: 3 | Status: SHIPPED | OUTPATIENT
Start: 2025-09-02 | End: 2025-09-02

## 2025-09-02 RX ORDER — ROSUVASTATIN CALCIUM 5 MG/1
5 TABLET, COATED ORAL DAILY
Qty: 90 TABLET | Refills: 3 | Status: SHIPPED | OUTPATIENT
Start: 2025-09-02 | End: 2026-09-02